# Patient Record
Sex: MALE | Race: WHITE | Employment: FULL TIME | URBAN - METROPOLITAN AREA
[De-identification: names, ages, dates, MRNs, and addresses within clinical notes are randomized per-mention and may not be internally consistent; named-entity substitution may affect disease eponyms.]

---

## 2021-05-18 ENCOUNTER — HOSPITAL ENCOUNTER (INPATIENT)
Age: 46
LOS: 16 days | Discharge: HOME | DRG: 121 | End: 2021-06-03
Attending: STUDENT IN AN ORGANIZED HEALTH CARE EDUCATION/TRAINING PROGRAM | Admitting: STUDENT IN AN ORGANIZED HEALTH CARE EDUCATION/TRAINING PROGRAM
Payer: COMMERCIAL

## 2021-05-18 ENCOUNTER — APPOINTMENT (OUTPATIENT)
Dept: CT IMAGING | Age: 46
DRG: 121 | End: 2021-05-18
Attending: STUDENT IN AN ORGANIZED HEALTH CARE EDUCATION/TRAINING PROGRAM
Payer: COMMERCIAL

## 2021-05-18 DIAGNOSIS — J85.1 ABSCESS OF UPPER LOBE OF LEFT LUNG WITH PNEUMONIA (HCC): Primary | ICD-10-CM

## 2021-05-18 PROBLEM — J85.2 LUNG ABSCESS (HCC): Status: ACTIVE | Noted: 2021-05-18

## 2021-05-18 LAB
ALBUMIN SERPL-MCNC: 1.4 G/DL (ref 3.5–5)
ALBUMIN/GLOB SERPL: 0.2 {RATIO} (ref 1.1–2.2)
ALP SERPL-CCNC: 128 U/L (ref 45–117)
ALT SERPL-CCNC: 36 U/L (ref 12–78)
AMPHET UR QL SCN: NEGATIVE
ANION GAP SERPL CALC-SCNC: 9 MMOL/L (ref 5–15)
AST SERPL-CCNC: 65 U/L (ref 15–37)
B PERT DNA SPEC QL NAA+PROBE: NOT DETECTED
BARBITURATES UR QL SCN: NEGATIVE
BASOPHILS # BLD: 0.1 K/UL (ref 0–0.1)
BASOPHILS NFR BLD: 0 % (ref 0–1)
BENZODIAZ UR QL: NEGATIVE
BILIRUB SERPL-MCNC: 0.6 MG/DL (ref 0.2–1)
BORDETELLA PARAPERTUSSIS PCR, BORPAR: NOT DETECTED
BUN SERPL-MCNC: 7 MG/DL (ref 6–20)
BUN/CREAT SERPL: 15 (ref 12–20)
C PNEUM DNA SPEC QL NAA+PROBE: NOT DETECTED
CALCIUM SERPL-MCNC: 7.9 MG/DL (ref 8.5–10.1)
CANNABINOIDS UR QL SCN: NEGATIVE
CHLORIDE SERPL-SCNC: 94 MMOL/L (ref 97–108)
CO2 SERPL-SCNC: 26 MMOL/L (ref 21–32)
COCAINE UR QL SCN: NEGATIVE
COMMENT, HOLDF: NORMAL
COVID-19 RAPID TEST, COVR: NOT DETECTED
CREAT SERPL-MCNC: 0.48 MG/DL (ref 0.7–1.3)
DIFFERENTIAL METHOD BLD: ABNORMAL
DRUG SCRN COMMENT,DRGCM: NORMAL
EOSINOPHIL # BLD: 0 K/UL (ref 0–0.4)
EOSINOPHIL NFR BLD: 0 % (ref 0–7)
ERYTHROCYTE [DISTWIDTH] IN BLOOD BY AUTOMATED COUNT: 14.4 % (ref 11.5–14.5)
FLUAV H1 2009 PAND RNA SPEC QL NAA+PROBE: NOT DETECTED
FLUAV H1 RNA SPEC QL NAA+PROBE: NOT DETECTED
FLUAV H3 RNA SPEC QL NAA+PROBE: NOT DETECTED
FLUAV SUBTYP SPEC NAA+PROBE: NOT DETECTED
FLUBV RNA SPEC QL NAA+PROBE: NOT DETECTED
GLOBULIN SER CALC-MCNC: 5.8 G/DL (ref 2–4)
GLUCOSE SERPL-MCNC: 280 MG/DL (ref 65–100)
HADV DNA SPEC QL NAA+PROBE: NOT DETECTED
HCOV 229E RNA SPEC QL NAA+PROBE: NOT DETECTED
HCOV HKU1 RNA SPEC QL NAA+PROBE: NOT DETECTED
HCOV NL63 RNA SPEC QL NAA+PROBE: NOT DETECTED
HCOV OC43 RNA SPEC QL NAA+PROBE: NOT DETECTED
HCT VFR BLD AUTO: 32.5 % (ref 36.6–50.3)
HGB BLD-MCNC: 10.2 G/DL (ref 12.1–17)
HIV 1+2 AB+HIV1 P24 AG SERPL QL IA: NONREACTIVE
HIV12 RESULT COMMENT, HHIVC: NORMAL
HMPV RNA SPEC QL NAA+PROBE: NOT DETECTED
HPIV1 RNA SPEC QL NAA+PROBE: NOT DETECTED
HPIV2 RNA SPEC QL NAA+PROBE: NOT DETECTED
HPIV3 RNA SPEC QL NAA+PROBE: NOT DETECTED
HPIV4 RNA SPEC QL NAA+PROBE: NOT DETECTED
IMM GRANULOCYTES # BLD AUTO: 0.2 K/UL (ref 0–0.04)
IMM GRANULOCYTES NFR BLD AUTO: 1 % (ref 0–0.5)
LACTATE SERPL-SCNC: 1.3 MMOL/L (ref 0.4–2)
LYMPHOCYTES # BLD: 1.4 K/UL (ref 0.8–3.5)
LYMPHOCYTES NFR BLD: 9 % (ref 12–49)
M PNEUMO DNA SPEC QL NAA+PROBE: NOT DETECTED
MCH RBC QN AUTO: 26.1 PG (ref 26–34)
MCHC RBC AUTO-ENTMCNC: 31.4 G/DL (ref 30–36.5)
MCV RBC AUTO: 83.1 FL (ref 80–99)
METHADONE UR QL: NEGATIVE
MONOCYTES # BLD: 1.6 K/UL (ref 0–1)
MONOCYTES NFR BLD: 10 % (ref 5–13)
NEUTS SEG # BLD: 12.7 K/UL (ref 1.8–8)
NEUTS SEG NFR BLD: 80 % (ref 32–75)
NRBC # BLD: 0 K/UL (ref 0–0.01)
NRBC BLD-RTO: 0 PER 100 WBC
OPIATES UR QL: NEGATIVE
OSMOLALITY UR: 562 MOSM/KG H2O
PCP UR QL: NEGATIVE
PLATELET # BLD AUTO: 579 K/UL (ref 150–400)
PMV BLD AUTO: 10.7 FL (ref 8.9–12.9)
POTASSIUM SERPL-SCNC: 3.5 MMOL/L (ref 3.5–5.1)
PROCALCITONIN SERPL-MCNC: 1.93 NG/ML
PROT SERPL-MCNC: 7.2 G/DL (ref 6.4–8.2)
RBC # BLD AUTO: 3.91 M/UL (ref 4.1–5.7)
RSV RNA SPEC QL NAA+PROBE: NOT DETECTED
RV+EV RNA SPEC QL NAA+PROBE: NOT DETECTED
SAMPLES BEING HELD,HOLD: NORMAL
SARS-COV-2 PCR, COVPCR: NOT DETECTED
SODIUM SERPL-SCNC: 129 MMOL/L (ref 136–145)
SODIUM UR-SCNC: 32 MMOL/L
SOURCE, COVRS: NORMAL
WBC # BLD AUTO: 15.9 K/UL (ref 4.1–11.1)

## 2021-05-18 PROCEDURE — 87635 SARS-COV-2 COVID-19 AMP PRB: CPT

## 2021-05-18 PROCEDURE — 87116 MYCOBACTERIA CULTURE: CPT

## 2021-05-18 PROCEDURE — P9047 ALBUMIN (HUMAN), 25%, 50ML: HCPCS | Performed by: HOSPITALIST

## 2021-05-18 PROCEDURE — 84300 ASSAY OF URINE SODIUM: CPT

## 2021-05-18 PROCEDURE — 86480 TB TEST CELL IMMUN MEASURE: CPT

## 2021-05-18 PROCEDURE — 86901 BLOOD TYPING SEROLOGIC RH(D): CPT

## 2021-05-18 PROCEDURE — 74011250637 HC RX REV CODE- 250/637: Performed by: HOSPITALIST

## 2021-05-18 PROCEDURE — 74011000258 HC RX REV CODE- 258: Performed by: STUDENT IN AN ORGANIZED HEALTH CARE EDUCATION/TRAINING PROGRAM

## 2021-05-18 PROCEDURE — 94760 N-INVAS EAR/PLS OXIMETRY 1: CPT

## 2021-05-18 PROCEDURE — 87449 NOS EACH ORGANISM AG IA: CPT

## 2021-05-18 PROCEDURE — 80307 DRUG TEST PRSMV CHEM ANLYZR: CPT

## 2021-05-18 PROCEDURE — 77010033678 HC OXYGEN DAILY

## 2021-05-18 PROCEDURE — 87147 CULTURE TYPE IMMUNOLOGIC: CPT

## 2021-05-18 PROCEDURE — 74011250637 HC RX REV CODE- 250/637: Performed by: STUDENT IN AN ORGANIZED HEALTH CARE EDUCATION/TRAINING PROGRAM

## 2021-05-18 PROCEDURE — 65270000029 HC RM PRIVATE

## 2021-05-18 PROCEDURE — 36415 COLL VENOUS BLD VENIPUNCTURE: CPT

## 2021-05-18 PROCEDURE — 0202U NFCT DS 22 TRGT SARS-COV-2: CPT

## 2021-05-18 PROCEDURE — 83605 ASSAY OF LACTIC ACID: CPT

## 2021-05-18 PROCEDURE — 87899 AGENT NOS ASSAY W/OPTIC: CPT

## 2021-05-18 PROCEDURE — 74011250636 HC RX REV CODE- 250/636: Performed by: HOSPITALIST

## 2021-05-18 PROCEDURE — 71260 CT THORAX DX C+: CPT

## 2021-05-18 PROCEDURE — 85025 COMPLETE CBC W/AUTO DIFF WBC: CPT

## 2021-05-18 PROCEDURE — 87040 BLOOD CULTURE FOR BACTERIA: CPT

## 2021-05-18 PROCEDURE — 83935 ASSAY OF URINE OSMOLALITY: CPT

## 2021-05-18 PROCEDURE — 87205 SMEAR GRAM STAIN: CPT

## 2021-05-18 PROCEDURE — 87389 HIV-1 AG W/HIV-1&-2 AB AG IA: CPT

## 2021-05-18 PROCEDURE — 84145 PROCALCITONIN (PCT): CPT

## 2021-05-18 PROCEDURE — 74011250636 HC RX REV CODE- 250/636: Performed by: NURSE PRACTITIONER

## 2021-05-18 PROCEDURE — 87086 URINE CULTURE/COLONY COUNT: CPT

## 2021-05-18 PROCEDURE — 74011000258 HC RX REV CODE- 258: Performed by: NURSE PRACTITIONER

## 2021-05-18 PROCEDURE — 74011000636 HC RX REV CODE- 636: Performed by: HOSPITALIST

## 2021-05-18 PROCEDURE — 74011250636 HC RX REV CODE- 250/636: Performed by: STUDENT IN AN ORGANIZED HEALTH CARE EDUCATION/TRAINING PROGRAM

## 2021-05-18 PROCEDURE — 65660000000 HC RM CCU STEPDOWN

## 2021-05-18 PROCEDURE — 80053 COMPREHEN METABOLIC PANEL: CPT

## 2021-05-18 RX ORDER — ATORVASTATIN CALCIUM 40 MG/1
80 TABLET, FILM COATED ORAL DAILY
Status: DISCONTINUED | OUTPATIENT
Start: 2021-05-18 | End: 2021-06-03 | Stop reason: HOSPADM

## 2021-05-18 RX ORDER — SODIUM CHLORIDE 9 MG/ML
100 INJECTION, SOLUTION INTRAVENOUS CONTINUOUS
Status: DISCONTINUED | OUTPATIENT
Start: 2021-05-18 | End: 2021-05-21

## 2021-05-18 RX ORDER — LANOLIN ALCOHOL/MO/W.PET/CERES
3 CREAM (GRAM) TOPICAL
Status: DISCONTINUED | OUTPATIENT
Start: 2021-05-18 | End: 2021-06-03 | Stop reason: HOSPADM

## 2021-05-18 RX ORDER — POLYETHYLENE GLYCOL 3350 17 G/17G
17 POWDER, FOR SOLUTION ORAL DAILY PRN
Status: DISCONTINUED | OUTPATIENT
Start: 2021-05-18 | End: 2021-06-03 | Stop reason: HOSPADM

## 2021-05-18 RX ORDER — METOPROLOL SUCCINATE 50 MG/1
50 TABLET, EXTENDED RELEASE ORAL DAILY
Status: DISCONTINUED | OUTPATIENT
Start: 2021-05-18 | End: 2021-05-23

## 2021-05-18 RX ORDER — SODIUM CHLORIDE 0.9 % (FLUSH) 0.9 %
5-40 SYRINGE (ML) INJECTION AS NEEDED
Status: DISCONTINUED | OUTPATIENT
Start: 2021-05-18 | End: 2021-05-21

## 2021-05-18 RX ORDER — PROMETHAZINE HYDROCHLORIDE 25 MG/1
12.5 TABLET ORAL
Status: DISCONTINUED | OUTPATIENT
Start: 2021-05-18 | End: 2021-06-03 | Stop reason: HOSPADM

## 2021-05-18 RX ORDER — ALBUMIN HUMAN 250 G/1000ML
12.5 SOLUTION INTRAVENOUS EVERY 6 HOURS
Status: COMPLETED | OUTPATIENT
Start: 2021-05-18 | End: 2021-05-20

## 2021-05-18 RX ORDER — SODIUM CHLORIDE 0.9 % (FLUSH) 0.9 %
5-40 SYRINGE (ML) INJECTION EVERY 8 HOURS
Status: DISCONTINUED | OUTPATIENT
Start: 2021-05-18 | End: 2021-05-21

## 2021-05-18 RX ORDER — IBUPROFEN 200 MG
1 TABLET ORAL DAILY
Status: DISCONTINUED | OUTPATIENT
Start: 2021-05-18 | End: 2021-06-03 | Stop reason: HOSPADM

## 2021-05-18 RX ORDER — ONDANSETRON 2 MG/ML
4 INJECTION INTRAMUSCULAR; INTRAVENOUS
Status: DISCONTINUED | OUTPATIENT
Start: 2021-05-18 | End: 2021-06-03 | Stop reason: HOSPADM

## 2021-05-18 RX ORDER — ACETAMINOPHEN 650 MG/1
650 SUPPOSITORY RECTAL
Status: DISCONTINUED | OUTPATIENT
Start: 2021-05-18 | End: 2021-05-21

## 2021-05-18 RX ORDER — ACETAMINOPHEN 325 MG/1
650 TABLET ORAL
Status: DISCONTINUED | OUTPATIENT
Start: 2021-05-18 | End: 2021-05-21

## 2021-05-18 RX ADMIN — ALBUMIN (HUMAN) 12.5 G: 0.25 INJECTION, SOLUTION INTRAVENOUS at 19:05

## 2021-05-18 RX ADMIN — ACETAMINOPHEN 650 MG: 325 TABLET ORAL at 21:30

## 2021-05-18 RX ADMIN — ALBUMIN (HUMAN) 12.5 G: 0.25 INJECTION, SOLUTION INTRAVENOUS at 12:45

## 2021-05-18 RX ADMIN — PIPERACILLIN AND TAZOBACTAM 3.38 G: 3; .375 INJECTION, POWDER, LYOPHILIZED, FOR SOLUTION INTRAVENOUS at 06:00

## 2021-05-18 RX ADMIN — SODIUM CHLORIDE 125 ML/HR: 900 INJECTION, SOLUTION INTRAVENOUS at 06:00

## 2021-05-18 RX ADMIN — Medication 10 ML: at 06:00

## 2021-05-18 RX ADMIN — PIPERACILLIN AND TAZOBACTAM 3.38 G: 3; .375 INJECTION, POWDER, LYOPHILIZED, FOR SOLUTION INTRAVENOUS at 15:06

## 2021-05-18 RX ADMIN — DOXYCYCLINE 100 MG: 100 INJECTION, POWDER, LYOPHILIZED, FOR SOLUTION INTRAVENOUS at 18:06

## 2021-05-18 RX ADMIN — Medication 3 MG: at 21:30

## 2021-05-18 RX ADMIN — ATORVASTATIN CALCIUM 80 MG: 40 TABLET, FILM COATED ORAL at 12:46

## 2021-05-18 RX ADMIN — PIPERACILLIN AND TAZOBACTAM 3.38 G: 3; .375 INJECTION, POWDER, LYOPHILIZED, FOR SOLUTION INTRAVENOUS at 21:22

## 2021-05-18 RX ADMIN — IOPAMIDOL 100 ML: 612 INJECTION, SOLUTION INTRAVENOUS at 16:00

## 2021-05-18 NOTE — PROGRESS NOTES
Covid rule out this AM. Continues to be droplet precautions. Now Patient is transferring off the unit. Going to 2N. Will follow up this PM as appropriate and as time allows. Thanks!

## 2021-05-18 NOTE — CONSULTS
NEPHROLOGY CONSULT NOTE     Patient: Marisol Caldwell MRN: [id number]  PCP: Rambo Mchugh MD   :     1975  Age:   39 y.o. Sex:  male      Referring physician: Mylene Posadas MD  Reason for consultation: 39 y.o. male with Lung abscess ([facility].) [S29.4] complicated by LEXIS   Admission Date: 2021  2:43 AM  LOS: 0 days      ASSESSMENT and PLAN :   Hyponatremia:  - suspect SIADH from his pulmonary process  - CT scan w/ contrast pending  - check urine osms, Na  - NPO for now  - hold on further IVF  - FR 1.5L/d once pt is eating  - daily Na levels    PNA:  - ? Cavitary PNA vs CAP  - rapid covid neg  - for CTA today  - abx per primary team    Hypotension:  - BP stable after albumin  - hold antihypertensives for now    DM2  CAD  Tobacco use     Active Problems / Assessment AAActive  : Active Problems:    Lung abscess ([facility].) (2021)         Subjective:   HPI: Marisol Caldwell is a 39 y.o.  male who has been admitted to the hospital for chills, night sweats and malaise. He has a hx of CAD s/p multiple stents, DM2, HTN, HLD. Presented to [facility] with the above complaints. He had CT of his chest which showed: Extensive pleural and parenchymal abnormalities notably in right lung with areas representing pulmonary consolidation as well as abscess formation and right empyema. He was tx to [facility] for further care. Rapid COVID neg. Labs revealed Na of 129, normal renal function. Still having productive cough. No cp, n/v/d    Past Medical Hx:   No past medical history on file. Past Surgical Hx:   No past surgical history on file. Medications:  Prior to Admission medications    Not on File       No Known Allergies    Social Hx:       No family history on file. Review of Systems:  A twelve point review of system was performed today. Pertinent positives and negatives are mentioned in the HPI. The reminder of the ROS is negative and noncontributory.      Objective:    Vitals: Vitals:    05/18/21 0308 05/18/21 0500 05/18/21 0823 05/18/21 1031   BP: 103/71 102/67 (!) 91/54 122/67   Pulse: 98 95 (!) 116 (!) 104   Resp: 20 20 18 18   Temp: 98.1 °F (36.7 °C) 98.1 °F (36.7 °C) 98.5 °F (36.9 °C) 98.5 °F (36.9 °C)   SpO2: 99% 95% 96% 94%   Weight: 81.4 kg (179 lb 6.4 oz)        I&O's:  No intake/output data recorded. Visit Vitals  /67   Pulse (!) 104   Temp 98.5 °F (36.9 °C)   Resp 18   Wt 81.4 kg (179 lb 6.4 oz)   SpO2 94%       Physical Exam:  General:Alert, No distress  HEENT: Eyes are PERRL. Conjunctiva without pallor ,erythema. The sclerae without icterus. .   Neck:Supple,no mass palpable  Lungs : Clears to auscultation Bilaterally, Normal respiratory effort  CVS: RRR, S1 S2 normal, No rub,  no LE edema  Abdomen: Soft, Non tender, No hepatosplenomegaly, bowel sounds present  Extremities: No cyanosis, No clubbing  Skin: No rash or lesions. Lymph nodes: No palpable nodes  MS: No joint swelling, erythema, warmth  Neurologic: non focal, AAO x 3  Psych: normal affect    Laboratory Results:    Lab Results   Component Value Date    BUN 7 05/18/2021     (L) 05/18/2021    K 3.5 05/18/2021    CL 94 (L) 05/18/2021    CO2 26 05/18/2021       Lab Results   Component Value Date    BUN 7 05/18/2021    K 3.5 05/18/2021       Lab Results   Component Value Date    WBC 15.9 (H) 05/18/2021    RBC 3.91 (L) 05/18/2021    HGB 10.2 (L) 05/18/2021    HCT 32.5 (L) 05/18/2021    MCV 83.1 05/18/2021    MCH 26.1 05/18/2021    RDW 14.4 05/18/2021     (H) 05/18/2021       No results found for: PTH, PHOS    Urine dipstick:   No results found for: COLOR, APPRN, SPGRU, REFSG, TANA, PROTU, GLUCU, KETU, BILU, UROU, MARY, LEUKU, GLUKE, EPSU, BACTU, WBCU, RBCU, CASTS, UCRY            Thank you for allowing us to participate in the care of this patient. We will follow patient.  Please dont hesitate to call with any questions    Aman Mullins MD  5/18/2021    [facility]   [id number] [place] Sondra Edwards [address] [place] [address]  Phone - [phone]   Fax - [phone]  [email or web address]

## 2021-05-18 NOTE — H&P
History & Physical    Primary Care Provider: Wendy, MD Francis  Source of Information: Patient and chart review    History of Presenting Illness:   Feliz Muhammad is a 39 y.o. male with past medical history of CAD status post PCI x4, tobacco use disorder, non-insulin-dependent type 2 diabetes, dyslipidemia, hypertension who [facility] with multiple complaints. Patient endorses a 1 month history of recurrent fevers, chills, night sweats, malaise. Symptoms of worsened over the last 3 days at which point he developed a cough productive of yellow sputum. He admits to smoking about 5 cigarettes daily, down from 2 packs prior to his MI. He has had no recent travel or sick contacts. Not up-to-date on Covid vaccination. On presentation to hospital, patient was diagnosed with a sepsis syndrome. He had CT of his chest which showed: Extensive pleural and parenchymal abnormalities notably in right lung with areas representing pulmonary consolidation as well as abscess formation and right empyema. Patient was treated with Rocephin, azithromycin and Flagyl. Requested therapy with transition to Zosyn and accepted patient for transfer. Review of Systems:  A comprehensive review of systems was negative except for that written in the History of Present Illness.      Past medical history: See HPI  Past surgical history: PCI x4  Prior to Admission medications    Metoprolol, Metformin, nitroglycerin, glipizide, aspirin     Family history: Heart disease, hypertension, diabetes    SOCIAL HISTORY:  Patient resides:  Independently x   Assisted Living    SNF    With family care       Smoking history:   None x   Former    Chronic      Alcohol history:   None x   Social    Chronic      Ambulates:   Independently x   w/cane    w/walker    w/wc    CODE STATUS:  DNR    Full x   Other      Objective:     Physical Exam:     Visit Vitals  /71 (BP 1 Location: Left upper arm, BP Patient Position: At rest)   Pulse 98   Temp 98.1 °F (36.7 °C)   Resp 20   Wt 81.4 kg (179 lb 6.4 oz)           General:  Alert, cooperative, no distress, appears stated age. Head:  Normocephalic, without obvious abnormality, atraumatic. Eyes:  Conjunctivae/corneas clear. PERRL, EOMs intact. Nose: Nares normal. Septum midline. Mucosa normal.        Neck: Supple, symmetrical, trachea midline, no carotid bruit and no JVD. Lungs:   Clear to auscultation bilaterally. Chest wall:  No tenderness or deformity. Heart:  Regular rate and rhythm, S1, S2 normal, no murmur, click, rub or gallop. Abdomen:   Soft, non-tender. Obese abdomen bowel sounds normal. No masses,  No organomegaly. Extremities: Extremities normal, atraumatic, no cyanosis or edema. Pulses: 2+ and symmetric all extremities. Skin: Skin color, texture, turgor normal. No rashes or lesions   Neurologic: CNII-XII intact. Data Review:     Recent Days:  No results for input(s): WBC, HGB, HCT, PLT, HGBEXT, HCTEXT, PLTEXT in the last 72 hours. No results for input(s): NA, K, CL, CO2, GLU, BUN, CREA, CA, MG, PHOS, ALB, TBIL, ALT, INR, INREXT in the last 72 hours. No lab exists for component: SGOT  No results for input(s): PH, PCO2, PO2, HCO3, FIO2 in the last 72 hours. 24 Hour Results:  No results found for this or any previous visit (from the past 24 hour(s)). Imaging:     Assessment:     Angie Enriquez is a 39 y.o. male with past medical history of CAD status post PCI x4, tobacco use disorder, non-insulin-dependent type 2 diabetes, dyslipidemia, hypertension who is admitted for community-acquired pneumonia with empyema.        Plan:       Community-acquired pneumonia with pulmonary abscesses/empyema  -Admit to monitor on telemetry and negative pressure with droplet precautions repeat CBC, -CMP, lactic acid, QuantiFERON, blood cultures, procalcitonin, hov, sputum cultures  -Continue with Zosyn  -CT images not available. -Repeat CT chest with contrast in a.m.  -Keep n.p.o.  -ID consult.   Appreciate recs    CAD status post PCI  Dyslipidemia  -Continue home metoprolol, aspirin and statin    Tobacco use disorder  -Nicotine patch    Non-insulin-dependent type 2 diabetes  -Lantus 16 units plus SSI  -Hypoglycemic protocols  -Repeat A1c              FEN/GI -  NS @ 125 ml/hr  Activity - As tolerated  DVT prophylaxis - SCDS  GI prophylaxis -  ni  Disposition - Home    CODE STATUS:  Full code       Signed By: Nolan Jacome MD     May 18, 2021

## 2021-05-18 NOTE — PROGRESS NOTES
[address] [facility]  Hospitalist Group                                                                                          Hospitalist Progress Note  Brandon Pollack MD  Answering service: [phone] from in house phone        Date of Service:  2021  NAME:  Nyoka Schwab  :  1975  MRN:  [id number]      Admission Summary:   Nyoka Schwab is a 39 y.o. male with past medical history of CAD status post PCI x4, tobacco use disorder, non-insulin-dependent type 2 diabetes, dyslipidemia, hypertension who presented to [facility] with multiple complaints. Patient endorses a 1 month history of recurrent fevers, chills, night sweats, malaise. Symptoms of worsened over the last 3 days at which point he developed a cough productive of yellow sputum. He admits to smoking about 5 cigarettes daily, down from 2 packs prior to his MI. He has had no recent travel or sick contacts. Not up-to-date on Covid vaccination. Interval history / Subjective:      Patient seen and examined at bedside patient complains of productive cough with yellowish sputum he is a scheduled for CT scan of the chest will follow up  Currently blood pressure remains low otherwise no active issues    Assessment & Plan:     Community-acquired pneumonia with ? pulmonary abscesses/empyema  -Admit to monitor on telemetry and negative pressure with droplet precautions repeat CBC, -CMP, lactic acid, QuantiFERON, blood cultures, procalcitonin, hov, sputum cultures  -Continue with Zosyn  -CT -awaiting CT chest  -Keep n.p.o.  -ID consult. Appreciate recs     CAD status post PCI  Dyslipidemia  -Continue home metoprolol, aspirin and statin     Tobacco use disorder  -Nicotine patch     Non-insulin-dependent type 2 diabetes  -Lantus 16 units plus SSI  -Hypoglycemic protocols  -Repeat A1c    Hyponatremia - ?  Siadh  FR 1500 ml    Hypotension- hold BB / will add albumin    Code status:Full  DVT prophylaxis: SCD    Care Plan discussed with: Patient/Family and Nurse  Anticipated Disposition: Home w/Family  Anticipated Discharge: 24 hours to 48 hours     Hospital Problems  Never Reviewed          Codes Class Noted POA    Lung abscess [facility]) ICD-10-CM: J85.2  ICD-9-CM: 513.0  5/18/2021 Unknown                Review of Systems:   A comprehensive review of systems was negative. Vital Signs:    Last 24hrs VS reviewed since prior progress note. Most recent are:  Visit Vitals  BP (!) 91/54 (BP 1 Location: Left upper arm, BP Patient Position: At rest)   Pulse (!) 116   Temp 98.5 °F (36.9 °C)   Resp 18   Wt 81.4 kg (179 lb 6.4 oz)   SpO2 96%       No intake or output data in the 24 hours ending 05/18/21 1017     Physical Examination:     I had a face to face encounter with this patient and independently examined them on 5/18/2021 as outlined below:          Constitutional:  No acute distress, cooperative, pleasant    ENT:  Oral mucosa moist, oropharynx benign. Resp:  CTA bilaterally. No wheezing/rhonchi/rales. No accessory muscle use   CV:  Regular rhythm, normal rate, no murmurs, gallops, rubs    GI:  Soft, non distended, non tender. normoactive bowel sounds, no hepatosplenomegaly     Musculoskeletal:  No edema, warm, 2+ pulses throughout    Neurologic:  Moves all extremities. AAOx3, CN II-XII reviewed            Data Review:    I personally reviewed  Image and labs      Labs:     Recent Labs     05/18/21  0532   WBC 15.9*   HGB 10.2*   HCT 32.5*   *     Recent Labs     05/18/21  0532   *   K 3.5   CL 94*   CO2 26   BUN 7   CREA 0.48*   *   CA 7.9*     Recent Labs     05/18/21  0532   ALT 36   *   TBILI 0.6   TP 7.2   ALB 1.4*   GLOB 5.8*     No results for input(s): INR, PTP, APTT, INREXT in the last 72 hours. No results for input(s): FE, TIBC, PSAT, FERR in the last 72 hours. No results found for: FOL, RBCF   No results for input(s): PH, PCO2, PO2 in the last 72 hours.   No results for input(s): CPK, CKNDX, TROIQ in the last 72 hours.     No lab exists for component: CPKMB  No results found for: CHOL, CHOLX, CHLST, CHOLV, HDL, HDLP, LDL, LDLC, DLDLP, TGLX, TRIGL, TRIGP, CHHD, CHHDX  No results found for: GLUCPOC  No results found for: COLOR, APPRN, SPGRU, REFSG, TANA, PROTU, GLUCU, KETU, BILU, UROU, MARY, LEUKU, GLUKE, EPSU, BACTU, WBCU, RBCU, CASTS, UCRY      Medications Reviewed:     Current Facility-Administered Medications   Medication Dose Route Frequency    piperacillin-tazobactam (ZOSYN) 3.375 g in 0.9% sodium chloride (MBP/ADV) 100 mL MBP  3.375 g IntraVENous Q8H    metoprolol succinate (TOPROL-XL) XL tablet 50 mg  50 mg Oral DAILY    atorvastatin (LIPITOR) tablet 80 mg  80 mg Oral DAILY    nicotine (NICODERM CQ) 21 mg/24 hr patch 1 Patch  1 Patch TransDERmal DAILY    sodium chloride (NS) flush 5-40 mL  5-40 mL IntraVENous Q8H    sodium chloride (NS) flush 5-40 mL  5-40 mL IntraVENous PRN    acetaminophen (TYLENOL) tablet 650 mg  650 mg Oral Q6H PRN    Or    acetaminophen (TYLENOL) suppository 650 mg  650 mg Rectal Q6H PRN    polyethylene glycol (MIRALAX) packet 17 g  17 g Oral DAILY PRN    promethazine (PHENERGAN) tablet 12.5 mg  12.5 mg Oral Q6H PRN    Or    ondansetron (ZOFRAN) injection 4 mg  4 mg IntraVENous Q6H PRN    0.9% sodium chloride infusion  125 mL/hr IntraVENous CONTINUOUS     ______________________________________________________________________  EXPECTED LENGTH OF STAY: - - -  ACTUAL LENGTH OF STAY:          0                 Tony Ibanez MD

## 2021-05-18 NOTE — ROUTINE PROCESS
Bedside shift change report given to myrna black rn (oncoming nurse) by Kareem Ambrocio (offgoing nurse). Report included the following information SBAR and Kardex.

## 2021-05-18 NOTE — PROGRESS NOTES
Pt tachycardic,  On telemetry monitoring     05/18/21 1630   Vitals   Temp 99.5 °F (37.5 °C)   Pulse (Heart Rate) (!) 120   Resp Rate 18   O2 Sat (%) 96 %   Level of Consciousness Alert (0)   /63   MAP (Calculated) 77   MEWS Score 3   Oxygen Therapy   O2 Device Nasal cannula   O2 Flow Rate (L/min) 2 l/min

## 2021-05-18 NOTE — CONSULTS
Infectious Disease Consult    Today's Date: 5/18/2021   Admit Date: 5/18/2021    Impression:   ? CAP vs right lung abscess/right empyema  leukocytosis  - COVID 19; rapid negative, PCR pending    WBC 15.9, afebrile    Blood cx (5/18) pending    procal 1.93    O2 @ 2L via n/c    HIV (-)    Type II DM  - management per primary team; continue with insulin therapy    CAD  - continue with ASA and statin      Plan:   ·  rule out TB; AFB x 3, quantiferon pending  ·  respiratory panel pending  ·  legionella, strep pneumo, and mycoplasma ordered  ·  CT of chest pending  ·  continue with IV zosyn and doxycyline  ·  adjust ABX prn  ·  fever work up if temp >= 100.4    DO NOT GIVE FLOUROQUINOLONE!! Anti-infectives:   · IV zosyn 5/18-  · IV rocephin, azithromycin, and flagyl prior to transfer    Subjective:   Date of Consultation:  May 18, 2021  Referring Physician: Dr. Bradford Valerio    Patient is a 39 y.o. male was transferred from [facility] with further evaluation for empyema. Pt started to experiencing congested/productive cough, malaise, night sweats, on and off fever episode over one month. He has not seek for medical care. He thought it will go through its course and get better. CT of chest at ER revealed extensive pleural and parenchymal abnormalities notably in right lung with areas representing pulmonary consolidation as well as abscess formation and right empyema. Reports no sick contact, no recent travel. During visit, he c/o sob, congested, and productive cough (expectorating yellow mucus). Pt's medical hx include CAD, s/p post PCI x4, tobacco use disorder, non-insulin-dependent type 2 diabetes, dyslipidemia, & hypertension. Pt did not received influenza vaccine past fall and has not received COVID 19 vaccine yet. Working on smoking cessation, no hx of drinking alcohol. No known medication allergies. ID team consulted for empyema management.   Patient Active Problem List Diagnosis Code    [facility] J85.2     No past medical history on file. No family history on file. Social History     Tobacco Use    Smoking status: Not on file   Substance Use Topics    Alcohol use: Not on file     No past surgical history on file. Prior to Admission medications    Not on File     a  No Known Allergies     REVIEW OF SYSTEMS:     Total of 12 systems reviewed as follows:   I am not able to complete the review of systems because:    The patient is intubated and sedated    The patient has altered mental status due to his acute medical problems    The patient has baseline aphasia from prior stroke(s)    The patient has baseline dementia and is not reliable historian                 POSITIVE= underlined text  Negative = text not underlined  General:  fever, chills, sweats, generalized weakness, weight loss/gain,      loss of appetite   Eyes:    blurred vision, eye pain, loss of vision, double vision  ENT:    rhinorrhea, pharyngitis   Respiratory:   cough, sputum production, SOB, wheezing, WARE, pleuritic pain   Cardiology:   chest pain, palpitations, orthopnea, PND, edema, syncope   Gastrointestinal:  abdominal pain , N/V, dysphagia, diarrhea, constipation, bleeding   Genitourinary:  frequency, urgency, dysuria, hematuria, incontinence   Muskuloskeletal :  arthralgia, myalgia   Hematology:  easy bruising, nose or gum bleeding, lymphadenopathy   Dermatological: rash, ulceration, pruritis   Endocrine:   hot flashes or polydipsia   Neurological:  headache, dizziness, confusion, focal weakness, paresthesia,     Speech difficulties, memory loss, gait disturbance  Psychological: Feelings of anxiety, depression, agitation    Objective:     Visit Vitals  /67   Pulse (!) 104   Temp 98.5 °F (36.9 °C)   Resp 18   Wt 81.4 kg (179 lb 6.4 oz)   SpO2 94%     Temp (24hrs), Av.3 °F (36.8 °C), Min:98.1 °F (36.7 °C), Max:98.5 °F (36.9 °C)       Lines:  Peripheral line    PHYSICAL EXAM:   General: Chronically ill appearing, Alert, cooperative, no distress, appears stated age. HEENT: Atraumatic, anicteric sclerae, pink conjunctivae     No oral ulcers, mucosa moist, throat clear  Neck:  Supple, symmetrical,  thyroid: non tender  Lungs:   Clear in apex, diminished breath sounds at right base, decreased breath sounds at left base No Wheezing or Rhonchi. No rales. Chest wall:  No tenderness  No Accessory muscle use. Heart:   Regular  rhythm,  No  murmur   No edema  Abdomen:   Soft, non-tender. Not distended. Bowel sounds normal  Extremities: No cyanosis. No clubbing  Skin:     Not pale. Not Jaundiced  No rashes   Psych:  Good insight. Not depressed. Not anxious or agitated. Neurologic: EOMs intact. No facial asymmetry. No aphasia or slurred speech. Symmetrical strength, Alert and oriented X 4.        Data Review:     CBC:  Recent Labs     05/18/21  0532   WBC 15.9*   GRANS 80*   MONOS 10   EOS 0   ANEU 12.7*   ABL 1.4   HGB 10.2*   HCT 32.5*   *       BMP:  Recent Labs     05/18/21  0532   CREA 0.48*   BUN 7   *   K 3.5   CL 94*   CO2 26   AGAP 9   *       LFTS:  Recent Labs     05/18/21  0532   TBILI 0.6   ALT 36   *   TP 7.2   ALB 1.4*       Microbiology:     All Micro Results     Procedure Component Value Units Date/Time    CULTURE, RESPIRATORY/SPUTUM/BRONCH Latanya Bo [[id number]] Collected: 05/18/21 0533    Order Status: Completed Specimen: Sputum Updated: 05/18/21 1337     Special Requests: NO SPECIAL REQUESTS        GRAM STAIN 3+ WBCS SEEN         3+ EPITHELIAL CELLS SEEN         2+ GRAM POSITIVE COCCI        Culture result: PENDING    RESPIRATORY VIRUS PANEL W/COVID-19, PCR [[id number]]     Order Status: Sent Specimen: NASOPHARYNGEAL SWAB     COVID-19 RAPID TEST [[id number]] Collected: 05/18/21 1035    Order Status: Completed Specimen: Nasopharyngeal Updated: 05/18/21 1110     Specimen source Nasopharyngeal        COVID-19 rapid test Not detected        Comment: Rapid [employer] ID Now       Rapid NAAT:  The specimen is NEGATIVE for SARS-CoV-2, the novel coronavirus associated with COVID-19. Negative results should be treated as presumptive and, if inconsistent with clinical signs and symptoms or necessary for patient management, should be tested with an alternative molecular assay. Negative results do not preclude SARS-CoV-2 infection and should not be used as the sole basis for patient management decisions. This test has been authorized by the FDA under an Emergency Use Authorization (EUA) for use by authorized laboratories.    Fact sheet for Healthcare Providers: ConventionUpdate.co.nz  Fact sheet for Patients: ConventionUpdate.co.nz       Methodology: Isothermal Nucleic Acid Amplification         CULTURE, BLOOD, PAIRED [[id number]] Collected: 05/18/21 0532    Order Status: Completed Specimen: Blood Updated: 05/18/21 0720    CULTURE, URINE [[id number]]     Order Status: Sent Specimen: Urine           Signed By: Reece Calderon NP     May 18, 2021

## 2021-05-18 NOTE — PROGRESS NOTES
Covid rule out this AM. Continues to be droplet precautions. Now Patient is transferring off the unit. Going to 2N. Will follow up this PM as appropriate and as time allows. Thanks!     Batsheva Spears PT, DPT  Geriatric Clinical Specialist

## 2021-05-19 LAB
ALBUMIN SERPL-MCNC: 1.9 G/DL (ref 3.5–5)
ANION GAP SERPL CALC-SCNC: 8 MMOL/L (ref 5–15)
BACTERIA SPEC CULT: NORMAL
BASOPHILS # BLD: 0.1 K/UL (ref 0–0.1)
BASOPHILS NFR BLD: 0 % (ref 0–1)
BUN SERPL-MCNC: 6 MG/DL (ref 6–20)
BUN/CREAT SERPL: 12 (ref 12–20)
CALCIUM SERPL-MCNC: 8 MG/DL (ref 8.5–10.1)
CHLORIDE SERPL-SCNC: 96 MMOL/L (ref 97–108)
CO2 SERPL-SCNC: 30 MMOL/L (ref 21–32)
COMMENT, HOLDF: NORMAL
CREAT SERPL-MCNC: 0.5 MG/DL (ref 0.7–1.3)
DIFFERENTIAL METHOD BLD: ABNORMAL
EOSINOPHIL # BLD: 0 K/UL (ref 0–0.4)
EOSINOPHIL NFR BLD: 0 % (ref 0–7)
ERYTHROCYTE [DISTWIDTH] IN BLOOD BY AUTOMATED COUNT: 14.3 % (ref 11.5–14.5)
GLUCOSE BLD STRIP.AUTO-MCNC: 291 MG/DL (ref 65–117)
GLUCOSE BLD STRIP.AUTO-MCNC: 316 MG/DL (ref 65–117)
GLUCOSE SERPL-MCNC: 264 MG/DL (ref 65–100)
HCT VFR BLD AUTO: 30.4 % (ref 36.6–50.3)
HGB BLD-MCNC: 9.4 G/DL (ref 12.1–17)
IMM GRANULOCYTES # BLD AUTO: 0.3 K/UL (ref 0–0.04)
IMM GRANULOCYTES NFR BLD AUTO: 2 % (ref 0–0.5)
LACTATE SERPL-SCNC: 1 MMOL/L (ref 0.4–2)
LYMPHOCYTES # BLD: 1.4 K/UL (ref 0.8–3.5)
LYMPHOCYTES NFR BLD: 10 % (ref 12–49)
MCH RBC QN AUTO: 25.8 PG (ref 26–34)
MCHC RBC AUTO-ENTMCNC: 30.9 G/DL (ref 30–36.5)
MCV RBC AUTO: 83.5 FL (ref 80–99)
MONOCYTES # BLD: 1.2 K/UL (ref 0–1)
MONOCYTES NFR BLD: 8 % (ref 5–13)
NEUTS SEG # BLD: 12.1 K/UL (ref 1.8–8)
NEUTS SEG NFR BLD: 80 % (ref 32–75)
NRBC # BLD: 0 K/UL (ref 0–0.01)
NRBC BLD-RTO: 0 PER 100 WBC
PHOSPHATE SERPL-MCNC: 2.2 MG/DL (ref 2.6–4.7)
PLATELET # BLD AUTO: 545 K/UL (ref 150–400)
PMV BLD AUTO: 10.3 FL (ref 8.9–12.9)
POTASSIUM SERPL-SCNC: 3.2 MMOL/L (ref 3.5–5.1)
RBC # BLD AUTO: 3.64 M/UL (ref 4.1–5.7)
SAMPLES BEING HELD,HOLD: NORMAL
SERVICE CMNT-IMP: ABNORMAL
SERVICE CMNT-IMP: ABNORMAL
SERVICE CMNT-IMP: NORMAL
SODIUM SERPL-SCNC: 134 MMOL/L (ref 136–145)
WBC # BLD AUTO: 15.1 K/UL (ref 4.1–11.1)

## 2021-05-19 PROCEDURE — 65660000000 HC RM CCU STEPDOWN

## 2021-05-19 PROCEDURE — 86738 MYCOPLASMA ANTIBODY: CPT

## 2021-05-19 PROCEDURE — 74011250636 HC RX REV CODE- 250/636: Performed by: NURSE PRACTITIONER

## 2021-05-19 PROCEDURE — 87103 BLOOD FUNGUS CULTURE: CPT

## 2021-05-19 PROCEDURE — 74011250637 HC RX REV CODE- 250/637: Performed by: STUDENT IN AN ORGANIZED HEALTH CARE EDUCATION/TRAINING PROGRAM

## 2021-05-19 PROCEDURE — 99254 IP/OBS CNSLTJ NEW/EST MOD 60: CPT | Performed by: THORACIC SURGERY (CARDIOTHORACIC VASCULAR SURGERY)

## 2021-05-19 PROCEDURE — 83605 ASSAY OF LACTIC ACID: CPT

## 2021-05-19 PROCEDURE — 74011000258 HC RX REV CODE- 258: Performed by: NURSE PRACTITIONER

## 2021-05-19 PROCEDURE — 74011000258 HC RX REV CODE- 258: Performed by: STUDENT IN AN ORGANIZED HEALTH CARE EDUCATION/TRAINING PROGRAM

## 2021-05-19 PROCEDURE — 74011250636 HC RX REV CODE- 250/636: Performed by: STUDENT IN AN ORGANIZED HEALTH CARE EDUCATION/TRAINING PROGRAM

## 2021-05-19 PROCEDURE — 74011250636 HC RX REV CODE- 250/636: Performed by: HOSPITALIST

## 2021-05-19 PROCEDURE — 36415 COLL VENOUS BLD VENIPUNCTURE: CPT

## 2021-05-19 PROCEDURE — 87305 ASPERGILLUS AG IA: CPT

## 2021-05-19 PROCEDURE — 74011000250 HC RX REV CODE- 250: Performed by: HOSPITALIST

## 2021-05-19 PROCEDURE — 65270000029 HC RM PRIVATE

## 2021-05-19 PROCEDURE — 74011636637 HC RX REV CODE- 636/637: Performed by: HOSPITALIST

## 2021-05-19 PROCEDURE — P9047 ALBUMIN (HUMAN), 25%, 50ML: HCPCS | Performed by: HOSPITALIST

## 2021-05-19 PROCEDURE — 77010033678 HC OXYGEN DAILY

## 2021-05-19 PROCEDURE — 82962 GLUCOSE BLOOD TEST: CPT

## 2021-05-19 PROCEDURE — 85025 COMPLETE CBC W/AUTO DIFF WBC: CPT

## 2021-05-19 PROCEDURE — 80069 RENAL FUNCTION PANEL: CPT

## 2021-05-19 PROCEDURE — 74011250637 HC RX REV CODE- 250/637: Performed by: HOSPITALIST

## 2021-05-19 PROCEDURE — 94760 N-INVAS EAR/PLS OXIMETRY 1: CPT

## 2021-05-19 PROCEDURE — 87116 MYCOBACTERIA CULTURE: CPT

## 2021-05-19 RX ORDER — INSULIN GLARGINE 100 [IU]/ML
15 INJECTION, SOLUTION SUBCUTANEOUS
Status: DISCONTINUED | OUTPATIENT
Start: 2021-05-19 | End: 2021-05-21

## 2021-05-19 RX ORDER — VANCOMYCIN HYDROCHLORIDE
1250 EVERY 8 HOURS
Status: DISCONTINUED | OUTPATIENT
Start: 2021-05-19 | End: 2021-05-20

## 2021-05-19 RX ORDER — ALBUMIN HUMAN 250 G/1000ML
12.5 SOLUTION INTRAVENOUS EVERY 6 HOURS
Status: COMPLETED | OUTPATIENT
Start: 2021-05-19 | End: 2021-05-20

## 2021-05-19 RX ORDER — MAGNESIUM SULFATE 100 %
4 CRYSTALS MISCELLANEOUS AS NEEDED
Status: DISCONTINUED | OUTPATIENT
Start: 2021-05-19 | End: 2021-06-03 | Stop reason: HOSPADM

## 2021-05-19 RX ORDER — INSULIN GLARGINE 100 [IU]/ML
15 INJECTION, SOLUTION SUBCUTANEOUS
Status: DISCONTINUED | OUTPATIENT
Start: 2021-05-19 | End: 2021-05-19

## 2021-05-19 RX ORDER — VANCOMYCIN 2 GRAM/500 ML IN 0.9 % SODIUM CHLORIDE INTRAVENOUS
2000 ONCE
Status: COMPLETED | OUTPATIENT
Start: 2021-05-19 | End: 2021-05-20

## 2021-05-19 RX ORDER — INSULIN LISPRO 100 [IU]/ML
INJECTION, SOLUTION INTRAVENOUS; SUBCUTANEOUS
Status: DISCONTINUED | OUTPATIENT
Start: 2021-05-19 | End: 2021-05-27

## 2021-05-19 RX ORDER — DEXTROSE 50 % IN WATER (D50W) INTRAVENOUS SYRINGE
12.5-25 AS NEEDED
Status: DISCONTINUED | OUTPATIENT
Start: 2021-05-19 | End: 2021-06-03 | Stop reason: HOSPADM

## 2021-05-19 RX ADMIN — ALBUMIN (HUMAN) 12.5 G: 0.25 INJECTION, SOLUTION INTRAVENOUS at 02:30

## 2021-05-19 RX ADMIN — PIPERACILLIN AND TAZOBACTAM 3.38 G: 3; .375 INJECTION, POWDER, LYOPHILIZED, FOR SOLUTION INTRAVENOUS at 14:26

## 2021-05-19 RX ADMIN — DOXYCYCLINE 100 MG: 100 INJECTION, POWDER, LYOPHILIZED, FOR SOLUTION INTRAVENOUS at 21:57

## 2021-05-19 RX ADMIN — POTASSIUM PHOSPHATE, MONOBASIC AND POTASSIUM PHOSPHATE, DIBASIC: 224; 236 INJECTION, SOLUTION, CONCENTRATE INTRAVENOUS at 17:36

## 2021-05-19 RX ADMIN — Medication 10 ML: at 14:22

## 2021-05-19 RX ADMIN — ALBUMIN (HUMAN) 12.5 G: 0.25 INJECTION, SOLUTION INTRAVENOUS at 07:18

## 2021-05-19 RX ADMIN — Medication 3 MG: at 22:09

## 2021-05-19 RX ADMIN — ACETAMINOPHEN 650 MG: 325 TABLET ORAL at 10:03

## 2021-05-19 RX ADMIN — ACETAMINOPHEN 650 MG: 325 TABLET ORAL at 22:09

## 2021-05-19 RX ADMIN — VANCOMYCIN HYDROCHLORIDE 2000 MG: 10 INJECTION, POWDER, LYOPHILIZED, FOR SOLUTION INTRAVENOUS at 11:00

## 2021-05-19 RX ADMIN — ALBUMIN (HUMAN) 12.5 G: 0.25 INJECTION, SOLUTION INTRAVENOUS at 17:36

## 2021-05-19 RX ADMIN — PIPERACILLIN AND TAZOBACTAM 3.38 G: 3; .375 INJECTION, POWDER, LYOPHILIZED, FOR SOLUTION INTRAVENOUS at 21:58

## 2021-05-19 RX ADMIN — ATORVASTATIN CALCIUM 80 MG: 40 TABLET, FILM COATED ORAL at 10:03

## 2021-05-19 RX ADMIN — ALBUMIN (HUMAN) 12.5 G: 0.25 INJECTION, SOLUTION INTRAVENOUS at 14:22

## 2021-05-19 RX ADMIN — Medication 10 ML: at 21:59

## 2021-05-19 RX ADMIN — Medication 10 ML: at 07:19

## 2021-05-19 RX ADMIN — PIPERACILLIN AND TAZOBACTAM 3.38 G: 3; .375 INJECTION, POWDER, LYOPHILIZED, FOR SOLUTION INTRAVENOUS at 07:19

## 2021-05-19 RX ADMIN — DOXYCYCLINE 100 MG: 100 INJECTION, POWDER, LYOPHILIZED, FOR SOLUTION INTRAVENOUS at 10:03

## 2021-05-19 RX ADMIN — VANCOMYCIN HYDROCHLORIDE 1250 MG: 10 INJECTION, POWDER, LYOPHILIZED, FOR SOLUTION INTRAVENOUS at 20:33

## 2021-05-19 RX ADMIN — INSULIN LISPRO 7 UNITS: 100 INJECTION, SOLUTION INTRAVENOUS; SUBCUTANEOUS at 17:36

## 2021-05-19 NOTE — PROGRESS NOTES
[facility]   [address] [facility], [address] [place] [address], [facility]  Phone: [phone]   [phone]       Nephrology Progress Note  Erica Pope     1975     [id number]  Date of Admission : 5/18/2021 05/19/21    CC: Follow up for Hyponatremia     Assessment and Plan   Hyponatremia:  - suspect SIADH from his pulmonary process  - CT scan showing multilobar PNA  - check chems suggesting SIADH picture   - FR 1.5L/d for now   - daily Na levels    Hypo K :  - replete PRN      Multilobar PNA:  - per primary team      Hypotension:  - improving      DM2  CAD  Tobacco use     Interval History:  Febrile   BP better  Na better  Not seen in room due to isolation     Review of Systems: Review of systems not obtained due to patient factors.     Current Medications:   Current Facility-Administered Medications   Medication Dose Route Frequency    Vancomycin - Pharmacy to Dose   Other Rx Dosing/Monitoring    vancomycin (VANCOCIN) 2000 mg in  ml infusion  2,000 mg IntraVENous ONCE    Followed by   Scheryl Gemma vancomycin (VANCOCIN) 1250 mg in  ml infusion  1,250 mg IntraVENous Q8H    piperacillin-tazobactam (ZOSYN) 3.375 g in 0.9% sodium chloride (MBP/ADV) 100 mL MBP  3.375 g IntraVENous Q8H    [Held by provider] metoprolol succinate (TOPROL-XL) XL tablet 50 mg  50 mg Oral DAILY    atorvastatin (LIPITOR) tablet 80 mg  80 mg Oral DAILY    nicotine (NICODERM CQ) 21 mg/24 hr patch 1 Patch  1 Patch TransDERmal DAILY    sodium chloride (NS) flush 5-40 mL  5-40 mL IntraVENous Q8H    sodium chloride (NS) flush 5-40 mL  5-40 mL IntraVENous PRN    acetaminophen (TYLENOL) tablet 650 mg  650 mg Oral Q6H PRN    Or    acetaminophen (TYLENOL) suppository 650 mg  650 mg Rectal Q6H PRN    polyethylene glycol (MIRALAX) packet 17 g  17 g Oral DAILY PRN    promethazine (PHENERGAN) tablet 12.5 mg  12.5 mg Oral Q6H PRN    Or    ondansetron (ZOFRAN) injection 4 mg  4 mg IntraVENous Q6H PRN    [Held by provider] 0.9% sodium chloride infusion  125 mL/hr IntraVENous CONTINUOUS    doxycycline (VIBRAMYCIN) 100 mg in 0.9% sodium chloride (MBP/ADV) 100 mL MBP  100 mg IntraVENous Q12H    melatonin tablet 3 mg  3 mg Oral QHS PRN      No Known Allergies    Objective:  Vitals:    Vitals:    05/19/21 0000 05/19/21 0400 05/19/21 0908 05/19/21 1040   BP: 97/64 (!) 93/53 120/63    Pulse: 100 (!) 111 (!) 123    Resp: 18 18 18    Temp: 98.3 °F (36.8 °C) 98.7 °F (37.1 °C) (!) 101.8 °F (38.8 °C)    SpO2: 95% 95% 92%    Weight: 80.5 kg (177 lb 8 oz)      Height:    5' 6\" (1.676 m)     Intake and Output:  No intake/output data recorded. 05/17 1901 - 05/19 0700  In: 300 [P.O.:300]  Out: 525 [Urine:525]    Physical Examination:  Not examined in room     []    High complexity decision making was performed  []    Patient is at high-risk of decompensation with multiple organ involvement    Lab Data Personally Reviewed: I have reviewed all the pertinent labs, microbiology data and radiology studies during assessment. Recent Labs     05/19/21  0523 05/18/21  0532   * 129*   K 3.2* 3.5   CL 96* 94*   CO2 30 26   * 280*   BUN 6 7   CREA 0.50* 0.48*   CA 8.0* 7.9*   PHOS 2.2*  --    ALB 1.9* 1.4*   ALT  --  36     Recent Labs     05/19/21  1101 05/18/21  0532   WBC 15.1* 15.9*   HGB 9.4* 10.2*   HCT 30.4* 32.5*   * 579*     No results found for: SDES  Lab Results   Component Value Date/Time    Culture result: LIGHT NORMAL RESPIRATORY RAMIRO . ..  SO FAR 05/18/2021 05:33 AM    Culture result: NO GROWTH AFTER 22 HOURS 05/18/2021 05:32 AM     Recent Results (from the past 24 hour(s))   RESPIRATORY VIRUS PANEL W/COVID-19, PCR    Collection Time: 05/18/21  3:10 PM    Specimen: Nasopharyngeal   Result Value Ref Range    Adenovirus Not detected NOTD      Coronavirus 229E Not detected NOTD      Coronavirus HKU1 Not detected NOTD      Coronavirus CVNL63 Not detected NOTD      Coronavirus OC43 Not detected NOTD      Metapneumovirus Not detected NOTD      Rhinovirus and Enterovirus Not detected NOTD      Influenza A Not detected NOTD      Influenza A, subtype H1 Not detected NOTD      Influenza A, subtype H3 Not detected NOTD      INFLUENZA A H1N1 PCR Not detected NOTD      Influenza B Not detected NOTD      Parainfluenza 1 Not detected NOTD      Parainfluenza 2 Not detected NOTD      Parainfluenza 3 Not detected NOTD      Parainfluenza virus 4 Not detected NOTD      RSV by PCR Not detected NOTD      B. parapertussis, PCR Not detected NOTD      Bordetella pertussis - PCR Not detected NOTD      Chlamydophila pneumoniae DNA, QL, PCR Not detected NOTD      Mycoplasma pneumoniae DNA, QL, PCR Not detected NOTD      SARS-CoV-2, PCR Not detected NOTD     OSMOLALITY, UR    Collection Time: 05/18/21  3:30 PM   Result Value Ref Range    Osmolality,urine 562 MOSM/kg H2O   SODIUM, UR, RANDOM    Collection Time: 05/18/21  3:30 PM   Result Value Ref Range    Sodium,urine random 32 MMOL/L   DRUG SCREEN, URINE    Collection Time: 05/18/21  4:03 PM   Result Value Ref Range    AMPHETAMINES Negative NEG      BARBITURATES Negative NEG      BENZODIAZEPINES Negative NEG      COCAINE Negative NEG      METHADONE Negative NEG      OPIATES Negative NEG      PCP(PHENCYCLIDINE) Negative NEG      THC (TH-CANNABINOL) Negative NEG      Drug screen comment (NOTE)    RENAL FUNCTION PANEL    Collection Time: 05/19/21  5:23 AM   Result Value Ref Range    Sodium 134 (L) 136 - 145 mmol/L    Potassium 3.2 (L) 3.5 - 5.1 mmol/L    Chloride 96 (L) 97 - 108 mmol/L    CO2 30 21 - 32 mmol/L    Anion gap 8 5 - 15 mmol/L    Glucose 264 (H) 65 - 100 mg/dL    BUN 6 6 - 20 MG/DL    Creatinine 0.50 (L) 0.70 - 1.30 MG/DL    BUN/Creatinine ratio 12 12 - 20      GFR est AA >60 >60 ml/min/1.73m2    GFR est non-AA >60 >60 ml/min/1.73m2    Calcium 8.0 (L) 8.5 - 10.1 MG/DL    Phosphorus 2.2 (L) 2.6 - 4.7 MG/DL    Albumin 1.9 (L) 3.5 - 5.0 g/dL   SAMPLES BEING HELD    Collection Time: 05/19/21  5:28 AM Result Value Ref Range    SAMPLES BEING HELD  1 SST     COMMENT        Add-on orders for these samples will be processed based on acceptable specimen integrity and analyte stability, which may vary by analyte. CBC WITH AUTOMATED DIFF    Collection Time: 05/19/21 11:01 AM   Result Value Ref Range    WBC 15.1 (H) 4.1 - 11.1 K/uL    RBC 3.64 (L) 4.10 - 5.70 M/uL    HGB 9.4 (L) 12.1 - 17.0 g/dL    HCT 30.4 (L) 36.6 - 50.3 %    MCV 83.5 80.0 - 99.0 FL    MCH 25.8 (L) 26.0 - 34.0 PG    MCHC 30.9 30.0 - 36.5 g/dL    RDW 14.3 11.5 - 14.5 %    PLATELET 805 (H) 610 - 400 K/uL    MPV 10.3 8.9 - 12.9 FL    NRBC 0.0 0  WBC    ABSOLUTE NRBC 0.00 0.00 - 0.01 K/uL    NEUTROPHILS 80 (H) 32 - 75 %    LYMPHOCYTES 10 (L) 12 - 49 %    MONOCYTES 8 5 - 13 %    EOSINOPHILS 0 0 - 7 %    BASOPHILS 0 0 - 1 %    IMMATURE GRANULOCYTES 2 (H) 0.0 - 0.5 %    ABS. NEUTROPHILS 12.1 (H) 1.8 - 8.0 K/UL    ABS. LYMPHOCYTES 1.4 0.8 - 3.5 K/UL    ABS. MONOCYTES 1.2 (H) 0.0 - 1.0 K/UL    ABS. EOSINOPHILS 0.0 0.0 - 0.4 K/UL    ABS. BASOPHILS 0.1 0.0 - 0.1 K/UL    ABS. IMM. GRANS. 0.3 (H) 0.00 - 0.04 K/UL    DF AUTOMATED     LACTIC ACID    Collection Time: 05/19/21 11:19 AM   Result Value Ref Range    Lactic acid 1.0 0.4 - 2.0 MMOL/L           Total time spent with patient:  xxx   min. Care Plan discussed with:  Patient     Family      RN      Consulting Physician [facility],         I have reviewed the flowsheets. Chart and Pertinent Notes have been reviewed. No change in PMH ,family and social history from Consult note.       Cate Beth MD

## 2021-05-19 NOTE — PROGRESS NOTES
ID Progress Note  2021    Subjective:   C/o SOB/WARE  Review of Systems:            Symptom Y/N Comments   Symptom Y/N Comments   Fever/Chills  n     Chest Pain n       Poor Appetite       Edema        Cough  y     Abdominal Pain        Sputum  y     Joint Pain        SOB/WARE  y     Pruritis/Rash        Nausea/vomit  n     Tolerating PT/OT        Diarrhea  n     Tolerating Diet        Constipation  n     Other           Could NOT obtain due to:       Objective:     Vitals:   Visit Vitals  BP (!) 93/53 (BP 1 Location: Left upper arm, BP Patient Position: At rest)   Pulse (!) 111   Temp 98.7 °F (37.1 °C)   Resp 18   Wt 80.5 kg (177 lb 8 oz)   SpO2 95%        Tmax:  Temp (24hrs), Av.9 °F (37.2 °C), Min:98.3 °F (36.8 °C), Max:100 °F (37.8 °C)      PHYSICAL EXAM:  General: Chronically ill appearing, WD, WN. Alert, cooperative, no acute distress    EENT:  EOMI. Anicteric sclerae. MMM  Resp:  Coarse breath sounds in apex with diminished breath sounds at bases, no wheezing or rales. No accessory muscle use  CV:  Regular  rhythm,  No edema  GI:  Soft, Non distended, Non tender. +Bowel sounds  Neurologic:  Alert and oriented X 3, normal speech,   Psych:   Good insight. Not anxious nor agitated  Skin:  No rashes.   No jaundice    Labs:   Lab Results   Component Value Date/Time    WBC 15.9 (H) 2021 05:32 AM    HGB 10.2 (L) 2021 05:32 AM    HCT 32.5 (L) 2021 05:32 AM    PLATELET 427 (H)  05:32 AM    MCV 83.1 2021 05:32 AM     Lab Results   Component Value Date/Time    Sodium 134 (L) 2021 05:23 AM    Potassium 3.2 (L) 2021 05:23 AM    Chloride 96 (L) 2021 05:23 AM    CO2 30 2021 05:23 AM    Anion gap 8 2021 05:23 AM    Glucose 264 (H) 2021 05:23 AM    BUN 6 2021 05:23 AM    Creatinine 0.50 (L) 2021 05:23 AM    BUN/Creatinine ratio 12 2021 05:23 AM    GFR est AA >60 2021 05:23 AM    GFR est non-AA >60 2021 05:23 AM Calcium 8.0 (L) 05/19/2021 05:23 AM    Bilirubin, total 0.6 05/18/2021 05:32 AM    Alk. phosphatase 128 (H) 05/18/2021 05:32 AM    Protein, total 7.2 05/18/2021 05:32 AM    Albumin 1.9 (L) 05/19/2021 05:23 AM    Globulin 5.8 (H) 05/18/2021 05:32 AM    A-G Ratio 0.2 (L) 05/18/2021 05:32 AM    ALT (SGPT) 36 05/18/2021 05:32 AM         Assessment and Plan   CAP   right lung abscess/right empyema  leukocytosis  - COVID 19; rapid & PCR negative    WBC 15.1 T-max 101.8    Blood cx (5/18) no growth so far    Fungal blood cx (5/19) pending    Urin x (5/18) no growth    procal 1.93    O2 @ 2L via n/c    HIV (-)   rule out TB; AFB x 3, quantiferon pending   respiratory panel (-)   legionella, strep pneumo, and mycoplasma pending   CT of chest (5/18):  1. Multiloculated right empyema. 2. Right lower lobe pneumonia. 3. Right upper lobe pneumonia with possible small lung abscess. 4. Emphysema. 5. Coronary artery disease. To be seen by CTS team in hope to drain abscess     continue with IV zosyn, doxycyline, and vancomycin   adjust ABX prn   fever work up if temp >= 100.4     DO NOT GIVE FLOUROQUINOLONE!!   -> Since flouroquinolone could potentially provide falsely negative AFBs. Wait for AFB results first before starting flouroquinolone.      Type II DM  - management per primary team; continue with insulin therapy     CAD  - continue with ASA and statin      Belen Agee NP

## 2021-05-19 NOTE — PROGRESS NOTES
Occupational Therapy    Orders received, chart review, patient now with fever and increased HR at rest, will hold for MD to see, and follow up later as able. Bia Ghotra.  MS Tory OTR/L

## 2021-05-19 NOTE — PROGRESS NOTES
05/19/21 0908   Vital Signs   Temp (!) 101.8 °F (38.8 °C)   Temp Source Oral   Pulse (Heart Rate) (!) 123   Heart Rate Source Monitor   Resp Rate 18   O2 Sat (%) 92 %   Level of Consciousness Alert (0)   /63   MAP (Calculated) 82   BP 1 Method Automatic   BP 1 Location Left upper arm   BP Patient Position At rest   MEWS Score 5   Oxygen Therapy   O2 Device Nasal cannula   O2 Flow Rate (L/min) 2 l/min   spoke with ID, made aware, coming to assess the patient. Will continue to monitor.

## 2021-05-19 NOTE — PROGRESS NOTES
[facility]  Hospitalist Group                                                                                          Hospitalist Progress Note  Carlos Rodrigues MD  Answering service: [phone] from in house phone        Date of Service:  2021  NAME:  Richardson Ansari  :  1975  MRN:  [id number]      Admission Summary:   Richardson Ansari is a 39 y.o. male with past medical history of CAD status post PCI x4, tobacco use disorder, non-insulin-dependent type 2 diabetes, dyslipidemia, hypertension who presented to [facility] with multiple complaints. Patient endorses a 1 month history of recurrent fevers, chills, night sweats, malaise. Symptoms of worsened over the last 3 days at which point he developed a cough productive of yellow sputum. He admits to smoking about 5 cigarettes daily, down from 2 packs prior to his MI. He has had no recent travel or sick contacts. Not up-to-date on Covid vaccination. Interval history / Subjective:      Patient seen and examined   CT reviewed with patient showing Multiloculated right empyema. Thoracic surgery consulted continue IV antibiotics ID on board    Assessment & Plan:     Community-acquired pneumonia with ? pulmonary abscesses/empyema  - CT - Multiloculated right empyema.  consult thoracic surgery8   -Covid test PCR pending   CBC, -CMP, lactic acid, QuantiFERON, blood cultures, procalcitonin, hov, sputum cultures  -Continue with Vanc Zosyn doxycycline  -ID consult. Appreciate recs     CAD status post PCI  Dyslipidemia  -Continue home metoprolol, aspirin and statin     Tobacco use disorder  -Nicotine patch     Non-insulin-dependent type 2 diabetes  -Lantus 16 units plus SSI new adjusting  -Hypoglycemic protocols  -Repeat A1c    Hyponatremia - ?  Siadh  FR 1500 ml    Hypotension- hold BB / will add albumin blood pressure low continue albumin    Hypokalemia hypophosphatemia replete IV    Code status:Full  DVT prophylaxis: SCD    Care Plan discussed with: Patient/Family and Nurse  Anticipated Disposition: Home w/Family  Anticipated Discharge: 24 hours to 48 hours     Hospital Problems  Never Reviewed        Codes Class Noted POA    Lung abscess [facility]) ICD-10-CM: J85.2  ICD-9-CM: 513.0  5/18/2021 Unknown                Review of Systems:   A comprehensive review of systems was negative. Vital Signs:    Last 24hrs VS reviewed since prior progress note. Most recent are:  Visit Vitals  BP (!) 100/57 (BP 1 Location: Left upper arm, BP Patient Position: At rest)   Pulse (!) 112   Temp 98.7 °F (37.1 °C)   Resp 18   Ht 5' 6\" (1.676 m)   Wt 80.5 kg (177 lb 8 oz)   SpO2 93%   BMI 28.65 kg/m²         Intake/Output Summary (Last 24 hours) at 5/19/2021 1249  Last data filed at 5/18/2021 1905  Gross per 24 hour   Intake 300 ml   Output 525 ml   Net -225 ml        Physical Examination:     I had a face to face encounter with this patient and independently examined them on 5/19/2021 as outlined below:          Constitutional:  No acute distress, cooperative, pleasant    ENT:  Oral mucosa moist, oropharynx benign. Resp:  CTA bilaterally. No wheezing/rhonchi/rales. No accessory muscle use   CV:  Regular rhythm, normal rate, no murmurs, gallops, rubs    GI:  Soft, non distended, non tender. normoactive bowel sounds, no hepatosplenomegaly     Musculoskeletal:  No edema, warm, 2+ pulses throughout    Neurologic:  Moves all extremities. AAOx3, CN II-XII reviewed            Data Review:    I personally reviewed  Image and labs    CT CHEST W CONT    Result Date: 5/18/2021  1. Multiloculated right empyema. 2. Right lower lobe pneumonia. 3. Right upper lobe pneumonia with possible small lung abscess. 4. Emphysema. 5. Coronary artery disease.      Labs:     Recent Labs     05/19/21  1101 05/18/21  0532   WBC 15.1* 15.9*   HGB 9.4* 10.2*   HCT 30.4* 32.5*   * 579*     Recent Labs     05/19/21  0523 05/18/21  0532   NA 134* 129*   K 3.2* 3.5   CL 96* 94*   CO2 30 26   BUN 6 7   CREA 0.50* 0.48*   * 280*   CA 8.0* 7.9*   PHOS 2.2*  --      Recent Labs     05/19/21  0523 05/18/21  0532   ALT  --  36   AP  --  128*   TBILI  --  0.6   TP  --  7.2   ALB 1.9* 1.4*   GLOB  --  5.8*     No results for input(s): INR, PTP, APTT, INREXT, INREXT in the last 72 hours. No results for input(s): FE, TIBC, PSAT, FERR in the last 72 hours. No results found for: FOL, RBCF   No results for input(s): PH, PCO2, PO2 in the last 72 hours. No results for input(s): CPK, CKNDX, TROIQ in the last 72 hours.     No lab exists for component: CPKMB  No results found for: CHOL, CHOLX, CHLST, CHOLV, HDL, HDLP, LDL, LDLC, DLDLP, TGLX, TRIGL, TRIGP, CHHD, CHHDX  No results found for: GLUCPOC  No results found for: COLOR, APPRN, SPGRU, REFSG, TANA, PROTU, GLUCU, KETU, BILU, UROU, MARY, LEUKU, GLUKE, EPSU, BACTU, WBCU, RBCU, CASTS, UCRY      Medications Reviewed:     Current Facility-Administered Medications   Medication Dose Route Frequency    Vancomycin - Pharmacy to Dose   Other Rx Dosing/Monitoring    vancomycin (VANCOCIN) 2000 mg in  ml infusion  2,000 mg IntraVENous ONCE    Followed by   [facility] vancomycin (VANCOCIN) 1250 mg in  ml infusion  1,250 mg IntraVENous Q8H    piperacillin-tazobactam (ZOSYN) 3.375 g in 0.9% sodium chloride (MBP/ADV) 100 mL MBP  3.375 g IntraVENous Q8H    [Held by provider] metoprolol succinate (TOPROL-XL) XL tablet 50 mg  50 mg Oral DAILY    atorvastatin (LIPITOR) tablet 80 mg  80 mg Oral DAILY    nicotine (NICODERM CQ) 21 mg/24 hr patch 1 Patch  1 Patch TransDERmal DAILY    sodium chloride (NS) flush 5-40 mL  5-40 mL IntraVENous Q8H    sodium chloride (NS) flush 5-40 mL  5-40 mL IntraVENous PRN    acetaminophen (TYLENOL) tablet 650 mg  650 mg Oral Q6H PRN    Or    acetaminophen (TYLENOL) suppository 650 mg  650 mg Rectal Q6H PRN    polyethylene glycol (MIRALAX) packet 17 g  17 g Oral DAILY PRN    promethazine (PHENERGAN) tablet 12.5 mg  12.5 mg Oral Q6H PRN    Or    ondansetron (ZOFRAN) injection 4 mg  4 mg IntraVENous Q6H PRN    [Held by provider] 0.9% sodium chloride infusion  125 mL/hr IntraVENous CONTINUOUS    doxycycline (VIBRAMYCIN) 100 mg in 0.9% sodium chloride (MBP/ADV) 100 mL MBP  100 mg IntraVENous Q12H    melatonin tablet 3 mg  3 mg Oral QHS PRN     ______________________________________________________________________  EXPECTED LENGTH OF STAY: - - -  ACTUAL LENGTH OF STAY:          1                 Avani Brar MD

## 2021-05-19 NOTE — PROGRESS NOTES
Pt tachycardic, on telemetry     05/18/21 2125   Vitals   Temp 100 °F (37.8 °C)   Pulse (Heart Rate) (!) 116   Resp Rate 18   O2 Sat (%) 92 %   Level of Consciousness Alert (0)   BP (!) 95/53   MAP (Calculated) 67   MEWS Score 4   Oxygen Therapy   O2 Device Nasal cannula   O2 Flow Rate (L/min) 2 l/min

## 2021-05-19 NOTE — PROGRESS NOTES
Pharmacist Note - Vancomycin Dosing    Consult provided for this 39 y.o. male for indication of Lung abscess. Antibiotic regimen(s): Vancomycin, doxycycline, and Zosyn  Patient on vancomycin PTA? NO     Recent Labs     21  0523 21  0532   WBC  --  15.9*   CREA 0.50* 0.48*   BUN 6 7     Frequency of BMP: Daily x 3   Height: 167.6 cm  Weight: 80.5 kg  Est CrCl: 132.9 ml/min; UO: 0.5 ml/kg/hr  Temp (24hrs), Av.7 °F (37.6 °C), Min:98.3 °F (36.8 °C), Max:101.8 °F (38.8 °C)    Cultures:   RSV - not detected - Final   Resp viral panel - negative - Final   Legionella - negative - Final   S.pneumo AG, UR/CSF - negative - pending   Mycoplasma - pending   AFB - NGTD - pending   Quantiferon-TB - pending   Sputum - 2+ GPC, Light normal sumi - pending    Goal trough = 15 - 20 mcg/mL    Therapy will be initiated with a loading dose of 2000 mg IV x 1 to be followed by a maintenance dose of 1250 mg IV every 8 hours. Pharmacy to follow patient daily and order levels / make dose adjustments as appropriate.     Valery Correa, PharmD  Clinical Pharmacist, Orthopedics and Med/Surg  [id number] [employer] ()

## 2021-05-19 NOTE — CONSULTS
Asked to see Mr. Maddie De Guzman re: empyema    Referred by Dr. Kathleen Hillman    Mr Maddie De Guzman is a pleasant 40 y/o gentleman with a past medical  History significant for DMII, CAD and HTN. He was transferred here from an [facility] for an empyema. He has a 1 month history of fevers/chills/malaise. In the past 4 days his cough has become productive. No Known Allergies    PMHx: CAD [pci], HTN, DMII    PSHx: none noted    SocHx: active msoker    FamHx: htn, heart disease      ROS:    Constitutional- general malaise  HEENT- denies dysphagia  Neuro- denies syncope  Optho- no recent changes in vision  Resp- productive cough  CV- denies angina or palpitations  GI- denies abd pain  - no complaints  ID- fevers  Vasc- denies claudication    Blood pressure (!) 100/57, pulse (!) 112, temperature 98.7 °F (37.1 °C), resp. rate 18, height 5' 6\" (1.676 m), weight 177 lb 8 oz (80.5 kg), SpO2 93 %.     On exam he is laying in bed  Alert and oriented  Well developed  mildly diaphoretic  Not tachypneic  Normal speech, normal affect  No goiter  Lungs diminished breath sounds right side  Rrr, no murmurs  Radial pulses palp b/l  abd sntnd, no organomegaly  LE non edematous       ==============================    Recent Results (from the past 24 hour(s))   RENAL FUNCTION PANEL    Collection Time: 05/19/21  5:23 AM   Result Value Ref Range    Sodium 134 (L) 136 - 145 mmol/L    Potassium 3.2 (L) 3.5 - 5.1 mmol/L    Chloride 96 (L) 97 - 108 mmol/L    CO2 30 21 - 32 mmol/L    Anion gap 8 5 - 15 mmol/L    Glucose 264 (H) 65 - 100 mg/dL    BUN 6 6 - 20 MG/DL    Creatinine 0.50 (L) 0.70 - 1.30 MG/DL    BUN/Creatinine ratio 12 12 - 20      GFR est AA >60 >60 ml/min/1.73m2    GFR est non-AA >60 >60 ml/min/1.73m2    Calcium 8.0 (L) 8.5 - 10.1 MG/DL    Phosphorus 2.2 (L) 2.6 - 4.7 MG/DL    Albumin 1.9 (L) 3.5 - 5.0 g/dL   SAMPLES BEING HELD    Collection Time: 05/19/21  5:28 AM   Result Value Ref Range    SAMPLES BEING HELD  1 SST COMMENT        Add-on orders for these samples will be processed based on acceptable specimen integrity and analyte stability, which may vary by analyte. CBC WITH AUTOMATED DIFF    Collection Time: 05/19/21 11:01 AM   Result Value Ref Range    WBC 15.1 (H) 4.1 - 11.1 K/uL    RBC 3.64 (L) 4.10 - 5.70 M/uL    HGB 9.4 (L) 12.1 - 17.0 g/dL    HCT 30.4 (L) 36.6 - 50.3 %    MCV 83.5 80.0 - 99.0 FL    MCH 25.8 (L) 26.0 - 34.0 PG    MCHC 30.9 30.0 - 36.5 g/dL    RDW 14.3 11.5 - 14.5 %    PLATELET 657 (H) 810 - 400 K/uL    MPV 10.3 8.9 - 12.9 FL    NRBC 0.0 0  WBC    ABSOLUTE NRBC 0.00 0.00 - 0.01 K/uL    NEUTROPHILS 80 (H) 32 - 75 %    LYMPHOCYTES 10 (L) 12 - 49 %    MONOCYTES 8 5 - 13 %    EOSINOPHILS 0 0 - 7 %    BASOPHILS 0 0 - 1 %    IMMATURE GRANULOCYTES 2 (H) 0.0 - 0.5 %    ABS. NEUTROPHILS 12.1 (H) 1.8 - 8.0 K/UL    ABS. LYMPHOCYTES 1.4 0.8 - 3.5 K/UL    ABS. MONOCYTES 1.2 (H) 0.0 - 1.0 K/UL    ABS. EOSINOPHILS 0.0 0.0 - 0.4 K/UL    ABS. BASOPHILS 0.1 0.0 - 0.1 K/UL    ABS. IMM. GRANS. 0.3 (H) 0.00 - 0.04 K/UL    DF AUTOMATED     LACTIC ACID    Collection Time: 05/19/21 11:19 AM   Result Value Ref Range    Lactic acid 1.0 0.4 - 2.0 MMOL/L   GLUCOSE, POC    Collection Time: 05/19/21  5:26 PM   Result Value Ref Range    Glucose (POC) 316 (H) 65 - 117 mg/dL    Performed by Melva Quarles        ==============================    I have personally reviewed his chest CT. Loculated complex right empyema, possible RLL lung abscess    ==============================    PFTs- none    ==============================    Diagnoses  1: Empyema    =============================    Mr. Yris Alvarado has a complex Right empyema. He is being worked up and treated by ID. TB rule out. He will benefit from a decortication. Will post for a R VATS Friday.     Thank you for allowing us to care for Mr. Maddie De Guzman

## 2021-05-19 NOTE — PROGRESS NOTES
Chart reviewed, pt now with fever and tachycardia - per nursing appropriate to defer PT eval for now - will continue to follow -     Sav Dodge, PT

## 2021-05-20 LAB
ABO + RH BLD: NORMAL
ALBUMIN SERPL-MCNC: 2 G/DL (ref 3.5–5)
ANION GAP SERPL CALC-SCNC: 11 MMOL/L (ref 5–15)
BACTERIA SPEC CULT: ABNORMAL
BACTERIA SPEC CULT: ABNORMAL
BLOOD GROUP ANTIBODIES SERPL: NORMAL
BUN SERPL-MCNC: 4 MG/DL (ref 6–20)
BUN/CREAT SERPL: 9 (ref 12–20)
CALCIUM SERPL-MCNC: 8.5 MG/DL (ref 8.5–10.1)
CHLORIDE SERPL-SCNC: 98 MMOL/L (ref 97–108)
CO2 SERPL-SCNC: 26 MMOL/L (ref 21–32)
CREAT SERPL-MCNC: 0.46 MG/DL (ref 0.7–1.3)
DATE LAST DOSE: ABNORMAL
EST. AVERAGE GLUCOSE BLD GHB EST-MCNC: 332 MG/DL
GLUCOSE BLD STRIP.AUTO-MCNC: 256 MG/DL (ref 65–117)
GLUCOSE BLD STRIP.AUTO-MCNC: 298 MG/DL (ref 65–117)
GLUCOSE BLD STRIP.AUTO-MCNC: 301 MG/DL (ref 65–117)
GLUCOSE BLD STRIP.AUTO-MCNC: 334 MG/DL (ref 65–117)
GLUCOSE SERPL-MCNC: 272 MG/DL (ref 65–100)
GRAM STN SPEC: ABNORMAL
HBA1C MFR BLD: 13.2 % (ref 4–5.6)
PHOSPHATE SERPL-MCNC: 2.9 MG/DL (ref 2.6–4.7)
POTASSIUM SERPL-SCNC: 3.4 MMOL/L (ref 3.5–5.1)
REPORTED DOSE,DOSE: ABNORMAL UNITS
REPORTED DOSE/TIME,TMG: ABNORMAL
SERVICE CMNT-IMP: ABNORMAL
SODIUM SERPL-SCNC: 135 MMOL/L (ref 136–145)
SPECIMEN EXP DATE BLD: NORMAL
VANCOMYCIN TROUGH SERPL-MCNC: 12.2 UG/ML (ref 5–10)

## 2021-05-20 PROCEDURE — 36415 COLL VENOUS BLD VENIPUNCTURE: CPT

## 2021-05-20 PROCEDURE — 82962 GLUCOSE BLOOD TEST: CPT

## 2021-05-20 PROCEDURE — 74011000258 HC RX REV CODE- 258: Performed by: STUDENT IN AN ORGANIZED HEALTH CARE EDUCATION/TRAINING PROGRAM

## 2021-05-20 PROCEDURE — 74011250636 HC RX REV CODE- 250/636: Performed by: INTERNAL MEDICINE

## 2021-05-20 PROCEDURE — 74011250637 HC RX REV CODE- 250/637: Performed by: HOSPITALIST

## 2021-05-20 PROCEDURE — 74011250636 HC RX REV CODE- 250/636: Performed by: HOSPITALIST

## 2021-05-20 PROCEDURE — 80202 ASSAY OF VANCOMYCIN: CPT

## 2021-05-20 PROCEDURE — 74011250637 HC RX REV CODE- 250/637: Performed by: STUDENT IN AN ORGANIZED HEALTH CARE EDUCATION/TRAINING PROGRAM

## 2021-05-20 PROCEDURE — 87116 MYCOBACTERIA CULTURE: CPT

## 2021-05-20 PROCEDURE — 94760 N-INVAS EAR/PLS OXIMETRY 1: CPT

## 2021-05-20 PROCEDURE — 65270000029 HC RM PRIVATE

## 2021-05-20 PROCEDURE — P9047 ALBUMIN (HUMAN), 25%, 50ML: HCPCS | Performed by: INTERNAL MEDICINE

## 2021-05-20 PROCEDURE — 74011636637 HC RX REV CODE- 636/637: Performed by: HOSPITALIST

## 2021-05-20 PROCEDURE — 97161 PT EVAL LOW COMPLEX 20 MIN: CPT

## 2021-05-20 PROCEDURE — 80069 RENAL FUNCTION PANEL: CPT

## 2021-05-20 PROCEDURE — 83036 HEMOGLOBIN GLYCOSYLATED A1C: CPT

## 2021-05-20 PROCEDURE — 74011250636 HC RX REV CODE- 250/636: Performed by: NURSE PRACTITIONER

## 2021-05-20 PROCEDURE — 65660000000 HC RM CCU STEPDOWN

## 2021-05-20 PROCEDURE — 74011250636 HC RX REV CODE- 250/636: Performed by: STUDENT IN AN ORGANIZED HEALTH CARE EDUCATION/TRAINING PROGRAM

## 2021-05-20 PROCEDURE — 77010033678 HC OXYGEN DAILY

## 2021-05-20 PROCEDURE — P9047 ALBUMIN (HUMAN), 25%, 50ML: HCPCS | Performed by: HOSPITALIST

## 2021-05-20 PROCEDURE — 99024 POSTOP FOLLOW-UP VISIT: CPT | Performed by: THORACIC SURGERY (CARDIOTHORACIC VASCULAR SURGERY)

## 2021-05-20 PROCEDURE — 74011000258 HC RX REV CODE- 258: Performed by: NURSE PRACTITIONER

## 2021-05-20 RX ORDER — ALBUMIN HUMAN 250 G/1000ML
25 SOLUTION INTRAVENOUS EVERY 6 HOURS
Status: DISCONTINUED | OUTPATIENT
Start: 2021-05-20 | End: 2021-05-21

## 2021-05-20 RX ORDER — VANCOMYCIN/0.9 % SOD CHLORIDE 1.5G/250ML
1500 PLASTIC BAG, INJECTION (ML) INTRAVENOUS EVERY 8 HOURS
Status: DISCONTINUED | OUTPATIENT
Start: 2021-05-20 | End: 2021-05-23 | Stop reason: DRUGHIGH

## 2021-05-20 RX ADMIN — Medication 10 ML: at 14:03

## 2021-05-20 RX ADMIN — ALBUMIN (HUMAN) 25 G: 0.25 INJECTION, SOLUTION INTRAVENOUS at 17:41

## 2021-05-20 RX ADMIN — ACETAMINOPHEN 650 MG: 325 TABLET ORAL at 17:42

## 2021-05-20 RX ADMIN — DOXYCYCLINE 100 MG: 100 INJECTION, POWDER, LYOPHILIZED, FOR SOLUTION INTRAVENOUS at 22:07

## 2021-05-20 RX ADMIN — INSULIN LISPRO 5 UNITS: 100 INJECTION, SOLUTION INTRAVENOUS; SUBCUTANEOUS at 07:27

## 2021-05-20 RX ADMIN — VANCOMYCIN HYDROCHLORIDE 1500 MG: 10 INJECTION, POWDER, LYOPHILIZED, FOR SOLUTION INTRAVENOUS at 19:32

## 2021-05-20 RX ADMIN — Medication 3 MG: at 22:07

## 2021-05-20 RX ADMIN — VANCOMYCIN HYDROCHLORIDE 1250 MG: 10 INJECTION, POWDER, LYOPHILIZED, FOR SOLUTION INTRAVENOUS at 11:16

## 2021-05-20 RX ADMIN — ATORVASTATIN CALCIUM 80 MG: 40 TABLET, FILM COATED ORAL at 09:38

## 2021-05-20 RX ADMIN — INSULIN LISPRO 7 UNITS: 100 INJECTION, SOLUTION INTRAVENOUS; SUBCUTANEOUS at 17:54

## 2021-05-20 RX ADMIN — INSULIN LISPRO 5 UNITS: 100 INJECTION, SOLUTION INTRAVENOUS; SUBCUTANEOUS at 11:34

## 2021-05-20 RX ADMIN — ALBUMIN (HUMAN) 12.5 G: 0.25 INJECTION, SOLUTION INTRAVENOUS at 00:40

## 2021-05-20 RX ADMIN — Medication 10 ML: at 06:55

## 2021-05-20 RX ADMIN — SODIUM CHLORIDE 500 ML: 9 INJECTION, SOLUTION INTRAVENOUS at 17:42

## 2021-05-20 RX ADMIN — PIPERACILLIN AND TAZOBACTAM 3.38 G: 3; .375 INJECTION, POWDER, LYOPHILIZED, FOR SOLUTION INTRAVENOUS at 06:53

## 2021-05-20 RX ADMIN — PIPERACILLIN AND TAZOBACTAM 3.38 G: 3; .375 INJECTION, POWDER, LYOPHILIZED, FOR SOLUTION INTRAVENOUS at 22:06

## 2021-05-20 RX ADMIN — ALBUMIN (HUMAN) 12.5 G: 0.25 INJECTION, SOLUTION INTRAVENOUS at 06:53

## 2021-05-20 RX ADMIN — INSULIN GLARGINE 15 UNITS: 100 INJECTION, SOLUTION SUBCUTANEOUS at 22:06

## 2021-05-20 RX ADMIN — DOXYCYCLINE 100 MG: 100 INJECTION, POWDER, LYOPHILIZED, FOR SOLUTION INTRAVENOUS at 09:38

## 2021-05-20 RX ADMIN — PIPERACILLIN AND TAZOBACTAM 3.38 G: 3; .375 INJECTION, POWDER, LYOPHILIZED, FOR SOLUTION INTRAVENOUS at 14:03

## 2021-05-20 RX ADMIN — VANCOMYCIN HYDROCHLORIDE 1250 MG: 10 INJECTION, POWDER, LYOPHILIZED, FOR SOLUTION INTRAVENOUS at 03:38

## 2021-05-20 NOTE — PROGRESS NOTES
Care Management Interventions  PCP Verified by CM: No  Palliative Care Criteria Met (RRAT>21 & CHF Dx)?: No  Mode of Transport at Discharge:  (TBD)  Transition of Care Consult (CM Consult):  (TBD)  Physical Therapy Consult: Yes  Occupational Therapy Consult: Yes  Speech Therapy Consult: No  Current Support Network: Lives with Caregiver (EX GIRLFRIEND STAYS WITH PATIENT ON OCCASION)  Confirm Follow Up Transport:  (TBD)  [employer] Provided?: No  Discharge Location  Discharge Placement:  (TBD)

## 2021-05-20 NOTE — PROGRESS NOTES
Pharmacist Note - Vancomycin Dosing  Therapy day 2  Indication:  Loculated complex right empyema, possible RLL lung abscess  Current regimen:  1250 mg IV every 8 hours    Recent Labs     05/20/21  0353 05/19/21  1101 05/19/21  0523 05/18/21  0532   WBC  --  15.1*  --  15.9*   CREA 0.46*  --  0.50* 0.48*   BUN 4*  --  6 7       A Trough Level resulted at 12.2 mcg/mL which was obtained 7 hrs post-dose. Goal trough: 15 - 20 mcg/mL     Plan: Change to 1500 mg q8h. Pharmacy will continue to monitor this patient daily for changes in clinical status and renal function.

## 2021-05-20 NOTE — PROGRESS NOTES
PHYSICAL THERAPY EVALUATION/DISCHARGE  Patient: Viola Doll (89 y.o. male)  Date: 5/20/2021  Primary Diagnosis: Lung abscess ([facility] [J85.2]  Procedure(s) (LRB):  RIGHT VATS, DECORTICATION (URGENT) (Right)     Precautions:          ASSESSMENT  Based on the objective data described below, the patient presents with decreased activity tolerance d/t lung abscess but is otherwise independent with mobility. Ambulated around the room without issue or LOB. No dyspnea noted but unable to check O2 sats as there was no pulse ox in his airborne precaution room. Recommend to patient ambulating with nursing after planned VATS tomorrow and when off airborne precautions (TB rule out). No further PT needs at this time. Please re-consult if any needs arise. Other factors to consider for discharge: medical clearance     Further skilled acute physical therapy is not indicated at this time. PLAN :  Recommendation for discharge: (in order for the patient to meet his/her long term goals)  No skilled physical therapy/ follow up rehabilitation needs identified at this time. This discharge recommendation:  Has not yet been discussed the attending provider and/or case management    IF patient discharges home will need the following DME: none       SUBJECTIVE:   Patient stated I've been walking to the bathroom.     OBJECTIVE DATA SUMMARY:   HISTORY:    No past medical history on file. No past surgical history on file. Prior level of function: independent without device  Personal factors and/or comorbidities impacting plan of care:          EXAMINATION/PRESENTATION/DECISION MAKING:   Critical Behavior:              Hearing: Auditory  Auditory Impairment: None  Skin:  NT  Edema: NT  Range Of Motion:  AROM: Within functional limits           PROM: Within functional limits           Strength:    Strength:  Within functional limits                    Tone & Sensation:   Tone: Normal Coordination:  Coordination: Within functional limits  Vision:      Functional Mobility:  Bed Mobility:  Rolling: Independent  Supine to Sit: Independent  Sit to Supine: Independent  Scooting: Independent  Transfers:  Sit to Stand: Independent  Stand to Sit: Independent                       Balance:   Sitting: Intact  Standing: Intact; With support  Ambulation/Gait Training:  Distance (ft): 30 Feet (ft)  Assistive Device:  (holding IV pole)  Ambulation - Level of Assistance: Independent      gait limited to in room only d/t airborne precautions for TB rule out. Physical Therapy Evaluation Charge Determination   History Examination Presentation Decision-Making   MEDIUM  Complexity : 1-2 comorbidities / personal factors will impact the outcome/ POC  LOW Complexity : 1-2 Standardized tests and measures addressing body structure, function, activity limitation and / or participation in recreation  LOW Complexity : Stable, uncomplicated  LOW Complexity : FOTO score of       Based on the above components, the patient evaluation is determined to be of the following complexity level: LOW     Pain Rating:  No pain reported    Activity Tolerance:   Good      After treatment patient left in no apparent distress:   Supine in bed    COMMUNICATION/EDUCATION:   The patients plan of care was discussed with: Registered nurse. OT    Fall prevention education was provided and the patient/caregiver indicated understanding. and Patient/family have participated as able in goal setting and plan of care.     Thank you for this referral.  Blaine Deal, PT   Time Calculation: 10 mins

## 2021-05-20 NOTE — PROGRESS NOTES
[facility]  Hospitalist Group                                                                                          Hospitalist Progress Note  Radha Morocho DO  Answering service: [phone] OR [phone] from in house phone     2021 : Date of admission    Date of Service:  2021  NAME:  Marisol Caldwell  :  1975  MRN:  [id number]      Admission Summary:   Clara Tsang is a 39 y. o. male with past medical history of CAD, tobacco use disorder, non-insulin-dependent type 2 diabetes, dyslipidemia, hypertension who [facility] with multiple complaints. He was admitted to [facility] on 21.  Patient endorsed a 1 month history of recurrent fevers, chills, night sweats, malaise.  He was a cigarette smoker, down from 2 packs prior to his MI last year. [facility]  had no recent travel or sick contacts. Stefanie Rodriguez was not up-to-date on Covid vaccination. Patient states that he has a chronic cough which really did not change much prior to presentation. He states that what changed over the past month was that he was having right sided chest pain. This was mostly on the right lateral thorax. Patient was found to have right complex loculated pleural effusion, possible lung abscess. Thoracic surgery was consulted. Interval history / Subjective:      Patient is currently on broad-spectrum antibiotics with IV vancomycin, IV Zosyn. He continues to spike fevers at times. Patient is also mildly tachycardic persistently. He continues to have right sided chest pain. Assessment & Plan:     1. Right empyema. Patient is currently on broad spectrum antibiotics with IV vancomycin, IV Zosyn, doxycycline. He is scheduled to have decortication of the right lung via VATS by thoracic surgery on May 21, 2021. Patient remains in airborne respiratory isolation while TB is being ruled out. 2. Coronary artery disease.  Patient had stents x 2 to RCA in April, 2020. In the setting of noncompliance to dual antiplatelet therapy, he developed instent stenosis, treated with PCI and furthermore,  had stent placed to the LAD on September 29, 2020.    3. Severe hypoalbuminemia. Albumin  25 g IV Q6 hours X 8 doses. 4.  Persistent tachycardia. Feel that patient is behind in fluids. There was some concern because his sodium level went down to 129, and he was placed on fluid restriction to 1.5 L for possible SIADH. However, I feel that currently, the benefits of fluids outweigh the risks of mild hyponatremia secondary to SIADH. Will give fluids with normal saline, Bolus of 500 mL, followed by infusion of 100 mL per hour. 5.  Insulin-dependent diabetes mellitus. Increase Lantus to 25 units nightly from 15 units nightly, adjunctive sliding scale insulin. s 15 units nightly, adjunctive Sliding scale insulin. 6. Mild hyponatremia. Continue to monitor.       Code status: full      Care Plan discussed with: Nurse  Anticipated Disposition: Home w/Family  Anticipated Discharge: Greater than 48 hours     Hospital Problems  Never Reviewed        Codes Class Noted POA    [facility] ICD-10-CM: J85.2  ICD-9-CM: 513.0  5/18/2021 Unknown              Current Facility-Administered Medications:     diphenhydrAMINE (BENADRYL) capsule 25 mg, 25 mg, Oral, Q6H PRN, Abdi Vargas NP, 25 mg at 05/21/21 0120    albumin human 25% (BUMINATE) solution 25 g, 25 g, IntraVENous, Q6H, Lm Serra DO, 25 g at 05/21/21 0121    [COMPLETED] vancomycin (VANCOCIN) 2000 mg in  ml infusion, 2,000 mg, IntraVENous, ONCE, Stopped at 05/20/21 0050 **FOLLOWED BY** vancomycin (VANCOCIN) 1500 mg in  ml infusion, 1,500 mg, IntraVENous, Q8H, Belen Hernandez NP, Last Rate: 250 mL/hr at 05/20/21 1932, 1,500 mg at 05/20/21 1932    Vancomycin - Pharmacy to Dose, , Other, Rx Dosing/Monitoring, Belen Hernandez NP    insulin lispro (HUMALOG) injection, , SubCUTAneous, TIDAC, Estuardo Maldonado MD, 7 Units at 05/20/21 1754    glucose chewable tablet 16 g, 4 Tablet, Oral, PRN, Johanna Graham MD    dextrose (D50W) injection syrg 12.5-25 g, 12.5-25 g, IntraVENous, PRN, Johanna Graham MD    glucagon (GLUCAGEN) injection 1 mg, 1 mg, IntraMUSCular, PRN, Johanna Graham MD    insulin glargine (LANTUS) injection 15 Units, 15 Units, SubCUTAneous, QHS, Shanthi Davis MD, 15 Units at 05/20/21 2206    piperacillin-tazobactam (ZOSYN) 3.375 g in 0.9% sodium chloride (MBP/ADV) 100 mL MBP, 3.375 g, IntraVENous, Q8H, Noah Pugh MD, Last Rate: 25 mL/hr at 05/20/21 2206, 3.375 g at 05/20/21 2206    [Held by provider] metoprolol succinate (TOPROL-XL) XL tablet 50 mg, 50 mg, Oral, DAILY, Noah Pugh MD    atorvastatin (LIPITOR) tablet 80 mg, 80 mg, Oral, DAILY, Noah Pugh MD, 80 mg at 05/20/21 0938    nicotine (NICODERM CQ) 21 mg/24 hr patch 1 Patch, 1 Patch, TransDERmal, DAILY, Noah Pugh MD    sodium chloride (NS) flush 5-40 mL, 5-40 mL, IntraVENous, Q8H, Noah Pugh MD, 10 mL at 05/20/21 1403    sodium chloride (NS) flush 5-40 mL, 5-40 mL, IntraVENous, PRN, Noah Pugh MD    acetaminophen (TYLENOL) tablet 650 mg, 650 mg, Oral, Q6H PRN, 650 mg at 05/20/21 1742 **OR** acetaminophen (TYLENOL) suppository 650 mg, 650 mg, Rectal, Q6H PRN, Noah Pugh MD    polyethylene glycol (MIRALAX) packet 17 g, 17 g, Oral, DAILY PRN, Noah Pugh MD    promethazine (PHENERGAN) tablet 12.5 mg, 12.5 mg, Oral, Q6H PRN **OR** ondansetron (ZOFRAN) injection 4 mg, 4 mg, IntraVENous, Q6H PRN, Noah Pugh MD    0.9% sodium chloride infusion, 100 mL/hr, IntraVENous, CONTINUOUS, Ana CULVER, DO, Last Rate: 100 mL/hr at 05/20/21 1743, 100 mL/hr at 05/20/21 1743    doxycycline (VIBRAMYCIN) 100 mg in 0.9% sodium chloride (MBP/ADV) 100 mL MBP, 100 mg, IntraVENous, Q12H, Belen Heranndez, NP, Last Rate: 100 mL/hr at 05/20/21 2207, 100 mg at 05/20/21 2207    melatonin tablet 3 mg, 3 mg, Oral, QHS PRN, Dustin Eubanks MD, 3 mg at 05/20/21 2207    Review of Systems:   A comprehensive review of systems was negative except for that written in the HPI. Vital Signs:    Last 24hrs VS reviewed since prior progress note. Most recent are:  Visit Vitals  /74 (BP 1 Location: Right upper arm, BP Patient Position: At rest)   Pulse 96   Temp 100.4 °F (38 °C)   Resp 17   Ht 5' 6\" (1.676 m)   Wt 97.3 kg (214 lb 9.6 oz)   Oxygen therapy 2 L/min   SpO2 95%   BMI 34.64 kg/m²         Intake/Output Summary (Last 24 hours) at 5/20/2021 1227  Last data filed at 5/20/2021 0546  Gross per 24 hour   Intake 1100 ml   Output 2550 ml   Net -1450 ml        Physical Examination:     I had a face to face encounter with this patient and independently examined them on 5/20/2021 as outlined below:          Constitutional:  No acute distress, cooperative, pleasant    ENT:  Oral mucosa moist, oropharynx benign. Resp:  CTA bilaterally. No wheezing/rhonchi/rales. No accessory muscle use   CV:  Regular rhythm, tachycardic, no murmurs, gallops, rubs    GI:  Soft, non distended, non tender. normoactive bowel sounds, no hepatosplenomegaly     Musculoskeletal:  No edema, warm, 2+ pulses throughout    Neurologic:  No focal deficits. AAOx3, CN II-XII reviewed  Eyes: PERRL, anicteric  Skin: No rashes, no jaundice.             Data Review:       Labs:     Recent Labs     05/19/21  1101 05/18/21  0532   WBC 15.1* 15.9*   HGB 9.4* 10.2*   HCT 30.4* 32.5*   * 579*     Recent Labs     05/20/21  0353 05/19/21  0523 05/18/21  0532   * 134* 129*   K 3.4* 3.2* 3.5   CL 98 96* 94*   CO2 26 30 26   BUN 4* 6 7   CREA 0.46* 0.50* 0.48*   * 264* 280*   CA 8.5 8.0* 7.9*   PHOS 2.9 2.2*  --      Recent Labs     05/20/21  0353 05/19/21  0523 05/18/21  0532   ALT  --   --  36   AP  --   --  128*   TBILI  --   --  0.6   TP  --   --  7.2   ALB 2.0* 1.9* 1.4*   GLOB  --   --  5.8* ______________________________________________________________________  EXPECTED LENGTH OF STAY: - - -  ACTUAL LENGTH OF STAY:          27 Aimee Ochoa DO

## 2021-05-20 NOTE — PROGRESS NOTES
TRANSITIONS OF CARE NOTE  This CRM was unable to obtain pertinent information from patient yesterday. I went back and patient states he is feeling better,  He states his right lung doesn't hurt so much today. He will have a VAT procedure on Friday May 21st.  Patient states he is now living with his mom and brother Carter Muro. He doesn't know Vito's phone or if it works. He states the best phone to use is the one on his facesheet and his mom will answer. He then mentioned his ex-wife might be his caregiver and he would stay with her. He was unable to provide address for her or confirm phone at this time. He is asking if the hospital could find him an apartment as he doesn't want to go back to his present address. I informed him that post op he would need to consider being at his ex wife's or his mom's home versus a shelter. Patient now confirms he has an [place] Medicaid product. His mom does drive and could provide him with a ride from hospital.  He uses the [facility] in [place] when he does fill his medications. The current plan is VAT in am and stabilize in hospital and then return to the residence he was living in and that he call his mom to provide a ride or have medicaid provide a long distance ride. [facility] will follow for transitions of care needs.

## 2021-05-20 NOTE — PROGRESS NOTES
Problem: Falls - Risk of  Goal: *Absence of Falls  Description: Document Ronald Machuca Fall Risk and appropriate interventions in the flowsheet.   Outcome: Progressing Towards Goal  Note: Fall Risk Interventions:            Medication Interventions: Patient to call before getting OOB

## 2021-05-20 NOTE — PROGRESS NOTES
Verbal shift change report given to Phillip Gabriel RN (oncoming nurse) by Robyn Gonzalez (offgoing nurse). Report included the following information SBAR, Kardex and MAR.

## 2021-05-20 NOTE — PROGRESS NOTES
[facility]   [address] [address], [address], [facility]  Phone: [phone]   [phone]       Nephrology Progress Note  Erica Pope     1975     [id number]  Date of Admission : 5/18/2021 05/20/21    CC: Follow up for Hyponatremia     Assessment and Plan   Hyponatremia:  - 2/2 SIADH from pulmonary pathology  - CT scan showing multilobar PNA w/ empyema   - urine chems suggesting SIADH picture   - FR 1.5L/d for now   - daily Na levels  - signing off. We will see him again as needed    Hypo K :  - replete PRN      Multilobar PNA:  R multiloculated empyema   - per primary team   - VATS planned by thoracics     Hypotension:  -resolved      DM2  CAD  Tobacco use     Interval History:  Corrected Na normal   BG higg. A1C > 13%  Not seen in room due to isolation     Review of Systems: Review of systems not obtained due to patient factors.     Current Medications:   Current Facility-Administered Medications   Medication Dose Route Frequency    Vancomycin - Pharmacy to Dose   Other Rx Dosing/Monitoring    vancomycin (VANCOCIN) 1250 mg in  ml infusion  1,250 mg IntraVENous Q8H    insulin lispro (HUMALOG) injection   SubCUTAneous TIDAC    glucose chewable tablet 16 g  4 Tablet Oral PRN    dextrose (D50W) injection syrg 12.5-25 g  12.5-25 g IntraVENous PRN    glucagon (GLUCAGEN) injection 1 mg  1 mg IntraMUSCular PRN    insulin glargine (LANTUS) injection 15 Units  15 Units SubCUTAneous QHS    piperacillin-tazobactam (ZOSYN) 3.375 g in 0.9% sodium chloride (MBP/ADV) 100 mL MBP  3.375 g IntraVENous Q8H    [Held by provider] metoprolol succinate (TOPROL-XL) XL tablet 50 mg  50 mg Oral DAILY    atorvastatin (LIPITOR) tablet 80 mg  80 mg Oral DAILY    nicotine (NICODERM CQ) 21 mg/24 hr patch 1 Patch  1 Patch TransDERmal DAILY    sodium chloride (NS) flush 5-40 mL  5-40 mL IntraVENous Q8H    sodium chloride (NS) flush 5-40 mL  5-40 mL IntraVENous PRN    acetaminophen (TYLENOL) tablet 650 mg  650 mg Oral Q6H PRN    Or    acetaminophen (TYLENOL) suppository 650 mg  650 mg Rectal Q6H PRN    polyethylene glycol (MIRALAX) packet 17 g  17 g Oral DAILY PRN    promethazine (PHENERGAN) tablet 12.5 mg  12.5 mg Oral Q6H PRN    Or    ondansetron (ZOFRAN) injection 4 mg  4 mg IntraVENous Q6H PRN    [Held by provider] 0.9% sodium chloride infusion  125 mL/hr IntraVENous CONTINUOUS    doxycycline (VIBRAMYCIN) 100 mg in 0.9% sodium chloride (MBP/ADV) 100 mL MBP  100 mg IntraVENous Q12H    melatonin tablet 3 mg  3 mg Oral QHS PRN      No Known Allergies    Objective:  Vitals:    Vitals:    05/20/21 0114 05/20/21 0400 05/20/21 0854 05/20/21 0915   BP: 124/77 137/80 122/74    Pulse: (!) 101 94 (!) 117 96   Resp: 18 17 17    Temp: 97.8 °F (36.6 °C) 98.4 °F (36.9 °C) 100.4 °F (38 °C)    SpO2: 94% 95% 95%    Weight:  97.3 kg (214 lb 9.6 oz)     Height:         Intake and Output:  No intake/output data recorded. 05/18 1901 - 05/20 0700  In: 1400 [P.O.:1400]  Out: 2550 [Urine:2550]    Physical Examination:  Not examined in room     []    High complexity decision making was performed  []    Patient is at high-risk of decompensation with multiple organ involvement    Lab Data Personally Reviewed: I have reviewed all the pertinent labs, microbiology data and radiology studies during assessment.     Recent Labs     05/20/21  0353 05/19/21  0523 05/18/21  0532   * 134* 129*   K 3.4* 3.2* 3.5   CL 98 96* 94*   CO2 26 30 26   * 264* 280*   BUN 4* 6 7   CREA 0.46* 0.50* 0.48*   CA 8.5 8.0* 7.9*   PHOS 2.9 2.2*  --    ALB 2.0* 1.9* 1.4*   ALT  --   --  36     Recent Labs     05/19/21  1101 05/18/21  0532   WBC 15.1* 15.9*   HGB 9.4* 10.2*   HCT 30.4* 32.5*   * 579*     No results found for: SDES  Lab Results   Component Value Date/Time    Culture result: NO GROWTH AFTER 17 HOURS 05/19/2021 11:01 AM    Culture result: No growth (<1,000 CFU/ML) 05/18/2021 03:30 PM    Culture result: FEW STREPTOCOCCI, BETA HEMOLYTIC GROUP A (A) 05/18/2021 05:33 AM    Culture result: LIGHT NORMAL RESPIRATORY RAMIRO 05/18/2021 05:33 AM    Culture result: NO GROWTH 2 DAYS 05/18/2021 05:32 AM     Recent Results (from the past 24 hour(s))   GLUCOSE, POC    Collection Time: 05/19/21  5:26 PM   Result Value Ref Range    Glucose (POC) 316 (H) 65 - 117 mg/dL    Performed by Jaspreet Mayberry    GLUCOSE, POC    Collection Time: 05/19/21  9:36 PM   Result Value Ref Range    Glucose (POC) 291 (H) 65 - 117 mg/dL    Performed by Abeba Chavarria  PCT    RENAL FUNCTION PANEL    Collection Time: 05/20/21  3:53 AM   Result Value Ref Range    Sodium 135 (L) 136 - 145 mmol/L    Potassium 3.4 (L) 3.5 - 5.1 mmol/L    Chloride 98 97 - 108 mmol/L    CO2 26 21 - 32 mmol/L    Anion gap 11 5 - 15 mmol/L    Glucose 272 (H) 65 - 100 mg/dL    BUN 4 (L) 6 - 20 MG/DL    Creatinine 0.46 (L) 0.70 - 1.30 MG/DL    BUN/Creatinine ratio 9 (L) 12 - 20      GFR est AA >60 >60 ml/min/1.73m2    GFR est non-AA >60 >60 ml/min/1.73m2    Calcium 8.5 8.5 - 10.1 MG/DL    Phosphorus 2.9 2.6 - 4.7 MG/DL    Albumin 2.0 (L) 3.5 - 5.0 g/dL   HEMOGLOBIN A1C WITH EAG    Collection Time: 05/20/21  3:54 AM   Result Value Ref Range    Hemoglobin A1c 13.2 (H) 4.0 - 5.6 %    Est. average glucose 332 mg/dL   GLUCOSE, POC    Collection Time: 05/20/21  7:14 AM   Result Value Ref Range    Glucose (POC) 256 (H) 65 - 117 mg/dL    Performed by [address], POC    Collection Time: 05/20/21 11:16 AM   Result Value Ref Range    Glucose (POC) 298 (H) 65 - 117 mg/dL    Performed by Tomas Vila            Total time spent with patient:  xxx   min. Care Plan discussed with:  Patient     Family      RN      Consulting Physician [address] [place] [address],         I have reviewed the flowsheets. Chart and Pertinent Notes have been reviewed. No change in PMH ,family and social history from Consult note.       Khushboo Cuevas MD

## 2021-05-20 NOTE — PROGRESS NOTES
05/20/21 1634   Vital Signs   Temp 99.6 °F (37.6 °C)   Temp Source Oral   Pulse (Heart Rate) (!) 121   Heart Rate Source Monitor   Resp Rate 18   O2 Sat (%) 94 %   Level of Consciousness Alert (0)   /71   MAP (Calculated) 87   BP 1 Method Automatic   BP 1 Location Right upper arm   BP Patient Position At rest   MEWS Score 3   Oxygen Therapy   O2 Device Nasal cannula   O2 Flow Rate (L/min) 2 l/min     MD aware of tachycardia, low grade fever,

## 2021-05-20 NOTE — PROGRESS NOTES
Pt remains tachycardic, on telemetry monitoring, MD aware     05/19/21 2137   Vitals   Temp 98.2 °F (36.8 °C)   Temp Source Oral   Pulse (Heart Rate) (!) 118   Heart Rate Source Monitor   Resp Rate 21   O2 Sat (%) 95 %   Level of Consciousness Alert (0)   /69   MAP (Calculated) 92   BP 1 Location Right upper arm   BP 1 Method Automatic   BP Patient Position Supine   MEWS Score 4   Height/Weight   Weight 81.6 kg (179 lb 12.8 oz)   Weight Source Bed scale   BMI (calculated) 29

## 2021-05-20 NOTE — PROGRESS NOTES
Mr. Yris Alvarado is admitted for a right empyema. Seen by ID>  No acute events overnight. He reports feeling run down. Febrile T max 101.8  HD stable    On exam he is seated upright  Alert and oriented  Pale  Right lung diminished breath sounds  Tachycardic    I have personally reviewed his chest CT. Loculated complex right empyema, possible RLL lung abscess    WBC 15.1    Diagnosis  1- Right empyema    Mr. Tsang had a complex right empyema. Posted for a VATS decortication tomorrow. Will place pre-op orders.

## 2021-05-20 NOTE — PROGRESS NOTES
ID Progress Note  2021    Subjective:   C/o SOB    Review of Systems:            Symptom Y/N Comments   Symptom Y/N Comments   Fever/Chills  n     Chest Pain n       Poor Appetite       Edema        Cough  y     Abdominal Pain        Sputum  y     Joint Pain        SOB/WARE  y     Pruritis/Rash        Nausea/vomit  n     Tolerating PT/OT        Diarrhea  n     Tolerating Diet        Constipation  n     Other           Could NOT obtain due to:       Objective:     Vitals:   Visit Vitals  /80 (BP 1 Location: Right upper arm, BP Patient Position: Supine)   Pulse 94   Temp 98.4 °F (36.9 °C)   Resp 17   Ht 5' 6\" (1.676 m) Comment: Per patient report 2021   Wt 97.3 kg (214 lb 9.6 oz)   SpO2 95%   BMI 34.64 kg/m²        Tmax:  Temp (24hrs), Av.1 °F (37.3 °C), Min:97.8 °F (36.6 °C), Max:101.8 °F (38.8 °C)      PHYSICAL EXAM:  General: Chronically ill appearing, WD, WN. Alert, cooperative, no acute distress    EENT:  EOMI. Anicteric sclerae. MMM  Resp:  Coarse breath sounds in apex with diminished breath sounds at bases, no wheezing or rales. No accessory muscle use  CV:  Regular  rhythm,  No edema  GI:  Soft, Non distended, Non tender. +Bowel sounds  Neurologic:  Alert and oriented X 3, normal speech,   Psych:   Good insight. Not anxious nor agitated  Skin:  No rashes.   No jaundice    Labs:   Lab Results   Component Value Date/Time    WBC 15.1 (H) 2021 11:01 AM    HGB 9.4 (L) 2021 11:01 AM    HCT 30.4 (L) 2021 11:01 AM    PLATELET 348 (H)  11:01 AM    MCV 83.5 2021 11:01 AM     Lab Results   Component Value Date/Time    Sodium 135 (L) 2021 03:53 AM    Potassium 3.4 (L) 2021 03:53 AM    Chloride 98 2021 03:53 AM    CO2 26 2021 03:53 AM    Anion gap 11 2021 03:53 AM    Glucose 272 (H) 2021 03:53 AM    BUN 4 (L) 2021 03:53 AM    Creatinine 0.46 (L) 2021 03:53 AM    BUN/Creatinine ratio 9 (L) 2021 03:53 AM    GFR est AA >60 05/20/2021 03:53 AM    GFR est non-AA >60 05/20/2021 03:53 AM    Calcium 8.5 05/20/2021 03:53 AM    Bilirubin, total 0.6 05/18/2021 05:32 AM    Alk. phosphatase 128 (H) 05/18/2021 05:32 AM    Protein, total 7.2 05/18/2021 05:32 AM    Albumin 2.0 (L) 05/20/2021 03:53 AM    Globulin 5.8 (H) 05/18/2021 05:32 AM    A-G Ratio 0.2 (L) 05/18/2021 05:32 AM    ALT (SGPT) 36 05/18/2021 05:32 AM         Assessment and Plan   CAP   right lung abscess/right empyema  leukocytosis  - COVID 19; rapid & PCR negative    WBC 15.1, afebrile overnight    Blood cx (5/18) no growth so far    Fungal blood cx (5/19) pending    Urin x (5/18) no growth    procal 1.93    O2 @ 2L via n/c    HIV (-)   rule out TB; AFB (-) x1, two more sample pending, quantiferon, aspergillus galact Ag pending   respiratory panel (-)   legionella, strep pneumo, and mycoplasma pending   CT of chest (5/18):  1. Multiloculated right empyema. 2. Right lower lobe pneumonia. 3. Right upper lobe pneumonia with possible small lung abscess. 4. Emphysema. 5. Coronary artery disease. Appreciate CTS team's input; scheduled to have Right VATS 5/21     continue with IV zosyn, doxycyline, and vancomycin   adjust ABX prn   fever work up if temp >= 100.4     DO NOT GIVE FLOUROQUINOLONE!!   -> Since flouroquinolone could potentially provide falsely negative AFBs. Wait for AFB results first before starting flouroquinolone.      Poorly controlled Type II DM  - A1C 13.2, management per primary team; continue with insulin therapy     CAD  - continue with ASA and statin      Belen Renteria NP

## 2021-05-20 NOTE — ROUTINE PROCESS
Bedside shift change report given to myrna black rn (oncoming nurse) by Carol Hahn (offgoing nurse). Report included the following information SBAR and Kardex.

## 2021-05-20 NOTE — PROGRESS NOTES
Occupational Therapy    Chart reviewed, noted seen by thoracic surgery, dx of complex right empyema, diminished breath sounds, continued tachycardic at rest, and planned VATS decortication tomorrow, will follow up afterwards as appropriate for therapy evaluation. Ray Rees.  Tory, MS OTR/L

## 2021-05-21 ENCOUNTER — APPOINTMENT (OUTPATIENT)
Dept: GENERAL RADIOLOGY | Age: 46
DRG: 121 | End: 2021-05-21
Attending: THORACIC SURGERY (CARDIOTHORACIC VASCULAR SURGERY)
Payer: COMMERCIAL

## 2021-05-21 ENCOUNTER — ANESTHESIA (OUTPATIENT)
Dept: SURGERY | Age: 46
DRG: 121 | End: 2021-05-21
Payer: COMMERCIAL

## 2021-05-21 ENCOUNTER — ANESTHESIA EVENT (OUTPATIENT)
Dept: SURGERY | Age: 46
DRG: 121 | End: 2021-05-21
Payer: COMMERCIAL

## 2021-05-21 LAB
ALBUMIN SERPL-MCNC: 2.7 G/DL (ref 3.5–5)
ALBUMIN/GLOB SERPL: 0.6 {RATIO} (ref 1.1–2.2)
ALP SERPL-CCNC: 114 U/L (ref 45–117)
ALT SERPL-CCNC: 28 U/L (ref 12–78)
ANION GAP SERPL CALC-SCNC: 8 MMOL/L (ref 5–15)
APPEARANCE FLD: ABNORMAL
AST SERPL-CCNC: 35 U/L (ref 15–37)
BASOPHILS # BLD: 0.1 K/UL (ref 0–0.1)
BASOPHILS NFR BLD: 1 % (ref 0–1)
BILIRUB SERPL-MCNC: 0.5 MG/DL (ref 0.2–1)
BUN SERPL-MCNC: 5 MG/DL (ref 6–20)
BUN/CREAT SERPL: 10 (ref 12–20)
CALCIUM SERPL-MCNC: 8.6 MG/DL (ref 8.5–10.1)
CHLORIDE SERPL-SCNC: 101 MMOL/L (ref 97–108)
CO2 SERPL-SCNC: 28 MMOL/L (ref 21–32)
COLOR FLD: ABNORMAL
CREAT SERPL-MCNC: 0.48 MG/DL (ref 0.7–1.3)
DIFFERENTIAL METHOD BLD: ABNORMAL
EOSINOPHIL # BLD: 0.1 K/UL (ref 0–0.4)
EOSINOPHIL NFR BLD: 1 % (ref 0–7)
ERYTHROCYTE [DISTWIDTH] IN BLOOD BY AUTOMATED COUNT: 14.4 % (ref 11.5–14.5)
GALACTOMANNAN AG SPEC IA-ACNC: 0.8 INDEX (ref 0–0.49)
GLOBULIN SER CALC-MCNC: 4.9 G/DL (ref 2–4)
GLUCOSE BLD STRIP.AUTO-MCNC: 275 MG/DL (ref 65–117)
GLUCOSE BLD STRIP.AUTO-MCNC: 278 MG/DL (ref 65–117)
GLUCOSE BLD STRIP.AUTO-MCNC: 295 MG/DL (ref 65–117)
GLUCOSE BLD STRIP.AUTO-MCNC: 588 MG/DL (ref 65–117)
GLUCOSE FLD-MCNC: 168 MG/DL
GLUCOSE SERPL-MCNC: 290 MG/DL (ref 65–100)
HCT VFR BLD AUTO: 28.6 % (ref 36.6–50.3)
HGB BLD-MCNC: 8.8 G/DL (ref 12.1–17)
IMM GRANULOCYTES # BLD AUTO: 0.2 K/UL (ref 0–0.04)
IMM GRANULOCYTES NFR BLD AUTO: 2 % (ref 0–0.5)
LDH FLD L TO P-CCNC: >4000 U/L
LYMPHOCYTES # BLD: 1.5 K/UL (ref 0.8–3.5)
LYMPHOCYTES NFR BLD: 12 % (ref 12–49)
LYMPHOCYTES NFR FLD: 11 %
M PNEUMO IGG SER IA-ACNC: 146 U/ML (ref 0–99)
M PNEUMO IGM SER IA-ACNC: <770 U/ML (ref 0–769)
M TB IFN-G BLD-IMP: ABNORMAL
MCH RBC QN AUTO: 25.5 PG (ref 26–34)
MCHC RBC AUTO-ENTMCNC: 30.8 G/DL (ref 30–36.5)
MCV RBC AUTO: 82.9 FL (ref 80–99)
MONOCYTES # BLD: 1.2 K/UL (ref 0–1)
MONOCYTES NFR BLD: 9 % (ref 5–13)
MONOS+MACROS NFR FLD: 8 %
NEUTROPHILS NFR FLD: 81 %
NEUTS SEG # BLD: 9.7 K/UL (ref 1.8–8)
NEUTS SEG NFR BLD: 75 % (ref 32–75)
NRBC # BLD: 0 K/UL (ref 0–0.01)
NRBC BLD-RTO: 0 PER 100 WBC
NUC CELL # FLD: ABNORMAL /CU MM
PLATELET # BLD AUTO: 546 K/UL (ref 150–400)
PMV BLD AUTO: 10 FL (ref 8.9–12.9)
POTASSIUM SERPL-SCNC: 3.4 MMOL/L (ref 3.5–5.1)
PROT FLD-MCNC: 5.8 G/DL
PROT SERPL-MCNC: 7.6 G/DL (ref 6.4–8.2)
QUANTIFERON CRITERIA, QFI1T: ABNORMAL
QUANTIFERON MITOGEN VALUE: 0.4 IU/ML
QUANTIFERON NIL VALUE: 0 IU/ML
QUANTIFERON TB1 AG: 0 IU/ML
QUANTIFERON TB2 AG: 0 IU/ML
RBC # BLD AUTO: 3.45 M/UL (ref 4.1–5.7)
RBC # FLD: >100 /CU MM
SERVICE CMNT-IMP: ABNORMAL
SODIUM SERPL-SCNC: 137 MMOL/L (ref 136–145)
SPECIMEN SOURCE FLD: ABNORMAL
SPECIMEN SOURCE FLD: NORMAL
WBC # BLD AUTO: 12.8 K/UL (ref 4.1–11.1)

## 2021-05-21 PROCEDURE — 85025 COMPLETE CBC W/AUTO DIFF WBC: CPT

## 2021-05-21 PROCEDURE — 74011000250 HC RX REV CODE- 250: Performed by: THORACIC SURGERY (CARDIOTHORACIC VASCULAR SURGERY)

## 2021-05-21 PROCEDURE — P9047 ALBUMIN (HUMAN), 25%, 50ML: HCPCS | Performed by: INTERNAL MEDICINE

## 2021-05-21 PROCEDURE — 87102 FUNGUS ISOLATION CULTURE: CPT

## 2021-05-21 PROCEDURE — 77030040361 HC SLV COMPR DVT MDII -B: Performed by: THORACIC SURGERY (CARDIOTHORACIC VASCULAR SURGERY)

## 2021-05-21 PROCEDURE — 74011000250 HC RX REV CODE- 250: Performed by: NURSE PRACTITIONER

## 2021-05-21 PROCEDURE — 36415 COLL VENOUS BLD VENIPUNCTURE: CPT

## 2021-05-21 PROCEDURE — 77010033678 HC OXYGEN DAILY

## 2021-05-21 PROCEDURE — 77030003666 HC NDL SPINAL BD -A: Performed by: THORACIC SURGERY (CARDIOTHORACIC VASCULAR SURGERY)

## 2021-05-21 PROCEDURE — 74011000258 HC RX REV CODE- 258: Performed by: THORACIC SURGERY (CARDIOTHORACIC VASCULAR SURGERY)

## 2021-05-21 PROCEDURE — 74011000258 HC RX REV CODE- 258: Performed by: NURSE PRACTITIONER

## 2021-05-21 PROCEDURE — 88305 TISSUE EXAM BY PATHOLOGIST: CPT

## 2021-05-21 PROCEDURE — 74011250636 HC RX REV CODE- 250/636: Performed by: THORACIC SURGERY (CARDIOTHORACIC VASCULAR SURGERY)

## 2021-05-21 PROCEDURE — 87186 SC STD MICRODIL/AGAR DIL: CPT

## 2021-05-21 PROCEDURE — 77030008671 HC TU ENDO/BRNC CUF COVD -B: Performed by: ANESTHESIOLOGY

## 2021-05-21 PROCEDURE — 87205 SMEAR GRAM STAIN: CPT

## 2021-05-21 PROCEDURE — C1729 CATH, DRAINAGE: HCPCS | Performed by: THORACIC SURGERY (CARDIOTHORACIC VASCULAR SURGERY)

## 2021-05-21 PROCEDURE — 74011250636 HC RX REV CODE- 250/636: Performed by: NURSE PRACTITIONER

## 2021-05-21 PROCEDURE — 82945 GLUCOSE OTHER FLUID: CPT

## 2021-05-21 PROCEDURE — 84157 ASSAY OF PROTEIN OTHER: CPT

## 2021-05-21 PROCEDURE — 76010000131 HC OR TIME 2 TO 2.5 HR: Performed by: THORACIC SURGERY (CARDIOTHORACIC VASCULAR SURGERY)

## 2021-05-21 PROCEDURE — 77030016151 HC PROTCTR LNS DFOG COVD -B: Performed by: THORACIC SURGERY (CARDIOTHORACIC VASCULAR SURGERY)

## 2021-05-21 PROCEDURE — 0BNF4ZZ RELEASE RIGHT LOWER LUNG LOBE, PERCUTANEOUS ENDOSCOPIC APPROACH: ICD-10-PCS | Performed by: THORACIC SURGERY (CARDIOTHORACIC VASCULAR SURGERY)

## 2021-05-21 PROCEDURE — 74011636637 HC RX REV CODE- 636/637: Performed by: THORACIC SURGERY (CARDIOTHORACIC VASCULAR SURGERY)

## 2021-05-21 PROCEDURE — 89050 BODY FLUID CELL COUNT: CPT

## 2021-05-21 PROCEDURE — 77030011264 HC ELECTRD BLD EXT COVD -A: Performed by: THORACIC SURGERY (CARDIOTHORACIC VASCULAR SURGERY)

## 2021-05-21 PROCEDURE — 77030018831 HC SOL IRR H20 BAXT -A: Performed by: THORACIC SURGERY (CARDIOTHORACIC VASCULAR SURGERY)

## 2021-05-21 PROCEDURE — 82962 GLUCOSE BLOOD TEST: CPT

## 2021-05-21 PROCEDURE — 02HV33Z INSERTION OF INFUSION DEVICE INTO SUPERIOR VENA CAVA, PERCUTANEOUS APPROACH: ICD-10-PCS | Performed by: HOSPITALIST

## 2021-05-21 PROCEDURE — 88341 IMHCHEM/IMCYTCHM EA ADD ANTB: CPT

## 2021-05-21 PROCEDURE — 83615 LACTATE (LD) (LDH) ENZYME: CPT

## 2021-05-21 PROCEDURE — 74011250636 HC RX REV CODE- 250/636: Performed by: INTERNAL MEDICINE

## 2021-05-21 PROCEDURE — 77030031139 HC SUT VCRL2 J&J -A: Performed by: THORACIC SURGERY (CARDIOTHORACIC VASCULAR SURGERY)

## 2021-05-21 PROCEDURE — 77030010507 HC ADH SKN DERMBND J&J -B: Performed by: THORACIC SURGERY (CARDIOTHORACIC VASCULAR SURGERY)

## 2021-05-21 PROCEDURE — 88342 IMHCHEM/IMCYTCHM 1ST ANTB: CPT

## 2021-05-21 PROCEDURE — 71045 X-RAY EXAM CHEST 1 VIEW: CPT

## 2021-05-21 PROCEDURE — 2709999900 HC NON-CHARGEABLE SUPPLY: Performed by: THORACIC SURGERY (CARDIOTHORACIC VASCULAR SURGERY)

## 2021-05-21 PROCEDURE — 74011000258 HC RX REV CODE- 258: Performed by: STUDENT IN AN ORGANIZED HEALTH CARE EDUCATION/TRAINING PROGRAM

## 2021-05-21 PROCEDURE — 77030002996 HC SUT SLK J&J -A: Performed by: THORACIC SURGERY (CARDIOTHORACIC VASCULAR SURGERY)

## 2021-05-21 PROCEDURE — 88312 SPECIAL STAINS GROUP 1: CPT

## 2021-05-21 PROCEDURE — 80053 COMPREHEN METABOLIC PANEL: CPT

## 2021-05-21 PROCEDURE — 76210000016 HC OR PH I REC 1 TO 1.5 HR: Performed by: THORACIC SURGERY (CARDIOTHORACIC VASCULAR SURGERY)

## 2021-05-21 PROCEDURE — 74011250636 HC RX REV CODE- 250/636: Performed by: STUDENT IN AN ORGANIZED HEALTH CARE EDUCATION/TRAINING PROGRAM

## 2021-05-21 PROCEDURE — 87116 MYCOBACTERIA CULTURE: CPT

## 2021-05-21 PROCEDURE — 32652 THORACOSCOPY REM TOTL CORTEX: CPT | Performed by: THORACIC SURGERY (CARDIOTHORACIC VASCULAR SURGERY)

## 2021-05-21 PROCEDURE — 77030018673: Performed by: THORACIC SURGERY (CARDIOTHORACIC VASCULAR SURGERY)

## 2021-05-21 PROCEDURE — 87077 CULTURE AEROBIC IDENTIFY: CPT

## 2021-05-21 PROCEDURE — 74011250637 HC RX REV CODE- 250/637: Performed by: THORACIC SURGERY (CARDIOTHORACIC VASCULAR SURGERY)

## 2021-05-21 PROCEDURE — 65660000000 HC RM CCU STEPDOWN

## 2021-05-21 PROCEDURE — 76060000035 HC ANESTHESIA 2 TO 2.5 HR: Performed by: THORACIC SURGERY (CARDIOTHORACIC VASCULAR SURGERY)

## 2021-05-21 PROCEDURE — 74011636637 HC RX REV CODE- 636/637: Performed by: HOSPITALIST

## 2021-05-21 PROCEDURE — 74011250637 HC RX REV CODE- 250/637: Performed by: NURSE PRACTITIONER

## 2021-05-21 PROCEDURE — 77030012390 HC DRN CHST BTL GTNG -B: Performed by: THORACIC SURGERY (CARDIOTHORACIC VASCULAR SURGERY)

## 2021-05-21 RX ORDER — KETOROLAC TROMETHAMINE 30 MG/ML
30 INJECTION, SOLUTION INTRAMUSCULAR; INTRAVENOUS EVERY 6 HOURS
Status: DISCONTINUED | OUTPATIENT
Start: 2021-05-21 | End: 2021-05-23

## 2021-05-21 RX ORDER — PHENYLEPHRINE HCL IN 0.9% NACL 0.4MG/10ML
SYRINGE (ML) INTRAVENOUS AS NEEDED
Status: DISCONTINUED | OUTPATIENT
Start: 2021-05-21 | End: 2021-05-21 | Stop reason: HOSPADM

## 2021-05-21 RX ORDER — SODIUM CHLORIDE, SODIUM LACTATE, POTASSIUM CHLORIDE, CALCIUM CHLORIDE 600; 310; 30; 20 MG/100ML; MG/100ML; MG/100ML; MG/100ML
INJECTION, SOLUTION INTRAVENOUS
Status: DISCONTINUED | OUTPATIENT
Start: 2021-05-21 | End: 2021-05-21 | Stop reason: HOSPADM

## 2021-05-21 RX ORDER — MIDAZOLAM HYDROCHLORIDE 1 MG/ML
0.5 INJECTION, SOLUTION INTRAMUSCULAR; INTRAVENOUS
Status: DISCONTINUED | OUTPATIENT
Start: 2021-05-21 | End: 2021-05-21 | Stop reason: HOSPADM

## 2021-05-21 RX ORDER — LIDOCAINE HYDROCHLORIDE 20 MG/ML
INJECTION, SOLUTION EPIDURAL; INFILTRATION; INTRACAUDAL; PERINEURAL AS NEEDED
Status: DISCONTINUED | OUTPATIENT
Start: 2021-05-21 | End: 2021-05-21 | Stop reason: HOSPADM

## 2021-05-21 RX ORDER — DEXAMETHASONE SODIUM PHOSPHATE 4 MG/ML
INJECTION, SOLUTION INTRA-ARTICULAR; INTRALESIONAL; INTRAMUSCULAR; INTRAVENOUS; SOFT TISSUE AS NEEDED
Status: DISCONTINUED | OUTPATIENT
Start: 2021-05-21 | End: 2021-05-21 | Stop reason: HOSPADM

## 2021-05-21 RX ORDER — OXYCODONE AND ACETAMINOPHEN 5; 325 MG/1; MG/1
2 TABLET ORAL
Status: DISCONTINUED | OUTPATIENT
Start: 2021-05-21 | End: 2021-06-03 | Stop reason: HOSPADM

## 2021-05-21 RX ORDER — HYDROMORPHONE HYDROCHLORIDE 1 MG/ML
1 INJECTION, SOLUTION INTRAMUSCULAR; INTRAVENOUS; SUBCUTANEOUS
Status: DISCONTINUED | OUTPATIENT
Start: 2021-05-21 | End: 2021-06-02

## 2021-05-21 RX ORDER — SODIUM CHLORIDE, SODIUM LACTATE, POTASSIUM CHLORIDE, CALCIUM CHLORIDE 600; 310; 30; 20 MG/100ML; MG/100ML; MG/100ML; MG/100ML
1000 INJECTION, SOLUTION INTRAVENOUS CONTINUOUS
Status: DISCONTINUED | OUTPATIENT
Start: 2021-05-21 | End: 2021-05-21 | Stop reason: HOSPADM

## 2021-05-21 RX ORDER — ONDANSETRON 2 MG/ML
INJECTION INTRAMUSCULAR; INTRAVENOUS AS NEEDED
Status: DISCONTINUED | OUTPATIENT
Start: 2021-05-21 | End: 2021-05-21 | Stop reason: HOSPADM

## 2021-05-21 RX ORDER — ENOXAPARIN SODIUM 100 MG/ML
40 INJECTION SUBCUTANEOUS EVERY 24 HOURS
Status: DISCONTINUED | OUTPATIENT
Start: 2021-05-22 | End: 2021-06-03 | Stop reason: HOSPADM

## 2021-05-21 RX ORDER — ACETAMINOPHEN 325 MG/1
650 TABLET ORAL ONCE
Status: DISCONTINUED | OUTPATIENT
Start: 2021-05-21 | End: 2021-05-21 | Stop reason: HOSPADM

## 2021-05-21 RX ORDER — HYDROMORPHONE HYDROCHLORIDE 1 MG/ML
0.2 INJECTION, SOLUTION INTRAMUSCULAR; INTRAVENOUS; SUBCUTANEOUS
Status: ACTIVE | OUTPATIENT
Start: 2021-05-21 | End: 2021-05-21

## 2021-05-21 RX ORDER — MORPHINE SULFATE 2 MG/ML
2 INJECTION, SOLUTION INTRAMUSCULAR; INTRAVENOUS
Status: DISCONTINUED | OUTPATIENT
Start: 2021-05-21 | End: 2021-05-21 | Stop reason: HOSPADM

## 2021-05-21 RX ORDER — SODIUM CHLORIDE, SODIUM LACTATE, POTASSIUM CHLORIDE, CALCIUM CHLORIDE 600; 310; 30; 20 MG/100ML; MG/100ML; MG/100ML; MG/100ML
100 INJECTION, SOLUTION INTRAVENOUS CONTINUOUS
Status: DISCONTINUED | OUTPATIENT
Start: 2021-05-21 | End: 2021-05-21 | Stop reason: HOSPADM

## 2021-05-21 RX ORDER — MIDAZOLAM HYDROCHLORIDE 1 MG/ML
INJECTION, SOLUTION INTRAMUSCULAR; INTRAVENOUS AS NEEDED
Status: DISCONTINUED | OUTPATIENT
Start: 2021-05-21 | End: 2021-05-21 | Stop reason: HOSPADM

## 2021-05-21 RX ORDER — LIDOCAINE HYDROCHLORIDE 10 MG/ML
0.1 INJECTION, SOLUTION EPIDURAL; INFILTRATION; INTRACAUDAL; PERINEURAL AS NEEDED
Status: DISCONTINUED | OUTPATIENT
Start: 2021-05-21 | End: 2021-05-21 | Stop reason: HOSPADM

## 2021-05-21 RX ORDER — NALOXONE HYDROCHLORIDE 0.4 MG/ML
0.4 INJECTION, SOLUTION INTRAMUSCULAR; INTRAVENOUS; SUBCUTANEOUS AS NEEDED
Status: DISCONTINUED | OUTPATIENT
Start: 2021-05-21 | End: 2021-06-03 | Stop reason: HOSPADM

## 2021-05-21 RX ORDER — ROPIVACAINE HYDROCHLORIDE 5 MG/ML
150 INJECTION, SOLUTION EPIDURAL; INFILTRATION; PERINEURAL AS NEEDED
Status: DISCONTINUED | OUTPATIENT
Start: 2021-05-21 | End: 2021-05-21 | Stop reason: HOSPADM

## 2021-05-21 RX ORDER — FENTANYL CITRATE 50 UG/ML
INJECTION, SOLUTION INTRAMUSCULAR; INTRAVENOUS AS NEEDED
Status: DISCONTINUED | OUTPATIENT
Start: 2021-05-21 | End: 2021-05-21 | Stop reason: HOSPADM

## 2021-05-21 RX ORDER — FENTANYL CITRATE 50 UG/ML
25 INJECTION, SOLUTION INTRAMUSCULAR; INTRAVENOUS
Status: DISCONTINUED | OUTPATIENT
Start: 2021-05-21 | End: 2021-05-21 | Stop reason: HOSPADM

## 2021-05-21 RX ORDER — SODIUM CHLORIDE 0.9 % (FLUSH) 0.9 %
5-40 SYRINGE (ML) INJECTION EVERY 8 HOURS
Status: DISCONTINUED | OUTPATIENT
Start: 2021-05-21 | End: 2021-06-03 | Stop reason: HOSPADM

## 2021-05-21 RX ORDER — DIPHENHYDRAMINE HYDROCHLORIDE 50 MG/ML
12.5 INJECTION, SOLUTION INTRAMUSCULAR; INTRAVENOUS AS NEEDED
Status: DISCONTINUED | OUTPATIENT
Start: 2021-05-21 | End: 2021-05-21 | Stop reason: HOSPADM

## 2021-05-21 RX ORDER — PROPOFOL 10 MG/ML
INJECTION, EMULSION INTRAVENOUS AS NEEDED
Status: DISCONTINUED | OUTPATIENT
Start: 2021-05-21 | End: 2021-05-21 | Stop reason: HOSPADM

## 2021-05-21 RX ORDER — OXYCODONE HYDROCHLORIDE 5 MG/1
5 TABLET ORAL AS NEEDED
Status: DISCONTINUED | OUTPATIENT
Start: 2021-05-21 | End: 2021-05-21 | Stop reason: HOSPADM

## 2021-05-21 RX ORDER — INSULIN GLARGINE 100 [IU]/ML
25 INJECTION, SOLUTION SUBCUTANEOUS
Status: DISCONTINUED | OUTPATIENT
Start: 2021-05-21 | End: 2021-06-03 | Stop reason: HOSPADM

## 2021-05-21 RX ORDER — SUCCINYLCHOLINE CHLORIDE 20 MG/ML
INJECTION INTRAMUSCULAR; INTRAVENOUS AS NEEDED
Status: DISCONTINUED | OUTPATIENT
Start: 2021-05-21 | End: 2021-05-21 | Stop reason: HOSPADM

## 2021-05-21 RX ORDER — ONDANSETRON 2 MG/ML
4 INJECTION INTRAMUSCULAR; INTRAVENOUS AS NEEDED
Status: DISCONTINUED | OUTPATIENT
Start: 2021-05-21 | End: 2021-05-21 | Stop reason: HOSPADM

## 2021-05-21 RX ORDER — EPHEDRINE SULFATE/0.9% NACL/PF 50 MG/5 ML
5 SYRINGE (ML) INTRAVENOUS AS NEEDED
Status: DISCONTINUED | OUTPATIENT
Start: 2021-05-21 | End: 2021-05-21 | Stop reason: HOSPADM

## 2021-05-21 RX ORDER — SODIUM CHLORIDE 9 MG/ML
25 INJECTION, SOLUTION INTRAVENOUS CONTINUOUS
Status: DISCONTINUED | OUTPATIENT
Start: 2021-05-21 | End: 2021-05-21 | Stop reason: HOSPADM

## 2021-05-21 RX ORDER — SODIUM CHLORIDE 0.9 % (FLUSH) 0.9 %
5-40 SYRINGE (ML) INJECTION AS NEEDED
Status: DISCONTINUED | OUTPATIENT
Start: 2021-05-21 | End: 2021-06-03 | Stop reason: HOSPADM

## 2021-05-21 RX ORDER — HYDROMORPHONE HYDROCHLORIDE 2 MG/ML
INJECTION, SOLUTION INTRAMUSCULAR; INTRAVENOUS; SUBCUTANEOUS AS NEEDED
Status: DISCONTINUED | OUTPATIENT
Start: 2021-05-21 | End: 2021-05-21 | Stop reason: HOSPADM

## 2021-05-21 RX ORDER — ROCURONIUM BROMIDE 10 MG/ML
INJECTION, SOLUTION INTRAVENOUS AS NEEDED
Status: DISCONTINUED | OUTPATIENT
Start: 2021-05-21 | End: 2021-05-21 | Stop reason: HOSPADM

## 2021-05-21 RX ORDER — DEXMEDETOMIDINE HYDROCHLORIDE 100 UG/ML
INJECTION, SOLUTION INTRAVENOUS AS NEEDED
Status: DISCONTINUED | OUTPATIENT
Start: 2021-05-21 | End: 2021-05-21 | Stop reason: HOSPADM

## 2021-05-21 RX ORDER — FENTANYL CITRATE 50 UG/ML
50 INJECTION, SOLUTION INTRAMUSCULAR; INTRAVENOUS AS NEEDED
Status: DISCONTINUED | OUTPATIENT
Start: 2021-05-21 | End: 2021-05-21 | Stop reason: HOSPADM

## 2021-05-21 RX ORDER — KETAMINE HYDROCHLORIDE 10 MG/ML
INJECTION, SOLUTION INTRAMUSCULAR; INTRAVENOUS AS NEEDED
Status: DISCONTINUED | OUTPATIENT
Start: 2021-05-21 | End: 2021-05-21 | Stop reason: HOSPADM

## 2021-05-21 RX ORDER — DIPHENHYDRAMINE HCL 25 MG
25 CAPSULE ORAL
Status: DISCONTINUED | OUTPATIENT
Start: 2021-05-21 | End: 2021-06-03 | Stop reason: HOSPADM

## 2021-05-21 RX ORDER — MIDAZOLAM HYDROCHLORIDE 1 MG/ML
1 INJECTION, SOLUTION INTRAMUSCULAR; INTRAVENOUS AS NEEDED
Status: DISCONTINUED | OUTPATIENT
Start: 2021-05-21 | End: 2021-05-21 | Stop reason: HOSPADM

## 2021-05-21 RX ADMIN — Medication 10 ML: at 21:13

## 2021-05-21 RX ADMIN — ONDANSETRON HYDROCHLORIDE 4 MG: 2 INJECTION, SOLUTION INTRAMUSCULAR; INTRAVENOUS at 10:25

## 2021-05-21 RX ADMIN — KETOROLAC TROMETHAMINE 30 MG: 30 INJECTION, SOLUTION INTRAMUSCULAR; INTRAVENOUS at 17:00

## 2021-05-21 RX ADMIN — Medication 80 MCG: at 11:14

## 2021-05-21 RX ADMIN — ROCURONIUM BROMIDE 10 MG: 10 SOLUTION INTRAVENOUS at 10:15

## 2021-05-21 RX ADMIN — DEXMEDETOMIDINE HYDROCHLORIDE 8 MCG: 100 INJECTION, SOLUTION, CONCENTRATE INTRAVENOUS at 11:46

## 2021-05-21 RX ADMIN — MIDAZOLAM HYDROCHLORIDE 3 MG: 1 INJECTION, SOLUTION INTRAMUSCULAR; INTRAVENOUS at 10:06

## 2021-05-21 RX ADMIN — INSULIN LISPRO 5 UNITS: 100 INJECTION, SOLUTION INTRAVENOUS; SUBCUTANEOUS at 07:34

## 2021-05-21 RX ADMIN — VANCOMYCIN HYDROCHLORIDE 1500 MG: 10 INJECTION, POWDER, LYOPHILIZED, FOR SOLUTION INTRAVENOUS at 13:14

## 2021-05-21 RX ADMIN — SUGAMMADEX 200 MG: 100 INJECTION, SOLUTION INTRAVENOUS at 11:40

## 2021-05-21 RX ADMIN — DOXYCYCLINE 100 MG: 100 INJECTION, POWDER, LYOPHILIZED, FOR SOLUTION INTRAVENOUS at 20:12

## 2021-05-21 RX ADMIN — Medication 10 ML: at 15:04

## 2021-05-21 RX ADMIN — INSULIN GLARGINE 25 UNITS: 100 INJECTION, SOLUTION SUBCUTANEOUS at 21:13

## 2021-05-21 RX ADMIN — Medication 80 MCG: at 11:22

## 2021-05-21 RX ADMIN — Medication 3 MG: at 21:47

## 2021-05-21 RX ADMIN — HYDROMORPHONE HYDROCHLORIDE 1 MG: 2 INJECTION, SOLUTION INTRAMUSCULAR; INTRAVENOUS; SUBCUTANEOUS at 11:48

## 2021-05-21 RX ADMIN — ALBUMIN (HUMAN) 25 G: 0.25 INJECTION, SOLUTION INTRAVENOUS at 01:21

## 2021-05-21 RX ADMIN — DOXYCYCLINE 100 MG: 100 INJECTION, POWDER, LYOPHILIZED, FOR SOLUTION INTRAVENOUS at 08:53

## 2021-05-21 RX ADMIN — DEXAMETHASONE SODIUM PHOSPHATE 8 MG: 4 INJECTION, SOLUTION INTRAMUSCULAR; INTRAVENOUS at 10:25

## 2021-05-21 RX ADMIN — VANCOMYCIN HYDROCHLORIDE 1500 MG: 10 INJECTION, POWDER, LYOPHILIZED, FOR SOLUTION INTRAVENOUS at 04:32

## 2021-05-21 RX ADMIN — Medication 80 MCG: at 10:50

## 2021-05-21 RX ADMIN — PIPERACILLIN AND TAZOBACTAM 3.38 G: 3; .375 INJECTION, POWDER, LYOPHILIZED, FOR SOLUTION INTRAVENOUS at 07:32

## 2021-05-21 RX ADMIN — MIDAZOLAM HYDROCHLORIDE 2 MG: 1 INJECTION, SOLUTION INTRAMUSCULAR; INTRAVENOUS at 10:10

## 2021-05-21 RX ADMIN — ROCURONIUM BROMIDE 10 MG: 10 SOLUTION INTRAVENOUS at 10:53

## 2021-05-21 RX ADMIN — INSULIN LISPRO 3 UNITS: 100 INJECTION, SOLUTION INTRAVENOUS; SUBCUTANEOUS at 16:55

## 2021-05-21 RX ADMIN — KETAMINE HYDROCHLORIDE 20 MG: 10 INJECTION, SOLUTION INTRAMUSCULAR; INTRAVENOUS at 11:23

## 2021-05-21 RX ADMIN — SUCCINYLCHOLINE CHLORIDE 200 MG: 20 INJECTION, SOLUTION INTRAMUSCULAR; INTRAVENOUS at 10:15

## 2021-05-21 RX ADMIN — PIPERACILLIN AND TAZOBACTAM 3.38 G: 3; .375 INJECTION, POWDER, LYOPHILIZED, FOR SOLUTION INTRAVENOUS at 16:42

## 2021-05-21 RX ADMIN — FENTANYL CITRATE 100 MCG: 50 INJECTION, SOLUTION INTRAMUSCULAR; INTRAVENOUS at 10:15

## 2021-05-21 RX ADMIN — Medication 10 ML: at 16:00

## 2021-05-21 RX ADMIN — VANCOMYCIN HYDROCHLORIDE 1500 MG: 10 INJECTION, POWDER, LYOPHILIZED, FOR SOLUTION INTRAVENOUS at 20:11

## 2021-05-21 RX ADMIN — ROCURONIUM BROMIDE 30 MG: 10 SOLUTION INTRAVENOUS at 10:21

## 2021-05-21 RX ADMIN — DIPHENHYDRAMINE HYDROCHLORIDE 25 MG: 25 CAPSULE ORAL at 01:20

## 2021-05-21 RX ADMIN — ALBUMIN (HUMAN) 25 G: 0.25 INJECTION, SOLUTION INTRAVENOUS at 07:35

## 2021-05-21 RX ADMIN — KETAMINE HYDROCHLORIDE 30 MG: 10 INJECTION, SOLUTION INTRAMUSCULAR; INTRAVENOUS at 10:36

## 2021-05-21 RX ADMIN — Medication 10 ML: at 07:32

## 2021-05-21 RX ADMIN — LIDOCAINE HYDROCHLORIDE 100 MG: 20 INJECTION, SOLUTION EPIDURAL; INFILTRATION; INTRACAUDAL; PERINEURAL at 10:15

## 2021-05-21 RX ADMIN — SODIUM CHLORIDE, POTASSIUM CHLORIDE, SODIUM LACTATE AND CALCIUM CHLORIDE: 600; 310; 30; 20 INJECTION, SOLUTION INTRAVENOUS at 10:06

## 2021-05-21 RX ADMIN — PROPOFOL 200 MG: 10 INJECTION, EMULSION INTRAVENOUS at 10:15

## 2021-05-21 RX ADMIN — Medication 80 MCG: at 11:01

## 2021-05-21 RX ADMIN — OXYCODONE HYDROCHLORIDE AND ACETAMINOPHEN 2 TABLET: 5; 325 TABLET ORAL at 17:00

## 2021-05-21 NOTE — BRIEF OP NOTE
Brief Postoperative Note    Patient: Navin Mullen  YOB: 1975  MRN: [id number]    Date of Procedure: 5/21/2021     Pre-Op Diagnosis: EMPYEMA    Post-Op Diagnosis: Same as preoperative diagnosis.       Procedure(s):  RIGHT VATS, DECORTICATION     Surgeon(s):  Nataliya Aranda MD    Surgical Assistant: Surg Asst-1: Sukh Menjivar    Anesthesia: General     Estimated Blood Loss (mL): 500     Complications: None    Specimens:   ID Type Source Tests Collected by Time Destination   1 : pleural peel Fresh Lung  Nataliya Aranda MD 5/21/2021 1129 Pathology   1 : Right pleural fluid Pleural Fluid Pleural Fluid CULTURE, ANAEROBIC, CELL COUNT AND DIFF, BODY FLUID, GRAM STAIN, CULTURE & SMEAR, AFB, CULTURE, FUNGUS Nataliya Aranda MD 5/21/2021 1038 Microbiology        Implants: * No implants in log *    Drains: * No LDAs found *    Findings: multipl epockets of raúl purulence    Condition: stable    Disposition: to pacu    Electronically Signed by Gunnar Mohan MD on 5/21/2021 at 11:51 AM

## 2021-05-21 NOTE — PROGRESS NOTES
[facility] [facility]  Hospitalist Group                                                                                          Hospitalist Progress Note  DO Chadd Riojas service: [phone] or [phone] from in house phone     2021 : Date of admission    Date of Service:  2021  NAME:  Feliz Muhammad  :  1975  MRN:  [id number]      Admission Summary:   Digna Burch a 39 y. o. male with past medical history of CAD, tobacco use disorder, non-insulin-dependent type 2 diabetes, dyslipidemia, hypertension who presented to [facility] with multiple complaints. He was admitted to [facility] on 21.  Patient endorsed a 1 month history of recurrent fevers, chills, night sweats, malaise.  He was a cigarette smoker, down from 2 packs prior to his MI last year. [facility]  had no recent travel or sick contacts. Sapna Liu was not up-to-date on Covid vaccination. Patient states that he has a chronic cough which really did not change much prior to presentation. He states that what changed over the past month was that he was having right sided chest pain. This was mostly on the right lateral thorax. Patient was found to have right complex loculated pleural effusion, possible lung abscess. Thoracic surgery was consulted took patient for lung decortication on 2021. Interval history / Subjective:      Patient is currently on broad-spectrum antibiotics with IV vancomycin, IV Zosyn. He continues to spike fevers at times. Patient is also mildly tachycardic persistently. Patient is off the floor in the operating room and then PACU today. Patient has been taken off airborne respiratory isolation by Infectious Disease today as TB has been ruled out with 2 negative acid fast bacilli smears. Assessment & Plan:     1. Right empyema. Patient is currently on broad spectrum antibiotics with IV vancomycin, IV Zosyn, doxycycline.  Decortication of the right lung via VATS by thoracic surgery on May 21, 2021.   2. Coronary artery disease. Patient had stents x 2 to RCA in April, 2020. In the setting of noncompliance to dual antiplatelet therapy, he developed instent stenosis,  he developed instent stenosis, that was treated with PCI and and furthermore,  patient  had stent placed to the LAD on September 29, 2020.    3. Severe hypoalbuminemia. Albumin  25 g IV Q6 hours X 8 doses started on 5/20/2021. 4.  Persistent tachycardia. Feel that patient is behind in fluids. There was some concern because his sodium level went down to 129, and he was placed on fluid restriction to 1.5 L for possible SIADH. However, I feel that currently, the benefits of fluids outweigh the risks of mild hyponatremia secondary to SIADH. Will give fluids with normal saline infusion of 100 mL per hour. Patient has been taken off fluid restriction. 5.  Insulin-dependent diabetes mellitus. Increase Lantus to 25 units nightly from 15 units nightly, adjunctive sliding scale insulin. s 15 units nightly, adjunctive Sliding scale insulin. 6. Mild hyponatremia. Continue to monitor.       Code status: full      Care Plan discussed with: Nurse  Anticipated Disposition: Home w/Family  Anticipated Discharge: Greater than 48 hours     Hospital Problems  Never Reviewed        Codes Class Noted POA    Lung abscess ([facility] ICD-10-CM: J85.2  ICD-9-CM: 513.0  5/18/2021 Unknown              Current Facility-Administered Medications:     diphenhydrAMINE (BENADRYL) capsule 25 mg, 25 mg, Oral, Q6H PRN, Jo Pietrot, NP, 25 mg at 05/21/21 0120    insulin glargine (LANTUS) injection 25 Units, 25 Units, SubCUTAneous, QHS, Lm Centeno H, DO    lactated Ringers infusion 100 mL, 100 mL, IntraVENous, CONTINUOUS, Sidney Miles MD    0.9% sodium chloride infusion, 25 mL/hr, IntraVENous, CONTINUOUS, Sidney Miles MD    oxyCODONE IR (ROXICODONE) tablet 5 mg, 5 mg, Oral, PRN, Sidney Miles MD  [facility] fentaNYL citrate (PF) injection 25 mcg, 25 mcg, IntraVENous, Multiple, Chiquita Nicholas MD    morphine injection 2 mg, 2 mg, IntraVENous, Multiple, Cathy Martins MD    ondansetron [facility]) injection 4 mg, 4 mg, IntraVENous, PRN, Vianey Milian MD    midazolam (VERSED) injection 0.5 mg, 0.5 mg, IntraVENous, Q5MIN PRN, Vianey Milian MD    diphenhydrAMINE (BENADRYL) injection 12.5 mg, 12.5 mg, IntraVENous, PRN, Vianey Milian MD    ePHEDrine in NS (PF) (MISTOLE) 10 mg/mL in NS syringe 5 mg, 5 mg, IntraVENous, PRN, Vianey Milian MD    albumin human 25% (BUMINATE) solution 25 g, 25 g, IntraVENous, Q6H, Jeanmarie Alberta H, DO, 25 g at 05/21/21 0735    [COMPLETED] vancomycin (VANCOCIN) 2000 mg in  ml infusion, 2,000 mg, IntraVENous, ONCE, Stopped at 05/20/21 0050 **FOLLOWED BY** vancomycin (VANCOCIN) 1500 mg in  ml infusion, 1,500 mg, IntraVENous, Q8H, Belen Hernandez NP, Last Rate: 250 mL/hr at 05/21/21 1314, 1,500 mg at 05/21/21 1314    Vancomycin - Pharmacy to Dose, , Other, Rx Dosing/Monitoring, Belen Hernandez NP    insulin lispro (HUMALOG) injection, , SubCUTAneous, TIDAC, Herlinda Tamez MD, 5 Units at 05/21/21 0734    glucose chewable tablet 16 g, 4 Tablet, Oral, PRN, Herlinda Tamez MD    dextrose (D50W) injection syrg 12.5-25 g, 12.5-25 g, IntraVENous, PRN, Herlinda Tamez MD    glucagon (GLUCAGEN) injection 1 mg, 1 mg, IntraMUSCular, PRN, Herlinda Tamez MD    piperacillin-tazobactam (ZOSYN) 3.375 g in 0.9% sodium chloride (MBP/ADV) 100 mL MBP, 3.375 g, IntraVENous, Q8H, Noah Pugh MD, Last Rate: 25 mL/hr at 05/21/21 0732, 3.375 g at 05/21/21 0732    [Held by provider] metoprolol succinate (TOPROL-XL) XL tablet 50 mg, 50 mg, Oral, DAILY, Noah Pugh MD    atorvastatin (LIPITOR) tablet 80 mg, 80 mg, Oral, DAILY, Noah Pugh MD, 80 mg at 05/20/21 0938    nicotine (NICODERM CQ) 21 mg/24 hr patch 1 Patch, 1 Patch, TransDERmal, DAILY, Noah Pugh MD    sodium chloride (NS) flush 5-40 mL, 5-40 mL, IntraVENous, Q8H, Noah Pugh MD, 10 mL at 05/21/21 0732    sodium chloride (NS) flush 5-40 mL, 5-40 mL, IntraVENous, PRN, Noah Pugh MD    acetaminophen (TYLENOL) tablet 650 mg, 650 mg, Oral, Q6H PRN, 650 mg at 05/20/21 1742 **OR** acetaminophen (TYLENOL) suppository 650 mg, 650 mg, Rectal, Q6H PRN, Noah Pugh MD    polyethylene glycol (MIRALAX) packet 17 g, 17 g, Oral, DAILY PRN, Noah Pugh MD    promethazine (PHENERGAN) tablet 12.5 mg, 12.5 mg, Oral, Q6H PRN **OR** ondansetron (ZOFRAN) injection 4 mg, 4 mg, IntraVENous, Q6H PRN, Noah Pugh MD    0.9% sodium chloride infusion, 100 mL/hr, IntraVENous, CONTINUOUS, Sharlot Proud H, DO, Last Rate: 100 mL/hr at 05/20/21 1743, 100 mL/hr at 05/20/21 1743    doxycycline (VIBRAMYCIN) 100 mg in 0.9% sodium chloride (MBP/ADV) 100 mL MBP, 100 mg, IntraVENous, Q12H, Belen Hernandez, NP, Last Rate: 100 mL/hr at 05/21/21 0853, 100 mg at 05/21/21 0853    melatonin tablet 3 mg, 3 mg, Oral, QHS PRN, Nikita Angelo MD, 3 mg at 05/20/21 2207    Review of Systems:   A comprehensive review of systems was negative except for that written in the HPI.        Vital Signs:     Patient Vitals for the past 24 hrs:   BP Temp Pulse Resp SpO2 Oxygen therapy   05/21/21 1543 122/80 98.1 °F (36.7 °C) (!) 112 21 100 % 2 L/minute   05/21/21 1509   (!) 114 18 100 %    05/21/21 1504   (!) 115 17 100 %    05/21/21 1500 107/73  (!) 111 17 100 %    05/21/21 1445 106/74 97.9 °F (36.6 °C) (!) 114 18 100 %    05/21/21 1430 105/72  (!) 115 18 100 %    05/21/21 1415 103/70  (!) 118 20 100 %    05/21/21 1400 101/64  (!) 114 18 100 %    05/21/21 1345 101/69  (!) 116 11 100 %    05/21/21 1330 104/70  (!) 117 17 100 % 3 L/minute   05/21/21 1320   (!) 123 25 97 % 4 L/minute   05/21/21 1315 104/69  (!) 117 17 100 %    05/21/21 1300 103/66  (!) 118 20 97 %    05/21/21 1235 (!) 83/56  (!) 115 21 93 %    05/21/21 1230 96/62  (!) 120 22 95 % 4 L/minute   05/21/21 1225 (!) 126/98  (!) 120 20 91 %    05/21/21 1220 (!) 123/99  (!) 116 21 92 %    05/21/21 1218 (!) 129/115 98.5 °F (36.9 °C) (!) 116 20 92 % 6 L/minute   05/21/21 0902 115/71 99.3 °F (37.4 °C) (!) 112 19 95 % 2 L/minute   05/21/21 0400 113/70 98.4 °F (36.9 °C) (!) 111 16 91 %          Intake/Output Summary (Last 24 hours) at 5/21/2021 1359  Last data filed at 5/21/2021 1320  Gross per 24 hour   Intake 800 ml   Output 3495 ml   Net -2695 ml        Physical Examination:            Data Review:       Labs:     Recent Labs     05/21/21 0443 05/19/21  1101   WBC 12.8* 15.1*   HGB 8.8* 9.4*   HCT 28.6* 30.4*   * 545*     Recent Labs     05/21/21 0443 05/20/21 0353 05/19/21  0523    135* 134*   K 3.4* 3.4* 3.2*    98 96*   CO2 28 26 30   BUN 5* 4* 6   CREA 0.48* 0.46* 0.50*   * 272* 264*   CA 8.6 8.5 8.0*   PHOS  --  2.9 2.2*     Recent Labs     05/21/21 0443 05/20/21 0353 05/19/21  0523   ALT 28  --   --      --   --    TBILI 0.5  --   --    TP 7.6  --   --    ALB 2.7* 2.0* 1.9*   GLOB 4.9*  --   --        ______________________________________________________________________  EXPECTED LENGTH OF STAY: 4d 9h  ACTUAL LENGTH OF STAY:          [address] DO

## 2021-05-21 NOTE — PROGRESS NOTES
1255: R upper flank trocar site with dermabond with scant sanguinous ooze. Cleansed and dressing applied. 1500: TRANSFER - OUT REPORT:    Verbal report given to Surekha Aceves RN(name) on Nyoka Schwab  being transferred to (unit) for routine post - op       Report consisted of patients Situation, Background, Assessment and   Recommendations(SBAR). Time Pre op antibiotic given:scheduled  Anesthesia Stop time: 1218  Graham Present on Transfer to floor:no  Order for Graham on Chart:no  Discharge Prescriptions with Chart:no    Information from the following report(s) SBAR, Kardex, OR Summary, Intake/Output and MAR was reviewed with the receiving nurse. Opportunity for questions and clarification was provided. Is the patient on 02? YES       L/Min 2        Is the patient on a monitor? YES    Is the nurse transporting with the patient? YES    Surgical Waiting Area notified of patient's transfer from PACU? YES      The following personal items collected during your admission accompanied patient upon transfer:   Dental Appliance: Dental Appliances: None  Vision:    Hearing Aid:    Jewelry:    Clothing: Clothing: Slippers, Shorts, Shirt, Undergarments, With patient, At bedside  Other Valuables: Other Valuables: Eyeglasses  Valuables sent to safe:      Pt endorses he will call his wife Katelin once in the room.

## 2021-05-21 NOTE — ANESTHESIA POSTPROCEDURE EVALUATION
Post-Anesthesia Evaluation and Assessment    Patient: Navin Mullen MRN: [id number]  SSN: [id number]    YOB: 1975  Age: 39 y.o. Sex: male      I have evaluated the patient and they are stable and ready for discharge from the PACU. Cardiovascular Function/Vital Signs  Visit Vitals  /69   Pulse (!) 123   Temp 36.9 °C (98.5 °F)   Resp 25   Ht 5' 6\" (1.676 m)   Wt 81.6 kg (180 lb)   SpO2 97%   BMI 29.05 kg/m²       Patient is status post General anesthesia for Procedure(s):  RIGHT VATS, DECORTICATION . Nausea/Vomiting: None    Postoperative hydration reviewed and adequate. Pain:  Pain Scale 1: Numeric (0 - 10) (05/21/21 1320)  Pain Intensity 1: 0 (05/21/21 1320)   Managed    Neurological Status:   Neuro (WDL): Exceptions to WDL (05/21/21 1320)  Neuro  Neurologic State: Drowsy; Eyes open to voice (05/21/21 1320)  LUE Motor Response: Purposeful (05/21/21 1320)  LLE Motor Response: Purposeful (05/21/21 1320)  RUE Motor Response: Purposeful (05/21/21 1320)  RLE Motor Response: Purposeful (05/21/21 1320)   At baseline    Mental Status, Level of Consciousness: Alert and  oriented to person, place, and time    Pulmonary Status:   O2 Device: Nasal cannula (05/21/21 1320)   Adequate oxygenation and airway patent    Complications related to anesthesia: None    Post-anesthesia assessment completed. No concerns    Signed By: Corina Oakes MD     May 21, 2021              Procedure(s):  RIGHT VATS, DECORTICATION . general    <BSHSIANPOST>    INITIAL Post-op Vital signs:   Vitals Value Taken Time   /69 05/21/21 1315   Temp 36.9 °C (98.5 °F) 05/21/21 1218   Pulse 117 05/21/21 1328   Resp 19 05/21/21 1328   SpO2 100 % 05/21/21 1328   Vitals shown include unvalidated device data.

## 2021-05-21 NOTE — PROGRESS NOTES
ID Progress Note  2021    Subjective:   C/o SOB    Review of Systems:            Symptom Y/N Comments   Symptom Y/N Comments   Fever/Chills  n     Chest Pain n       Poor Appetite       Edema        Cough  y     Abdominal Pain        Sputum  y     Joint Pain        SOB/WARE  y     Pruritis/Rash        Nausea/vomit  n     Tolerating PT/OT        Diarrhea  n     Tolerating Diet        Constipation  n     Other           Could NOT obtain due to:       Objective:     Vitals:   Visit Vitals  /70 (BP 1 Location: Left upper arm, BP Patient Position: Sitting)   Pulse (!) 111   Temp 98.4 °F (36.9 °C)   Resp 16   Ht 5' 6\" (1.676 m) Comment: Per patient report 2021   Wt 81.6 kg (180 lb)   SpO2 91%   BMI 29.05 kg/m²        Tmax:  Temp (24hrs), Av.1 °F (37.3 °C), Min:98.2 °F (36.8 °C), Max:100.4 °F (38 °C)      PHYSICAL EXAM:  General: Chronically ill appearing, WD, WN. Alert, cooperative, no acute distress    EENT:  EOMI. Anicteric sclerae. MMM  Resp:  Coarse breath sounds in apex with diminished breath sounds at bases, no wheezing or rales. No accessory muscle use  CV:  Regular  rhythm,  No edema  GI:  Soft, Non distended, Non tender. +Bowel sounds  Neurologic:  Alert and oriented X 3, normal speech,   Psych:   Good insight. Not anxious nor agitated  Skin:  No rashes.   No jaundice    Labs:   Lab Results   Component Value Date/Time    WBC 12.8 (H) 2021 04:43 AM    HGB 8.8 (L) 2021 04:43 AM    HCT 28.6 (L) 2021 04:43 AM    PLATELET 045 (H)  04:43 AM    MCV 82.9 2021 04:43 AM     Lab Results   Component Value Date/Time    Sodium 137 2021 04:43 AM    Potassium 3.4 (L) 2021 04:43 AM    Chloride 101 2021 04:43 AM    CO2 28 2021 04:43 AM    Anion gap 8 2021 04:43 AM    Glucose 290 (H) 2021 04:43 AM    BUN 5 (L) 2021 04:43 AM    Creatinine 0.48 (L) 2021 04:43 AM    BUN/Creatinine ratio 10 (L) 2021 04:43 AM    GFR est AA >60 05/21/2021 04:43 AM    GFR est non-AA >60 05/21/2021 04:43 AM    Calcium 8.6 05/21/2021 04:43 AM    Bilirubin, total 0.5 05/21/2021 04:43 AM    Alk. phosphatase 114 05/21/2021 04:43 AM    Protein, total 7.6 05/21/2021 04:43 AM    Albumin 2.7 (L) 05/21/2021 04:43 AM    Globulin 4.9 (H) 05/21/2021 04:43 AM    A-G Ratio 0.6 (L) 05/21/2021 04:43 AM    ALT (SGPT) 28 05/21/2021 04:43 AM         Assessment and Plan   CAP   right lung abscess/right empyema  leukocytosis  - COVID 19; rapid & PCR negative    WBC 15.1->12.8, T-max 100.4     Blood cx (5/18) no growth so far    Resp cx (5/18) Beta hemolytic group A strep    Fungal blood cx (5/19) pending    Urin x (5/18) no growth    procal 1.93    O2 @ 2L via n/c    HIV (-)   rule out TB; AFB (-) x2 , one more AFB result pending    quantiferon, aspergillus galact Ag pending   respiratory panel (-)   legionella, strep pneumo pending   mycoplasma IgG (+) with (-) IgM; indicates previous exposure   CT of chest (5/18):  1. Multiloculated right empyema. 2. Right lower lobe pneumonia. 3. Right upper lobe pneumonia with possible small lung abscess. 4. Emphysema. 5. Coronary artery disease.        Appreciate CTS team's input; scheduled to have Right VATS 5/21     continue with IV zosyn, doxycyline, and vancomycin   adjust ABX prn   fever work up if temp >= 100.4     Poorly controlled Type II DM  - A1C 13.2, management per primary team; continue with insulin therapy     CAD  - continue with ASA and statin    Above plan d/w and agreed by Dr. Eber Herrera, NP

## 2021-05-21 NOTE — PERIOP NOTES
TRANSFER - IN REPORT:    Verbal report received from [address] [place] [address] RN(name) on Metta Gearing  being received from 2N(unit) for ordered procedure      Report consisted of patients Situation, Background, Assessment and   Recommendations(SBAR). Information from the following report(s) SBAR was reviewed with the receiving nurse. Opportunity for questions and clarification was provided. Assessment completed upon patients arrival to unit and care assumed.      Patient is on airborne isolation for TB ruleout; they will go directly to the OR

## 2021-05-21 NOTE — PROGRESS NOTES
Bedside shift change report given to cachorro (oncoming nurse) by Uriah Darnell (offgoing nurse). Report included the following information SBAR, Kardex and Intake/Output.

## 2021-05-21 NOTE — PROGRESS NOTES
Occupational Therapy  Chart reviewed; off unit for cardiac procedure; will retry later as able for OT eval. Geri Bates OTR/L

## 2021-05-21 NOTE — ROUTINE PROCESS
Patient: Ruthann Wright MRN: [id number]  SSN: [id number] YOB: 1975  Age: 39 y.o. Sex: male Patient is status post Procedure(s): RIGHT VATS, DECORTICATION . Surgeon(s) and Role: Katie Green MD - Primary Local/Dose/Irrigation:  Local 18 ml Peripheral IV 05/19/21 Left;Posterior Hand (Active) Site Assessment Clean, dry, & intact 05/21/21 0902 Phlebitis Assessment 0 05/21/21 0902 Infiltration Assessment 0 05/21/21 0902 Dressing Status Clean, dry, & intact 05/21/21 0902 Dressing Type Tape;Transparent 05/21/21 0902 Hub Color/Line Status Blue; Infusing 05/20/21 2207 Action Taken Open ports on tubing capped 05/21/21 0902 Alcohol Cap Used Yes 05/21/21 0902 Peripheral IV 05/19/21 Left;Upper Forearm (Active) Site Assessment Clean, dry, & intact 05/20/21 2207 Phlebitis Assessment 0 05/20/21 2207 Infiltration Assessment 0 05/20/21 2207 Dressing Status Clean, dry, & intact 05/20/21 2207 Dressing Type Transparent 05/20/21 2207 Hub Color/Line Status Blue; Infusing 05/20/21 2207 Action Taken Open ports on tubing capped 05/19/21 2216 Alcohol Cap Used Yes 05/19/21 2216 Peripheral IV 05/21/21 Right Hand (Active) Site Assessment Clean, dry, & intact 05/21/21 0902 Phlebitis Assessment 0 05/21/21 0902 Infiltration Assessment 0 05/21/21 0902 Dressing Status Clean, dry, & intact 05/21/21 0902 Dressing Type Tape;Transparent 05/21/21 0902 Action Taken Open ports on tubing capped 05/21/21 0902 Alcohol Cap Used Yes 05/21/21 0902 Airway - Endotracheal Tube 05/21/21 Oral (Active) Dressing/Packing:  Incision 05/21/21 Chest Right-Dressing/Treatment: Gauze dressing/dressing sponge;Tape/Soft cloth adhesive tape; Other (Comment) (drain sponges) (05/21/21 1100) Splint/Cast:  ] Other:  Scds; no adame for case; 2 chest tubes;

## 2021-05-21 NOTE — PROGRESS NOTES
Verbal shift change report given to Jaylen Dia RN (oncoming nurse) by Shannan Rinne RN (offgoing nurse). Report included the following information SBAR, Kardex and MAR.

## 2021-05-21 NOTE — ANESTHESIA PREPROCEDURE EVALUATION
Relevant Problems   No relevant active problems       Anesthetic History   No history of anesthetic complications            Review of Systems / Medical History  Patient summary reviewed, nursing notes reviewed and pertinent labs reviewed    Pulmonary                Comments: lung abscess   Neuro/Psych   Within defined limits           Cardiovascular    Hypertension          CAD and cardiac stents         GI/Hepatic/Renal  Within defined limits              Endo/Other  Within defined limits           Other Findings   Comments: Lung abscess  R/o TB         Physical Exam    Airway  Mallampati: II  TM Distance: > 6 cm  Neck ROM: normal range of motion   Mouth opening: Normal     Cardiovascular  Regular rate and rhythm,  S1 and S2 normal,  no murmur, click, rub, or gallop             Dental  No notable dental hx       Pulmonary  Breath sounds clear to auscultation               Abdominal  GI exam deferred       Other Findings            Anesthetic Plan    ASA: 3  Anesthesia type: general          Induction: Intravenous  Anesthetic plan and risks discussed with: Patient

## 2021-05-22 ENCOUNTER — APPOINTMENT (OUTPATIENT)
Dept: GENERAL RADIOLOGY | Age: 46
DRG: 121 | End: 2021-05-22
Attending: THORACIC SURGERY (CARDIOTHORACIC VASCULAR SURGERY)
Payer: COMMERCIAL

## 2021-05-22 LAB
ALBUMIN SERPL-MCNC: 2.5 G/DL (ref 3.5–5)
ALBUMIN/GLOB SERPL: 0.5 {RATIO} (ref 1.1–2.2)
ALP SERPL-CCNC: 109 U/L (ref 45–117)
ALT SERPL-CCNC: 34 U/L (ref 12–78)
ANION GAP SERPL CALC-SCNC: 6 MMOL/L (ref 5–15)
AST SERPL-CCNC: 38 U/L (ref 15–37)
BILIRUB SERPL-MCNC: 0.4 MG/DL (ref 0.2–1)
BUN SERPL-MCNC: 10 MG/DL (ref 6–20)
BUN/CREAT SERPL: 13 (ref 12–20)
CALCIUM SERPL-MCNC: 8.6 MG/DL (ref 8.5–10.1)
CHLORIDE SERPL-SCNC: 99 MMOL/L (ref 97–108)
CO2 SERPL-SCNC: 30 MMOL/L (ref 21–32)
CREAT SERPL-MCNC: 0.8 MG/DL (ref 0.7–1.3)
GLOBULIN SER CALC-MCNC: 5.3 G/DL (ref 2–4)
GLUCOSE BLD STRIP.AUTO-MCNC: 274 MG/DL (ref 65–117)
GLUCOSE BLD STRIP.AUTO-MCNC: 310 MG/DL (ref 65–117)
GLUCOSE BLD STRIP.AUTO-MCNC: 326 MG/DL (ref 65–117)
GLUCOSE BLD STRIP.AUTO-MCNC: 340 MG/DL (ref 65–117)
GLUCOSE SERPL-MCNC: 434 MG/DL (ref 65–100)
POTASSIUM SERPL-SCNC: 4 MMOL/L (ref 3.5–5.1)
PROT SERPL-MCNC: 7.8 G/DL (ref 6.4–8.2)
SERVICE CMNT-IMP: ABNORMAL
SODIUM SERPL-SCNC: 135 MMOL/L (ref 136–145)

## 2021-05-22 PROCEDURE — 77010033678 HC OXYGEN DAILY

## 2021-05-22 PROCEDURE — 77030027138 HC INCENT SPIROMETER -A

## 2021-05-22 PROCEDURE — 74011250637 HC RX REV CODE- 250/637: Performed by: THORACIC SURGERY (CARDIOTHORACIC VASCULAR SURGERY)

## 2021-05-22 PROCEDURE — 74011000258 HC RX REV CODE- 258: Performed by: THORACIC SURGERY (CARDIOTHORACIC VASCULAR SURGERY)

## 2021-05-22 PROCEDURE — 74011636637 HC RX REV CODE- 636/637: Performed by: THORACIC SURGERY (CARDIOTHORACIC VASCULAR SURGERY)

## 2021-05-22 PROCEDURE — 99231 SBSQ HOSP IP/OBS SF/LOW 25: CPT | Performed by: THORACIC SURGERY (CARDIOTHORACIC VASCULAR SURGERY)

## 2021-05-22 PROCEDURE — 74011250636 HC RX REV CODE- 250/636: Performed by: THORACIC SURGERY (CARDIOTHORACIC VASCULAR SURGERY)

## 2021-05-22 PROCEDURE — 71045 X-RAY EXAM CHEST 1 VIEW: CPT

## 2021-05-22 PROCEDURE — 82962 GLUCOSE BLOOD TEST: CPT

## 2021-05-22 PROCEDURE — 94760 N-INVAS EAR/PLS OXIMETRY 1: CPT

## 2021-05-22 PROCEDURE — 80053 COMPREHEN METABOLIC PANEL: CPT

## 2021-05-22 PROCEDURE — 65660000000 HC RM CCU STEPDOWN

## 2021-05-22 PROCEDURE — 36415 COLL VENOUS BLD VENIPUNCTURE: CPT

## 2021-05-22 RX ADMIN — Medication 10 ML: at 21:47

## 2021-05-22 RX ADMIN — KETOROLAC TROMETHAMINE 30 MG: 30 INJECTION, SOLUTION INTRAMUSCULAR; INTRAVENOUS at 00:53

## 2021-05-22 RX ADMIN — PIPERACILLIN AND TAZOBACTAM 3.38 G: 3; .375 INJECTION, POWDER, LYOPHILIZED, FOR SOLUTION INTRAVENOUS at 08:38

## 2021-05-22 RX ADMIN — VANCOMYCIN HYDROCHLORIDE 1500 MG: 10 INJECTION, POWDER, LYOPHILIZED, FOR SOLUTION INTRAVENOUS at 04:03

## 2021-05-22 RX ADMIN — Medication 10 ML: at 06:27

## 2021-05-22 RX ADMIN — PIPERACILLIN AND TAZOBACTAM 3.38 G: 3; .375 INJECTION, POWDER, LYOPHILIZED, FOR SOLUTION INTRAVENOUS at 00:54

## 2021-05-22 RX ADMIN — KETOROLAC TROMETHAMINE 30 MG: 30 INJECTION, SOLUTION INTRAMUSCULAR; INTRAVENOUS at 23:49

## 2021-05-22 RX ADMIN — INSULIN LISPRO 7 UNITS: 100 INJECTION, SOLUTION INTRAVENOUS; SUBCUTANEOUS at 11:24

## 2021-05-22 RX ADMIN — INSULIN LISPRO 7 UNITS: 100 INJECTION, SOLUTION INTRAVENOUS; SUBCUTANEOUS at 16:22

## 2021-05-22 RX ADMIN — Medication 3 MG: at 21:42

## 2021-05-22 RX ADMIN — INSULIN LISPRO 7 UNITS: 100 INJECTION, SOLUTION INTRAVENOUS; SUBCUTANEOUS at 07:09

## 2021-05-22 RX ADMIN — PIPERACILLIN AND TAZOBACTAM 3.38 G: 3; .375 INJECTION, POWDER, LYOPHILIZED, FOR SOLUTION INTRAVENOUS at 23:49

## 2021-05-22 RX ADMIN — KETOROLAC TROMETHAMINE 30 MG: 30 INJECTION, SOLUTION INTRAMUSCULAR; INTRAVENOUS at 12:09

## 2021-05-22 RX ADMIN — INSULIN GLARGINE 25 UNITS: 100 INJECTION, SOLUTION SUBCUTANEOUS at 21:41

## 2021-05-22 RX ADMIN — ENOXAPARIN SODIUM 40 MG: 40 INJECTION SUBCUTANEOUS at 10:48

## 2021-05-22 RX ADMIN — PIPERACILLIN AND TAZOBACTAM 3.38 G: 3; .375 INJECTION, POWDER, LYOPHILIZED, FOR SOLUTION INTRAVENOUS at 16:22

## 2021-05-22 RX ADMIN — VANCOMYCIN HYDROCHLORIDE 1500 MG: 10 INJECTION, POWDER, LYOPHILIZED, FOR SOLUTION INTRAVENOUS at 20:39

## 2021-05-22 RX ADMIN — HYDROMORPHONE HYDROCHLORIDE 1 MG: 1 INJECTION, SOLUTION INTRAMUSCULAR; INTRAVENOUS; SUBCUTANEOUS at 20:29

## 2021-05-22 RX ADMIN — DOXYCYCLINE 100 MG: 100 INJECTION, POWDER, LYOPHILIZED, FOR SOLUTION INTRAVENOUS at 08:38

## 2021-05-22 RX ADMIN — DOXYCYCLINE 100 MG: 100 INJECTION, POWDER, LYOPHILIZED, FOR SOLUTION INTRAVENOUS at 20:40

## 2021-05-22 RX ADMIN — KETOROLAC TROMETHAMINE 30 MG: 30 INJECTION, SOLUTION INTRAMUSCULAR; INTRAVENOUS at 18:26

## 2021-05-22 RX ADMIN — KETOROLAC TROMETHAMINE 30 MG: 30 INJECTION, SOLUTION INTRAMUSCULAR; INTRAVENOUS at 06:27

## 2021-05-22 RX ADMIN — ATORVASTATIN CALCIUM 80 MG: 40 TABLET, FILM COATED ORAL at 08:37

## 2021-05-22 RX ADMIN — VANCOMYCIN HYDROCHLORIDE 1500 MG: 10 INJECTION, POWDER, LYOPHILIZED, FOR SOLUTION INTRAVENOUS at 12:09

## 2021-05-22 NOTE — ROUTINE PROCESS
Bedside shift change report given to Moreno Garvey (oncoming nurse) by Clearnce Letters (offgoing nurse). Report included the following information SBAR, Kardex, MAR and Cardiac Rhythm ST.

## 2021-05-22 NOTE — OP NOTES
[address]   OPERATIVE REPORT    Name:  Fabby Rebolledo  MR#:  [id number]  :  1975  ACCOUNT #:  [de-identified]  DATE OF SERVICE:  2021      CLINICAL SERVICE:  Thoracic Surgery. ATTENDING SURGEON:  Bautista Weri MD    OPERATIONS PERFORMED:  1. Right video-assisted thoracoscopy. 2.  Full pulmonary decortication. PREOPERATIVE DIAGNOSIS:  Empyema. POSTOPERATIVE DIAGNOSIS:  Empyema. FIRST ASSISTANT:  Darius Ramirez    SPECIMENS SENT:  1.  Multiple specimens of right pleural fluid was sent to microbiology. 2.  Right pleural peel was sent to anatomic pathology. DRAINS AND TUBES:  Two 32-Occitan chest tubes, one straight, one right angle, were left within the right hemithorax. ANESTHESIA:  General with double-lumen endotracheal intubation. ESTIMATED BLOOD LOSS:  For this case was 350 mL. INDICATIONS FOR PROCEDURE:  The patient is a 70-year-old gentleman transferred from an outside hospital for fevers, malaise, and a complex right empyema. He was seen by infectious disease and thoracic surgery. Decision was made to proceed to the operating room for full pulmonary decortication. PROCEDURE IN DETAIL:  After informed consent was obtained and placed on the chart, the patient was taken to the operating room and placed supine on the table. General anesthesia with double-lumen endotracheal intubation was induced without complication. Preoperative antibiotics were administered. The patient was placed in the left lateral decubitus position with right side up. The patient's right chest was prepped and draped in sterile fashion. Time-out was performed. Through the eighth intercostal space along the posterior axillary line, a 15-mm Thoracoport was placed. It was very difficult entry into the inferior right chest.  Everything was inflamed and the right lower lobe was essentially fused to the parietal pleura.   After great difficulty, we were able to gain some traction but did not have good exposure. A working port was then placed anteriorly in the fourth intercostal space. The lung field was not as inflamed and then a plane was easily created superiorly. We were then able to slowly free the lung from the parietal pleura working in an anterior to posterior and superior to inferior fashion. We encountered multiple pockets of raúl purulent material which was thick, creamy white to yellow in color. Multiple samples were obtained and sent to microbiology for culture. We ensured that posteriorly we broke up the pocket of purulence that appeared to correlate with the posterior abscess seen on CT scan. In working from superior to inferior, we were able to help free up the right lower lobe which was so densely adherent. The chest was thoroughly irrigated with multiple liters of saline. Hemostasis was ensured. We decorticated the lower lobe. There was some injury and some necrotic areas of the lower lobe. We were unable to completely free the lower lobe from parts of the diaphragm. We then thoroughly irrigated the chest a second time. Representative samples of pleural peel from the decortication was sent to anatomic pathology. A 32-Welsh straight chest tube was placed posteriorly and a 32-Welsh right angle chest tube was placed hiding along the diaphragm. The lung was re-inflated under direct visualization. The incisions were then closed in multilayered fashion and covered with Dermabond. Approximately 60 mL of 1% lidocaine mixed with 0.25% Marcaine was used as local anesthetic to perform intercostal nerve blocks. The patient was then reversed from general anesthesia, extubated, and taken to PACU in stable condition. All surgical counts were correct x2 at the end of the case. There were no immediate complications identified during this case.     Dr. Michelle Kong was present and scrubbed throughout the entire procedure.         Madyson Dodson MD      [id number]  D:  05/21/2021 12:51  T:  05/21/2021 18:54  JOB #:  [id number] none

## 2021-05-22 NOTE — PROGRESS NOTES
Problem: Pain  Goal: *Control of Pain  Outcome: Progressing Towards Goal     Problem: Patient Education: Go to Patient Education Activity  Goal: Patient/Family Education  Outcome: Progressing Towards Goal     Problem: Risk for Spread of Infection  Goal: Prevent transmission of infectious organism to others  Description: Prevent the transmission of infectious organisms to other patients, staff members, and visitors. Outcome: Progressing Towards Goal     Problem: Patient Education:  Go to Education Activity  Goal: Patient/Family Education  Outcome: Progressing Towards Goal     Problem: Falls - Risk of  Goal: *Absence of Falls  Description: Document Kelley Mccray Fall Risk and appropriate interventions in the flowsheet. Outcome: Progressing Towards Goal  Note: Fall Risk Interventions:  Mobility Interventions: Communicate number of staff needed for ambulation/transfer         Medication Interventions: Patient to call before getting OOB         History of Falls Interventions: Door open when patient unattended, Consult care management for discharge planning         Problem: Patient Education: Go to Patient Education Activity  Goal: Patient/Family Education  Outcome: Progressing Towards Goal     Problem: Diabetes Self-Management  Goal: *Disease process and treatment process  Description: Define diabetes and identify own type of diabetes; list 3 options for treating diabetes. Outcome: Progressing Towards Goal  Goal: *Incorporating nutritional management into lifestyle  Description: Describe effect of type, amount and timing of food on blood glucose; list 3 methods for planning meals. Outcome: Progressing Towards Goal  Goal: *Incorporating physical activity into lifestyle  Description: State effect of exercise on blood glucose levels.   Outcome: Progressing Towards Goal  Goal: *Developing strategies to promote health/change behavior  Description: Define the ABC's of diabetes; identify appropriate screenings, schedule and personal plan for screenings. Outcome: Progressing Towards Goal  Goal: *Using medications safely  Description: State effect of diabetes medications on diabetes; name diabetes medication taking, action and side effects. Outcome: Progressing Towards Goal  Goal: *Monitoring blood glucose, interpreting and using results  Description: Identify recommended blood glucose targets  and personal targets. Outcome: Progressing Towards Goal  Goal: *Prevention, detection, treatment of acute complications  Description: List symptoms of hyper- and hypoglycemia; describe how to treat low blood sugar and actions for lowering  high blood glucose level. Outcome: Progressing Towards Goal  Goal: *Prevention, detection and treatment of chronic complications  Description: Define the natural course of diabetes and describe the relationship of blood glucose levels to long term complications of diabetes.   Outcome: Progressing Towards Goal  Goal: *Developing strategies to address psychosocial issues  Description: Describe feelings about living with diabetes; identify support needed and support network  Outcome: Progressing Towards Goal  Goal: *Insulin pump training  Outcome: Progressing Towards Goal  Goal: *Sick day guidelines  Outcome: Progressing Towards Goal  Goal: *Patient Specific Goal (EDIT GOAL, INSERT TEXT)  Outcome: Progressing Towards Goal     Problem: Patient Education: Go to Patient Education Activity  Goal: Patient/Family Education  Outcome: Progressing Towards Goal

## 2021-05-22 NOTE — PROGRESS NOTES
Problem: Pain  Goal: *Control of Pain  Outcome: Progressing Towards Goal     Problem: Patient Education: Go to Patient Education Activity  Goal: Patient/Family Education  Outcome: Progressing Towards Goal     Problem: Risk for Spread of Infection  Goal: Prevent transmission of infectious organism to others  Description: Prevent the transmission of infectious organisms to other patients, staff members, and visitors. Outcome: Progressing Towards Goal     Problem: Patient Education:  Go to Education Activity  Goal: Patient/Family Education  Outcome: Progressing Towards Goal     Problem: Falls - Risk of  Goal: *Absence of Falls  Description: Document Daren Trevino Fall Risk and appropriate interventions in the flowsheet. Outcome: Progressing Towards Goal  Note: Fall Risk Interventions:            Medication Interventions: Patient to call before getting OOB         History of Falls Interventions: Evaluate medications/consider consulting pharmacy         Problem: Patient Education: Go to Patient Education Activity  Goal: Patient/Family Education  Outcome: Progressing Towards Goal     Problem: Diabetes Self-Management  Goal: *Disease process and treatment process  Description: Define diabetes and identify own type of diabetes; list 3 options for treating diabetes. Outcome: Progressing Towards Goal  Goal: *Incorporating nutritional management into lifestyle  Description: Describe effect of type, amount and timing of food on blood glucose; list 3 methods for planning meals. Outcome: Progressing Towards Goal  Goal: *Incorporating physical activity into lifestyle  Description: State effect of exercise on blood glucose levels. Outcome: Progressing Towards Goal  Goal: *Developing strategies to promote health/change behavior  Description: Define the ABC's of diabetes; identify appropriate screenings, schedule and personal plan for screenings.   Outcome: Progressing Towards Goal  Goal: *Using medications safely  Description: [facility] effect of diabetes medications on diabetes; name diabetes medication taking, action and side effects. Outcome: Progressing Towards Goal  Goal: *Monitoring blood glucose, interpreting and using results  Description: Identify recommended blood glucose targets  and personal targets. Outcome: Progressing Towards Goal  Goal: *Prevention, detection, treatment of acute complications  Description: List symptoms of hyper- and hypoglycemia; describe how to treat low blood sugar and actions for lowering  high blood glucose level. Outcome: Progressing Towards Goal  Goal: *Prevention, detection and treatment of chronic complications  Description: Define the natural course of diabetes and describe the relationship of blood glucose levels to long term complications of diabetes.   Outcome: Progressing Towards Goal  Goal: *Developing strategies to address psychosocial issues  Description: Describe feelings about living with diabetes; identify support needed and support network  Outcome: Progressing Towards Goal  Goal: *Insulin pump training  Outcome: Progressing Towards Goal  Goal: *Sick day guidelines  Outcome: Progressing Towards Goal  Goal: *Patient Specific Goal (EDIT GOAL, INSERT TEXT)  Outcome: Progressing Towards Goal     Problem: Patient Education: Go to Patient Education Activity  Goal: Patient/Family Education  Outcome: Progressing Towards Goal

## 2021-05-22 NOTE — PROGRESS NOTES
Feels much better  AFVSS  Coarse juan carlos RRR Soft NT ND    CXR- some volume loss RLL    CTs- no leak, serous output    Plan  Keep tubes to suction  Follow cultures  Up and about  Regular diet

## 2021-05-22 NOTE — PROGRESS NOTES
Problem: Risk for Spread of Infection  Goal: Prevent transmission of infectious organism to others  Description: Prevent the transmission of infectious organisms to other patients, staff members, and visitors. Outcome: Progressing Towards Goal     Problem: Falls - Risk of  Goal: *Absence of Falls  Description: Document Ronald Machuca Fall Risk and appropriate interventions in the flowsheet.   Outcome: Progressing Towards Goal  Note: Fall Risk Interventions:  Mobility Interventions: Bed/chair exit alarm         Medication Interventions: Patient to call before getting OOB         History of Falls Interventions: Utilize gait belt for transfer/ambulation

## 2021-05-22 NOTE — PROGRESS NOTES
[address] [facility]  Hospitalist Group                                                                                          Hospitalist Progress Note  Trace Morse DO  Answering service: [phone] OR [phone] from in house phone     2021 : Date of admission    Date of Service:  2021  NAME:  Maddie De Guzman  :  1975  MRN:  [id number]      Admission Summary:   Gely Gilman a 39 y. o. male with past medical history of CAD, tobacco use disorder, non-insulin-dependent type 2 diabetes, dyslipidemia, hypertension who presented to [facility] with multiple complaints. He was admitted to [facility] on 21.  Patient endorsed a 1 month history of recurrent fevers, chills, night sweats, malaise.  He was a cigarette smoker, down from 2 packs prior to his MI last year. [facility]  had no recent travel or sick contacts. Marylou Huynh was not up-to-date on Covid vaccination. Patient states that he has a chronic cough which really did not change much prior to presentation. He states that what changed over the past month was that he was having right sided chest pain. This was mostly on the right lateral thorax. Patient was found to have right complex loculated pleural effusion, possible lung abscess. Thoracic surgery was consulted and  took patient for lung decortication on 2021.    21: Patient has been taken off airborne respiratory isolation by Infectious Disease today as TB has been ruled out with 2 negative acid fast bacilli smears. Interval history / Subjective:     Patient is doing well s/p decortication of the right lung via VATS by thoracic surgery on May 21, 2021. He states that right sided chest pain is much improved, but continues to  have some pain on the right lower lateral thorax at the insertion side of chest tubes X 2. Assessment & Plan:     1. Right empyema.  Patient is currently on broad spectrum antibiotics with IV vancomycin, IV Zosyn, doxycycline. S/p Decortication of the right lung via VATS by thoracic surgery on May 21, 2021.   2. Coronary artery disease. Patient had stents x 2 to RCA in April, 2020. In the setting of noncompliance to dual antiplatelet therapy, he developed instent stenosis, that was treated with PCI and furthermore,  had stent placed to the LAD on September 29, 2020. 3. Hypoalbuminemia. Albumin  25 g IV Q6 hours X 8 doses started on 5/20 at 6 pm.  Albumin improved from 2.0 on 5/20/21. Hypoalbuminemia may be contributing to tachycardia with reduced intravascular volume in the setting of decreased oncotic pressures. 4.  Persistent tachycardia. Feel that patient may have been behind in fluids. There was some concern because his sodium level went down to 129, and he was placed on fluid restriction to 1.5 L for possible SIADH. Patient has been taken off fluid restriction on the evening of 5/20/21.  5.  Insulin-dependent diabetes mellitus. Lantus increased to 25 units nightly from 15 units nightly on 5/21/2021. Continue adjunctive sliding scale insulin.           Code status: full      Care Plan discussed with: Nurse  Anticipated Disposition: Home w/Family  Anticipated Discharge: Greater than 48 hours     Hospital Problems  Never Reviewed        Codes Class Noted POA    [facility] ICD-10-CM: J85.2  ICD-9-CM: 513.0  5/18/2021 Unknown              Current Facility-Administered Medications:     [START ON 5/23/2021] vancomycin trough 5/23 @ 12:00, , Other, ONCE, Belen Hernandez, JENNIFER    diphenhydrAMINE (BENADRYL) capsule 25 mg, 25 mg, Oral, Q6H PRN, Agus Koo MD, 25 mg at 05/21/21 0120    insulin glargine (LANTUS) injection 25 Units, 25 Units, SubCUTAneous, QHS, Agus Koo MD, 25 Units at 05/21/21 2113    sodium chloride (NS) flush 5-40 mL, 5-40 mL, IntraVENous, Q8H, Agus Koo MD, 10 mL at 05/22/21 3503    sodium chloride (NS) flush 5-40 mL, 5-40 mL, IntraVENous, PRN, Dianne Laurent MD AILEEN    ketorolac (TORADOL) injection 30 mg, 30 mg, IntraVENous, Q6H, Magi Radford MD, 30 mg at 05/22/21 1826    oxyCODONE-acetaminophen (PERCOCET) 5-325 mg per tablet 2 Tablet, 2 Tablet, Oral, Q4H PRN, Magi Radford MD, 2 Tablet at 05/21/21 1700    HYDROmorphone (PF) (DILAUDID) injection 1 mg, 1 mg, IntraVENous, Q3H PRN, Magi Radford MD    naloxone [facility]) injection 0.4 mg, 0.4 mg, IntraVENous, PRN, Magi Radford MD    enoxaparin (LOVENOX) injection 40 mg, 40 mg, SubCUTAneous, Q24H, Magi Radford MD, 40 mg at 05/22/21 1048    [COMPLETED] vancomycin (VANCOCIN) 2000 mg in  ml infusion, 2,000 mg, IntraVENous, ONCE, Stopped at 05/20/21 0050 **FOLLOWED BY** vancomycin (VANCOCIN) 1500 mg in  ml infusion, 1,500 mg, IntraVENous, Q8H, Magi Radford MD, Last Rate: 250 mL/hr at 05/22/21 1209, 1,500 mg at 05/22/21 1209    Vancomycin - Pharmacy to Dose, , Other, Rx Dosing/Monitoring, Magi Radford MD    insulin lispro (HUMALOG) injection, , SubCUTAneous, Amrit Vu MD, 7 Units at 05/22/21 1622    glucose chewable tablet 16 g, 4 Tablet, Oral, PRN, Magi Radford MD    dextrose (D50W) injection syrg 12.5-25 g, 12.5-25 g, IntraVENous, PRN, Magi Radford MD    glucagon [facility]) injection 1 mg, 1 mg, IntraMUSCular, PRN, Magi Radford MD    piperacillin-tazobactam (ZOSYN) 3.375 g in 0.9% sodium chloride (MBP/ADV) 100 mL MBP, 3.375 g, IntraVENous, Q8H, Magi Radford MD, Last Rate: 25 mL/hr at 05/22/21 1622, 3.375 g at 05/22/21 1622    [Held by provider] metoprolol succinate (TOPROL-XL) XL tablet 50 mg, 50 mg, Oral, DAILY, Magi Radford MD    atorvastatin (LIPITOR) tablet 80 mg, 80 mg, Oral, DAILY, Magi Radford MD, 80 mg at 05/22/21 0837    nicotine (NICODERM CQ) 21 mg/24 hr patch 1 Patch, 1 Patch, TransDERmal, DAILY, Magi Radford MD    polyethylene glycol (MIRALAX) packet 17 g, 17 g, Oral, DAILY PRN, Sandra Lane MD    promethazine (PHENERGAN) tablet 12.5 mg, 12.5 mg, Oral, Q6H PRN **OR** ondansetron (ZOFRAN) injection 4 mg, 4 mg, IntraVENous, Q6H PRN, Sandra Lane MD    doxycycline (VIBRAMYCIN) 100 mg in 0.9% sodium chloride (MBP/ADV) 100 mL MBP, 100 mg, IntraVENous, Q12H, Sandra Lane MD, Last Rate: 100 mL/hr at 05/22/21 0838, 100 mg at 05/22/21 0838    melatonin tablet 3 mg, 3 mg, Oral, QHS PRN, Sandra Lane MD, 3 mg at 05/21/21 2147    Review of Systems:   A comprehensive review of systems was negative except for that written in the HPI. Vital Signs:     Patient Vitals for the past 24 hrs:  Patient Vitals for the past 24 hrs:   BP Temp Pulse Resp SpO2   05/22/21 1536 106/63 98.8 °F (37.1 °C) (!) 112 13 92 %   05/22/21 1118 118/67 98.2 °F (36.8 °C) (!) 104 18 93 %   05/22/21 0745 102/60 97.8 °F (36.6 °C) 100 17 92 %         Intake/Output Summary (Last 24 hours) at 5/22/2021 1853  Last data filed at 5/22/2021 1541  Gross per 24 hour   Intake 1400 ml   Output 3250 ml   Net -1850 ml        Physical Examination:     Estimated body mass index is 32.39 kg/m² as calculated from the following:    Height as of this encounter: 5' 6\" (1.676 m). Weight as of this encounter: 91 kg (200 lb 11.2 oz). Estimated body mass index is 32.39 kg/m² as calculated from the following:    Height as of this encounter: 5' 6\" (1.676 m). Weight as of this encounter: 91 kg (200 lb 11.2 oz). General: In no acute distress. Well developed, well nourished. Head: Normocephalic, atraumatic. Eyes: Anicteric sclera. PERRL. Extraocular muscles intact. ENT: External ears and nose appear normal.  Oral mucosa moist.  Neck: Supple. No jugular venous distention. Heart: Regular rhythm. Tachycardic. No murmurs appreciated. Chest: Symmetrical excursion. Clear to auscultation bilaterally. Chest tubes x 2 extending from right lower lateral thorax.  400 cc of serosanguinous pleural fluid in one chest tube and scant serosanguinous pleural fluid in the other. Abdomen: Soft, nontender. No abnormal distention. Bowel sounds are present throughout. Extremities: No gross deformities. No edema, no cyanosis. Feet are warm to touch. Neurological: No lateralizing deficits. Alert, oriented X3. Skin: No jaundice. No rashes.          Data Review:       Labs:     Recent Labs     05/21/21 0443   WBC 12.8*   HGB 8.8*   HCT 28.6*   *     Recent Labs     05/22/21 0101 05/21/21 0443 05/20/21  0353   * 137 135*   K 4.0 3.4* 3.4*   CL 99 101 98   CO2 30 28 26   BUN 10 5* 4*   CREA 0.80 0.48* 0.46*   * 290* 272*   CA 8.6 8.6 8.5   PHOS  --   --  2.9     Recent Labs     05/22/21 0101 05/21/21 0443 05/20/21  0353   ALT 34 28  --     114  --    TBILI 0.4 0.5  --    TP 7.8 7.6  --    ALB 2.5* 2.7* 2.0*   GLOB 5.3* 4.9*  --        ______________________________________________________________________  EXPECTED LENGTH OF STAY: 4d 9h  ACTUAL LENGTH OF STAY:          DO Pawel

## 2021-05-22 NOTE — PROGRESS NOTES
ID Progress Note  2021    Subjective:   C/o SOB, still having productive cough    Review of Systems:            Symptom Y/N Comments   Symptom Y/N Comments   Fever/Chills  n     Chest Pain n       Poor Appetite       Edema        Cough  y     Abdominal Pain        Sputum  y     Joint Pain        SOB/WARE  y     Pruritis/Rash        Nausea/vomit  n     Tolerating PT/OT        Diarrhea  n     Tolerating Diet        Constipation  n     Other           Could NOT obtain due to:       Objective:     Vitals:   Visit Vitals  /73 (BP 1 Location: Left upper arm, BP Patient Position: At rest)   Pulse 96   Temp 97.7 °F (36.5 °C)   Resp 13   Ht 5' 6\" (1.676 m) Comment: Per patient report 2021   Wt 91 kg (200 lb 11.2 oz)   SpO2 94%   BMI 32.39 kg/m²        Tmax:  Temp (24hrs), Av.2 °F (36.8 °C), Min:97.7 °F (36.5 °C), Max:99.3 °F (37.4 °C)      PHYSICAL EXAM:  General: Chronically ill appearing, WD, WN. Alert, cooperative, no acute distress    EENT:  EOMI. Anicteric sclerae. MMM  Resp:  Coarse breath sounds in apex with diminished breath sounds at bases, no wheezing or rales. No accessory muscle use  CV:  Regular  rhythm,  No edema, chest tubes inplace  GI:  Soft, Non distended, Non tender. +Bowel sounds  Neurologic:  Alert and oriented X 3, normal speech,   Psych:   Good insight. Not anxious nor agitated  Skin:  No rashes.   No jaundice    Labs:   Lab Results   Component Value Date/Time    WBC 12.8 (H) 2021 04:43 AM    HGB 8.8 (L) 2021 04:43 AM    HCT 28.6 (L) 2021 04:43 AM    PLATELET 653 (H)  04:43 AM    MCV 82.9 2021 04:43 AM     Lab Results   Component Value Date/Time    Sodium 135 (L) 2021 01:01 AM    Potassium 4.0 2021 01:01 AM    Chloride 99 2021 01:01 AM    CO2 30 2021 01:01 AM    Anion gap 6 2021 01:01 AM    Glucose 434 (H) 2021 01:01 AM    BUN 10 2021 01:01 AM    Creatinine 0.80 2021 01:01 AM    BUN/Creatinine ratio 13 05/22/2021 01:01 AM    GFR est AA >60 05/22/2021 01:01 AM    GFR est non-AA >60 05/22/2021 01:01 AM    Calcium 8.6 05/22/2021 01:01 AM    Bilirubin, total 0.4 05/22/2021 01:01 AM    Alk. phosphatase 109 05/22/2021 01:01 AM    Protein, total 7.8 05/22/2021 01:01 AM    Albumin 2.5 (L) 05/22/2021 01:01 AM    Globulin 5.3 (H) 05/22/2021 01:01 AM    A-G Ratio 0.5 (L) 05/22/2021 01:01 AM    ALT (SGPT) 34 05/22/2021 01:01 AM     CT of chest (5/18):  1. Multiloculated right empyema. 2. Right lower lobe pneumonia. 3. Right upper lobe pneumonia with possible small lung abscess. 4. Emphysema. 5. Coronary artery disease.       Urin x (5/18) no growth  Assessment and Plan   CAP   right lung abscess/right empyema  S/p right video assisted thoracoscopy, full pulmonary decortication (5/21) by Dr. Watkins Loud 19; rapid & PCR negative    WBC 15.1->12.8 (5/21), afebrile    Blood cx (5/18) no growth so far    Resp cx (5/18) Beta hemolytic group A strep    Fungal blood cx (5/19) pending    Pathology from Right pleural peel (5/21) pending    Fluid cx (5/21) pending     procal 1.93 (5/18)    O2 @ 2L via n/c    HIV (-)   rule out TB; AFB (-) x 3   quantiferon (5/18) indeterminate, aspergillus galact Ag (+)   respiratory panel (-)   legionella, strep pneumo pending   mycoplasma IgG (+) with (-) IgM; indicates previous exposure     continue with IV zosyn, doxycyline, and vancomycin   adjust ABX prn   fever work up if temp >= 100.4     Poorly controlled Type II DM  - A1C 13.2, management per primary team; continue with insulin therapy     CAD  - continue with ASA and statin    Above plan d/w and agreed by Dr. Padmini Kruse, NP

## 2021-05-23 ENCOUNTER — APPOINTMENT (OUTPATIENT)
Dept: GENERAL RADIOLOGY | Age: 46
DRG: 121 | End: 2021-05-23
Attending: THORACIC SURGERY (CARDIOTHORACIC VASCULAR SURGERY)
Payer: COMMERCIAL

## 2021-05-23 LAB
ALBUMIN SERPL-MCNC: 1.9 G/DL (ref 3.5–5)
ALBUMIN/GLOB SERPL: 0.4 {RATIO} (ref 1.1–2.2)
ALP SERPL-CCNC: 109 U/L (ref 45–117)
ALT SERPL-CCNC: 39 U/L (ref 12–78)
ANION GAP SERPL CALC-SCNC: 5 MMOL/L (ref 5–15)
AST SERPL-CCNC: 69 U/L (ref 15–37)
BACTERIA SPEC CULT: NORMAL
BASOPHILS # BLD: 0 K/UL (ref 0–0.1)
BASOPHILS NFR BLD: 0 % (ref 0–1)
BILIRUB SERPL-MCNC: 0.4 MG/DL (ref 0.2–1)
BUN SERPL-MCNC: 13 MG/DL (ref 6–20)
BUN/CREAT SERPL: 13 (ref 12–20)
CALCIUM SERPL-MCNC: 7.8 MG/DL (ref 8.5–10.1)
CHLORIDE SERPL-SCNC: 103 MMOL/L (ref 97–108)
CO2 SERPL-SCNC: 28 MMOL/L (ref 21–32)
CREAT SERPL-MCNC: 0.99 MG/DL (ref 0.7–1.3)
DATE LAST DOSE: ABNORMAL
DIFFERENTIAL METHOD BLD: ABNORMAL
EOSINOPHIL # BLD: 0.1 K/UL (ref 0–0.4)
EOSINOPHIL NFR BLD: 1 % (ref 0–7)
ERYTHROCYTE [DISTWIDTH] IN BLOOD BY AUTOMATED COUNT: 14.6 % (ref 11.5–14.5)
GLOBULIN SER CALC-MCNC: 4.6 G/DL (ref 2–4)
GLUCOSE BLD STRIP.AUTO-MCNC: 234 MG/DL (ref 65–117)
GLUCOSE BLD STRIP.AUTO-MCNC: 236 MG/DL (ref 65–117)
GLUCOSE BLD STRIP.AUTO-MCNC: 240 MG/DL (ref 65–117)
GLUCOSE BLD STRIP.AUTO-MCNC: 273 MG/DL (ref 65–117)
GLUCOSE SERPL-MCNC: 284 MG/DL (ref 65–100)
HCT VFR BLD AUTO: 26.3 % (ref 36.6–50.3)
HGB BLD-MCNC: 8 G/DL (ref 12.1–17)
IMM GRANULOCYTES # BLD AUTO: 0.2 K/UL (ref 0–0.04)
IMM GRANULOCYTES NFR BLD AUTO: 2 % (ref 0–0.5)
LYMPHOCYTES # BLD: 1.7 K/UL (ref 0.8–3.5)
LYMPHOCYTES NFR BLD: 13 % (ref 12–49)
MCH RBC QN AUTO: 26 PG (ref 26–34)
MCHC RBC AUTO-ENTMCNC: 30.4 G/DL (ref 30–36.5)
MCV RBC AUTO: 85.4 FL (ref 80–99)
MONOCYTES # BLD: 0.9 K/UL (ref 0–1)
MONOCYTES NFR BLD: 8 % (ref 5–13)
NEUTS SEG # BLD: 9.5 K/UL (ref 1.8–8)
NEUTS SEG NFR BLD: 76 % (ref 32–75)
NRBC # BLD: 0 K/UL (ref 0–0.01)
NRBC BLD-RTO: 0 PER 100 WBC
PLATELET # BLD AUTO: 518 K/UL (ref 150–400)
PMV BLD AUTO: 9.7 FL (ref 8.9–12.9)
POTASSIUM SERPL-SCNC: 3.2 MMOL/L (ref 3.5–5.1)
PROT SERPL-MCNC: 6.5 G/DL (ref 6.4–8.2)
RBC # BLD AUTO: 3.08 M/UL (ref 4.1–5.7)
REPORTED DOSE,DOSE: ABNORMAL UNITS
REPORTED DOSE/TIME,TMG: ABNORMAL
SERVICE CMNT-IMP: ABNORMAL
SERVICE CMNT-IMP: NORMAL
SODIUM SERPL-SCNC: 136 MMOL/L (ref 136–145)
VANCOMYCIN TROUGH SERPL-MCNC: 29.9 UG/ML (ref 5–10)
WBC # BLD AUTO: 12.4 K/UL (ref 4.1–11.1)

## 2021-05-23 PROCEDURE — 74011250637 HC RX REV CODE- 250/637: Performed by: THORACIC SURGERY (CARDIOTHORACIC VASCULAR SURGERY)

## 2021-05-23 PROCEDURE — 77010033678 HC OXYGEN DAILY

## 2021-05-23 PROCEDURE — P9047 ALBUMIN (HUMAN), 25%, 50ML: HCPCS | Performed by: INTERNAL MEDICINE

## 2021-05-23 PROCEDURE — 74011000258 HC RX REV CODE- 258: Performed by: THORACIC SURGERY (CARDIOTHORACIC VASCULAR SURGERY)

## 2021-05-23 PROCEDURE — 94760 N-INVAS EAR/PLS OXIMETRY 1: CPT

## 2021-05-23 PROCEDURE — 80053 COMPREHEN METABOLIC PANEL: CPT

## 2021-05-23 PROCEDURE — 74011250636 HC RX REV CODE- 250/636: Performed by: THORACIC SURGERY (CARDIOTHORACIC VASCULAR SURGERY)

## 2021-05-23 PROCEDURE — 74011250636 HC RX REV CODE- 250/636: Performed by: INTERNAL MEDICINE

## 2021-05-23 PROCEDURE — 74011636637 HC RX REV CODE- 636/637: Performed by: THORACIC SURGERY (CARDIOTHORACIC VASCULAR SURGERY)

## 2021-05-23 PROCEDURE — 99231 SBSQ HOSP IP/OBS SF/LOW 25: CPT | Performed by: THORACIC SURGERY (CARDIOTHORACIC VASCULAR SURGERY)

## 2021-05-23 PROCEDURE — 80202 ASSAY OF VANCOMYCIN: CPT

## 2021-05-23 PROCEDURE — 82962 GLUCOSE BLOOD TEST: CPT

## 2021-05-23 PROCEDURE — 65660000000 HC RM CCU STEPDOWN

## 2021-05-23 PROCEDURE — 74011250637 HC RX REV CODE- 250/637: Performed by: HOSPITALIST

## 2021-05-23 PROCEDURE — 36415 COLL VENOUS BLD VENIPUNCTURE: CPT

## 2021-05-23 PROCEDURE — 71045 X-RAY EXAM CHEST 1 VIEW: CPT

## 2021-05-23 PROCEDURE — 85025 COMPLETE CBC W/AUTO DIFF WBC: CPT

## 2021-05-23 RX ORDER — CLOPIDOGREL BISULFATE 75 MG/1
75 TABLET ORAL DAILY
COMMUNITY

## 2021-05-23 RX ORDER — ATORVASTATIN CALCIUM 80 MG/1
80 TABLET, FILM COATED ORAL
COMMUNITY

## 2021-05-23 RX ORDER — POTASSIUM CHLORIDE 750 MG/1
40 TABLET, FILM COATED, EXTENDED RELEASE ORAL ONCE
Status: COMPLETED | OUTPATIENT
Start: 2021-05-23 | End: 2021-05-23

## 2021-05-23 RX ORDER — AMOXICILLIN 250 MG
1 CAPSULE ORAL 2 TIMES DAILY
Status: DISPENSED | OUTPATIENT
Start: 2021-05-23 | End: 2021-05-28

## 2021-05-23 RX ORDER — ALBUMIN HUMAN 250 G/1000ML
25 SOLUTION INTRAVENOUS EVERY 6 HOURS
Status: DISCONTINUED | OUTPATIENT
Start: 2021-05-23 | End: 2021-06-03 | Stop reason: HOSPADM

## 2021-05-23 RX ORDER — METOPROLOL TARTRATE 25 MG/1
12.5 TABLET, FILM COATED ORAL 2 TIMES DAILY
Status: DISCONTINUED | OUTPATIENT
Start: 2021-05-23 | End: 2021-06-03 | Stop reason: HOSPADM

## 2021-05-23 RX ORDER — GLIPIZIDE 5 MG/1
5 TABLET ORAL 2 TIMES DAILY
COMMUNITY
End: 2021-06-03

## 2021-05-23 RX ORDER — METOPROLOL SUCCINATE 25 MG/1
25 TABLET, EXTENDED RELEASE ORAL DAILY
Status: ON HOLD | COMMUNITY
End: 2021-06-02 | Stop reason: SDUPTHER

## 2021-05-23 RX ORDER — GUAIFENESIN 100 MG/5ML
81 LIQUID (ML) ORAL DAILY
Status: DISCONTINUED | OUTPATIENT
Start: 2021-05-23 | End: 2021-06-03 | Stop reason: HOSPADM

## 2021-05-23 RX ORDER — NITROGLYCERIN 0.4 MG/1
0.4 TABLET SUBLINGUAL
COMMUNITY

## 2021-05-23 RX ORDER — METFORMIN HYDROCHLORIDE 500 MG/1
500 TABLET ORAL 2 TIMES DAILY WITH MEALS
COMMUNITY

## 2021-05-23 RX ORDER — ASPIRIN 81 MG/1
81 TABLET ORAL DAILY
COMMUNITY

## 2021-05-23 RX ADMIN — METOPROLOL TARTRATE 12.5 MG: 25 TABLET, FILM COATED ORAL at 21:20

## 2021-05-23 RX ADMIN — ASPIRIN 81 MG: 81 TABLET, CHEWABLE ORAL at 09:01

## 2021-05-23 RX ADMIN — VANCOMYCIN HYDROCHLORIDE 1500 MG: 10 INJECTION, POWDER, LYOPHILIZED, FOR SOLUTION INTRAVENOUS at 05:36

## 2021-05-23 RX ADMIN — VANCOMYCIN HYDROCHLORIDE 1500 MG: 10 INJECTION, POWDER, LYOPHILIZED, FOR SOLUTION INTRAVENOUS at 12:04

## 2021-05-23 RX ADMIN — ALBUMIN (HUMAN) 25 G: 0.25 INJECTION, SOLUTION INTRAVENOUS at 12:05

## 2021-05-23 RX ADMIN — ALBUMIN (HUMAN) 25 G: 0.25 INJECTION, SOLUTION INTRAVENOUS at 23:29

## 2021-05-23 RX ADMIN — INSULIN LISPRO 3 UNITS: 100 INJECTION, SOLUTION INTRAVENOUS; SUBCUTANEOUS at 12:04

## 2021-05-23 RX ADMIN — Medication 3 MG: at 23:28

## 2021-05-23 RX ADMIN — PIPERACILLIN AND TAZOBACTAM 3.38 G: 3; .375 INJECTION, POWDER, LYOPHILIZED, FOR SOLUTION INTRAVENOUS at 23:40

## 2021-05-23 RX ADMIN — PIPERACILLIN AND TAZOBACTAM 3.38 G: 3; .375 INJECTION, POWDER, LYOPHILIZED, FOR SOLUTION INTRAVENOUS at 16:48

## 2021-05-23 RX ADMIN — KETOROLAC TROMETHAMINE 30 MG: 30 INJECTION, SOLUTION INTRAMUSCULAR; INTRAVENOUS at 05:37

## 2021-05-23 RX ADMIN — PIPERACILLIN AND TAZOBACTAM 3.38 G: 3; .375 INJECTION, POWDER, LYOPHILIZED, FOR SOLUTION INTRAVENOUS at 08:18

## 2021-05-23 RX ADMIN — POTASSIUM CHLORIDE 40 MEQ: 750 TABLET, EXTENDED RELEASE ORAL at 09:01

## 2021-05-23 RX ADMIN — ATORVASTATIN CALCIUM 80 MG: 40 TABLET, FILM COATED ORAL at 09:01

## 2021-05-23 RX ADMIN — INSULIN LISPRO 3 UNITS: 100 INJECTION, SOLUTION INTRAVENOUS; SUBCUTANEOUS at 16:47

## 2021-05-23 RX ADMIN — DOCUSATE SODIUM 50 MG AND SENNOSIDES 8.6 MG 1 TABLET: 8.6; 5 TABLET, FILM COATED ORAL at 17:53

## 2021-05-23 RX ADMIN — HYDROMORPHONE HYDROCHLORIDE 1 MG: 1 INJECTION, SOLUTION INTRAMUSCULAR; INTRAVENOUS; SUBCUTANEOUS at 01:11

## 2021-05-23 RX ADMIN — INSULIN GLARGINE 25 UNITS: 100 INJECTION, SOLUTION SUBCUTANEOUS at 21:55

## 2021-05-23 RX ADMIN — INSULIN LISPRO 3 UNITS: 100 INJECTION, SOLUTION INTRAVENOUS; SUBCUTANEOUS at 07:44

## 2021-05-23 RX ADMIN — HYDROMORPHONE HYDROCHLORIDE 1 MG: 1 INJECTION, SOLUTION INTRAMUSCULAR; INTRAVENOUS; SUBCUTANEOUS at 21:48

## 2021-05-23 RX ADMIN — METOPROLOL TARTRATE 12.5 MG: 25 TABLET, FILM COATED ORAL at 09:01

## 2021-05-23 RX ADMIN — Medication 10 ML: at 21:22

## 2021-05-23 RX ADMIN — OXYCODONE HYDROCHLORIDE AND ACETAMINOPHEN 2 TABLET: 5; 325 TABLET ORAL at 13:15

## 2021-05-23 RX ADMIN — ALBUMIN (HUMAN) 25 G: 0.25 INJECTION, SOLUTION INTRAVENOUS at 17:54

## 2021-05-23 RX ADMIN — ALBUMIN (HUMAN) 25 G: 0.25 INJECTION, SOLUTION INTRAVENOUS at 05:36

## 2021-05-23 RX ADMIN — Medication 10 ML: at 05:37

## 2021-05-23 RX ADMIN — DOXYCYCLINE 100 MG: 100 INJECTION, POWDER, LYOPHILIZED, FOR SOLUTION INTRAVENOUS at 09:00

## 2021-05-23 RX ADMIN — DOXYCYCLINE 100 MG: 100 INJECTION, POWDER, LYOPHILIZED, FOR SOLUTION INTRAVENOUS at 21:21

## 2021-05-23 RX ADMIN — ENOXAPARIN SODIUM 40 MG: 40 INJECTION SUBCUTANEOUS at 10:15

## 2021-05-23 NOTE — PROGRESS NOTES
Problem: Pain  Goal: *Control of Pain  Outcome: Progressing Towards Goal     Problem: Falls - Risk of  Goal: *Absence of Falls  Description: Document Mesha Fall Risk and appropriate interventions in the flowsheet.   Outcome: Progressing Towards Goal  Note: Fall Risk Interventions:  Mobility Interventions: Communicate number of staff needed for ambulation/transfer         Medication Interventions: Patient to call before getting OOB         History of Falls Interventions: Door open when patient unattended, Consult care management for discharge planning         Problem: Breathing Pattern - Ineffective  Goal: *Absence of hypoxia  Outcome: Progressing Towards Goal  Goal: *Use of effective breathing techniques  Outcome: Progressing Towards Goal

## 2021-05-23 NOTE — ROUTINE PROCESS
Bedside shift change report given to Dara Jorge RN (oncoming nurse) by Francia Koyanagi, RN (offgoing nurse). Report included the following information SBAR.

## 2021-05-23 NOTE — PROGRESS NOTES
[facility]  Hospitalist Group                                                                                          Hospitalist Progress Note  Lalo Trujillo MD  Answering service: [phone] from in house phone     2021 : Date of admission    Date of Service:  2021  NAME:  Nikolay Tomlin  :  1975  MRN:  [id number]      Admission Summary:   Pennie Tsang is a 39 y. o. male with past medical history of CAD, tobacco use disorder, non-insulin-dependent type 2 diabetes, dyslipidemia, hypertension who presented to [facility] with multiple complaints. He was admitted to [facility] on 21.  Patient endorsed a 1 month history of recurrent fevers, chills, night sweats, malaise.  He was a cigarette smoker, down from 2 packs prior to his MI last year. Rivka Gallagher  had no recent travel or sick contacts. Brandna Dawn was not up-to-date on Covid vaccination. Patient states that he has a chronic cough which really did not change much prior to presentation. He states that what changed over the past month was that he was having right sided chest pain. This was mostly on the right lateral thorax. Patient was found to have right complex loculated pleural effusion, possible lung abscess. Thoracic surgery was consulted and  took  patient for decortication of the right lung via VATS by thoracic surgery on May 21, 2021.    21: Patient has been taken off airborne respiratory isolation by Infectious Disease today as TB has been ruled out with 2 negative acid fast bacilli smears. Interval history / Subjective:     Patient doing well and says no to CP/sob/n/v//d. He had no BM last several days. Assessment & Plan:     1. Right empyema. antibiotics with IV vancomycin, IV Zosyn, doxycycline per ID  S/p Decortication of the right lung via VATS by thoracic surgery on May 21, 2021.     2. CAD. with stents x 2 to RCA in .   noncompliance to dual antiplatelet therapy, he developed instent stenosis, that was treated with PCI and furthermore,  had stent placed to the LAD on September 29, 2020. Cont home  meds    3. Hypoalbuminemia. contributing to tachycardia with reduced intravascular volume in the setting of decreased oncotic pressures. S/p IV albumin    4. Persistent tachycardia. Improving with volume. Cont BB    5.  DM ty 2   lantus + ISS monitor BG    6. Hypokalemia- replete K    7.  Chr anemia- monitor Hgb, check Iron profile/B12/folate    Code status: full      Care Plan discussed with: Nurse  Anticipated Disposition: Home w/Family  Anticipated Discharge: Greater than 48 hours     Hospital Problems  Never Reviewed        Codes Class Noted POA    Lung abscess ([facility] [address] ICD-10-CM: J85.2  ICD-9-CM: 513.0  5/18/2021 Unknown              Current Facility-Administered Medications:     albumin human 25% (BUMINATE) solution 25 g, 25 g, IntraVENous, Q6H, Jacobo, Lm H, DO, 25 g at 05/23/21 0536    vancomycin trough 5/23 @ 12:00, , Other, ONCE, Belen Hernandez, NP    diphenhydrAMINE (BENADRYL) capsule 25 mg, 25 mg, Oral, Q6H PRN, John Kohli MD, 25 mg at 05/21/21 0120    insulin glargine (LANTUS) injection 25 Units, 25 Units, SubCUTAneous, QHS, John Kohli MD, 25 Units at 05/22/21 2141    sodium chloride (NS) flush 5-40 mL, 5-40 mL, IntraVENous, Q8H, John Kohli MD, 10 mL at 05/23/21 0537    sodium chloride (NS) flush 5-40 mL, 5-40 mL, IntraVENous, PRN, John Kohli MD    ketorolac (TORADOL) injection 30 mg, 30 mg, IntraVENous, Q6H, John Kohli MD, 30 mg at 05/23/21 0537    oxyCODONE-acetaminophen (PERCOCET) 5-325 mg per tablet 2 Tablet, 2 Tablet, Oral, Q4H PRN, John Kohli MD, 2 Tablet at 05/21/21 1700    HYDROmorphone (PF) (DILAUDID) injection 1 mg, 1 mg, IntraVENous, Q3H PRN, John Kohli MD, 1 mg at 05/23/21 0111    naloxone [facility]) injection 0.4 mg, 0.4 mg, IntraVENous, PRN, Sania Smith MD AILEEN    enoxaparin (LOVENOX) injection 40 mg, 40 mg, SubCUTAneous, Q24H, Marylee Amabile, MD, 40 mg at 05/22/21 1048    [COMPLETED] vancomycin (VANCOCIN) 2000 mg in  ml infusion, 2,000 mg, IntraVENous, ONCE, Stopped at 05/20/21 0050 **FOLLOWED BY** vancomycin (VANCOCIN) 1500 mg in  ml infusion, 1,500 mg, IntraVENous, Q8H, Marylee Amabile, MD, Last Rate: 250 mL/hr at 05/23/21 0536, 1,500 mg at 05/23/21 0536    Vancomycin - Pharmacy to Dose, , Other, Rx Dosing/Monitoring, Marylee Amabile, MD    insulin lispro (HUMALOG) injection, , SubCUTAneous, Anuel Villagomez MD, 3 Units at 05/23/21 0744    glucose chewable tablet 16 g, 4 Tablet, Oral, PRN, Marylee Amabile, MD    dextrose (D50W) injection syrg 12.5-25 g, 12.5-25 g, IntraVENous, PRN, Marylee Amabile, MD    glucagon [facility]) injection 1 mg, 1 mg, IntraMUSCular, PRN, Marylee Amabile, MD    piperacillin-tazobactam (ZOSYN) 3.375 g in 0.9% sodium chloride (MBP/ADV) 100 mL MBP, 3.375 g, IntraVENous, Q8H, Marylee Amabile, MD, Last Rate: 25 mL/hr at 05/23/21 0818, 3.375 g at 05/23/21 0818    [Held by provider] metoprolol succinate (TOPROL-XL) XL tablet 50 mg, 50 mg, Oral, DAILY, Marylee Amabile, MD    atorvastatin (LIPITOR) tablet 80 mg, 80 mg, Oral, DAILY, Marylee Amabile, MD, 80 mg at 05/22/21 0837    nicotine (NICODERM CQ) 21 mg/24 hr patch 1 Patch, 1 Patch, TransDERmal, DAILY, Marylee Amabile, MD    polyethylene glycol (MIRALAX) packet 17 g, 17 g, Oral, DAILY PRN, Marylee Amabile, MD    promethazine (PHENERGAN) tablet 12.5 mg, 12.5 mg, Oral, Q6H PRN **OR** ondansetron (ZOFRAN) injection 4 mg, 4 mg, IntraVENous, Q6H PRN, Marylee Amabile, MD    doxycycline (VIBRAMYCIN) 100 mg in 0.9% sodium chloride (MBP/ADV) 100 mL MBP, 100 mg, IntraVENous, Q12H, Marylee Amabile, MD, Last Rate: 100 mL/hr at 05/22/21 2040, 100 mg at 05/22/21 2040    melatonin tablet 3 mg, 3 mg, Oral, QHS PRN, Angie Anne MD AILEEN, 3 mg at 05/22/21 7062    Review of Systems:   A comprehensive review of systems was negative except for that written in the HPI. Vital Signs:     Patient Vitals for the past 24 hrs:  Patient Vitals for the past 24 hrs:   BP Temp Pulse Resp SpO2   05/22/21 1536 106/63 98.8 °F (37.1 °C) (!) 112 13 92 %   05/22/21 1118 118/67 98.2 °F (36.8 °C) (!) 104 18 93 %   05/22/21 0745 102/60 97.8 °F (36.6 °C) 100 17 92 %         Intake/Output Summary (Last 24 hours) at 5/23/2021 9610  Last data filed at 5/23/2021 0751  Gross per 24 hour   Intake 2840 ml   Output 1868 ml   Net 972 ml        Physical Examination:     Estimated body mass index is 32.39 kg/m² as calculated from the following:    Height as of this encounter: 5' 6\" (1.676 m). Weight as of this encounter: 91 kg (200 lb 11.2 oz). Estimated body mass index is 32.39 kg/m² as calculated from the following:    Height as of this encounter: 5' 6\" (1.676 m). Weight as of this encounter: 91 kg (200 lb 11.2 oz). General: In no acute distress. Well developed, well nourished. Head: Normocephalic, atraumatic. Eyes: Anicteric sclera. PERRL. Extraocular muscles intact. ENT: External ears and nose appear normal.  Oral mucosa moist.  Neck: Supple. No jugular venous distention. Heart: Regular rhythm. Tachycardic. No murmurs appreciated. Chest: Symmetrical excursion. Clear to auscultation bilaterally. Chest tubes x 2 extending from right lower lateral thorax. 400 cc of serosanguinous pleural fluid in one chest tube and scant serosanguinous pleural fluid in the other. Abdomen: Soft, nontender. No abnormal distention. Bowel sounds are present throughout. Extremities: No gross deformities. No edema, no cyanosis. Feet are warm to touch. Neurological: No lateralizing deficits. Alert, oriented X3. Skin: No jaundice. No rashes.          Data Review:       Labs:     Recent Labs     05/23/21  0342 05/21/21  0443   WBC 12.4* 12.8*   HGB 8.0* 8.8*   HCT 26.3* 28.6*   * 546*     Recent Labs     05/23/21  0342 05/22/21  0101 05/21/21  0443    135* 137   K 3.2* 4.0 3.4*    99 101   CO2 28 30 28   BUN 13 10 5*   CREA 0.99 0.80 0.48*   * 434* 290*   CA 7.8* 8.6 8.6     Recent Labs     05/23/21 0342 05/22/21  0101 05/21/21  0443   ALT 39 34 28    109 114   TBILI 0.4 0.4 0.5   TP 6.5 7.8 7.6   ALB 1.9* 2.5* 2.7*   GLOB 4.6* 5.3* 4.9*     CXR 5/23  IMPRESSION  No significant change. Subcutaneous emphysema and right chest tube.  No evidence  of pneumothorax.  ______________________________________________________________________  EXPECTED LENGTH OF STAY: 4d 9h  ACTUAL LENGTH OF STAY:          5                 Lalo Rojo MD

## 2021-05-23 NOTE — PROGRESS NOTES
Bedside shift change report given to [facility] CHRISTOPHER RN (oncoming nurse) by Clarissa Castrejon RN (offgoing nurse). Report included the following information SBAR, Kardex, MAR and Cardiac Rhythm NSR.

## 2021-05-23 NOTE — ROUTINE PROCESS
Bedside shift change report given to Sandra Dawson (oncoming nurse) by Tanja De Santiago (offgoing nurse). Report included the following information SBAR, OR Summary, Procedure Summary, Intake/Output, MAR, Recent Results and Cardiac Rhythm ST.

## 2021-05-23 NOTE — PROGRESS NOTES
Admission Medication Reconciliation:    Information obtained from:  Patient, [address]:  yes    Comments/Recommendations: Updated PTA meds/reviewed patient's allergies. 1)  Spoke with patient to update PTA mediation list and updated patient's pharmacy preference. 2) Patient's adherence to home medication is in question. Patient endorses taking his medication (which conflicts day supplies and notes in his chart). Confirmed with patient's [facility] pharmacy that he last picked his medications in early March (30 day supplies). Recommend further assessment to identify and address any potential barriers to medication adherence. ¹[employer] pharmacy benefit data reflects medications filled and processed through the patient's insurance, however   this data does NOT capture whether the medication was picked up or is currently being taken by the patient. Allergies:  Patient has no known allergies. Significant PMH/Disease States: History reviewed. No pertinent past medical history. Chief Complaint for this Admission:  No chief complaint on file. Prior to Admission Medications: . Prior to Admission Medications   Prescriptions Last Dose Informant Patient Reported? Taking?   aspirin delayed-release 81 mg tablet   Yes Yes   Sig: Take 81 mg by mouth daily. atorvastatin (Lipitor) 80 mg tablet   Yes Yes   Sig: Take 80 mg by mouth nightly. clopidogreL (Plavix) 75 mg tab   Yes Yes   Sig: Take 75 mg by mouth daily. glipiZIDE (GLUCOTROL) 5 mg tablet   Yes Yes   Sig: Take 5 mg by mouth two (2) times a day. metFORMIN (GLUCOPHAGE) 500 mg tablet   Yes Yes   Sig: Take 500 mg by mouth two (2) times daily (with meals). metoprolol succinate (TOPROL-XL) 25 mg XL tablet   Yes Yes   Sig: Take 25 mg by mouth daily. nitroglycerin (Nitrostat) 0.4 mg SL tablet   Yes Yes   Si.4 mg by SubLINGual route every five (5) minutes as needed for Chest Pain.       Facility-Administered Medications: None       Please contact the main inpatient pharmacy with any questions or concerns at [phone] and we will direct you to the clinical pharmacist covering this patient's care while in-house.    Linda Anguiano, LEED

## 2021-05-23 NOTE — PROGRESS NOTES
Doing well  AFVSS    Coarse juan carlos RRR soft NT ND    CT- no leak, serous drainage    CXR no change    Plan-  Tubes to waterseal  Up and about, IS use

## 2021-05-23 NOTE — ROUTINE PROCESS
Perfect serve for BP Ok Bosworth, NP Patient: Gray Joe  
 
5/22/21 9:14 PM  
[phone] Hospital or Facility: [facility], [facility] From: Wilian Coleman RE: Lisbeth Solorio 12/18/1987 RM: 440 elevated /105 156/105 CIWA 13 Need Callback: NO CALLBACK REQ 4W TELE Read 9:34 PM  
0'\" 5/22/21 9:35 PM  
Keep giving him Ativan Q1 
0'\" 5/22/21 9:35 PM  
Thanks! Unread

## 2021-05-23 NOTE — PROGRESS NOTES
Pharmacist Note - Vancomycin Dosing  Therapy day 5  Indication: right lung abscess/empyema   Current regimen: 1500 mg IV Q8H    Recent Labs     05/23/21  0342 05/22/21  0101 05/21/21  0443   WBC 12.4*  --  12.8*   CREA 0.99 0.80 0.48*   BUN 13 10 5*       A Trough Level resulted at 29.9 mcg/mL which was obtained 8 hrs post-dose. Goal trough: 15 - 20 mcg/mL     Plan: Discontinue scheduled doses of vancomycin. Repeat random level ordered for tomorrow with AM labs.

## 2021-05-24 ENCOUNTER — APPOINTMENT (OUTPATIENT)
Dept: GENERAL RADIOLOGY | Age: 46
DRG: 121 | End: 2021-05-24
Attending: THORACIC SURGERY (CARDIOTHORACIC VASCULAR SURGERY)
Payer: COMMERCIAL

## 2021-05-24 LAB
ANION GAP SERPL CALC-SCNC: 5 MMOL/L (ref 5–15)
BASOPHILS # BLD: 0.1 K/UL (ref 0–0.1)
BASOPHILS NFR BLD: 0 % (ref 0–1)
BUN SERPL-MCNC: 10 MG/DL (ref 6–20)
BUN/CREAT SERPL: 10 (ref 12–20)
CALCIUM SERPL-MCNC: 8.3 MG/DL (ref 8.5–10.1)
CHLORIDE SERPL-SCNC: 105 MMOL/L (ref 97–108)
CO2 SERPL-SCNC: 29 MMOL/L (ref 21–32)
CREAT SERPL-MCNC: 1.05 MG/DL (ref 0.7–1.3)
DIFFERENTIAL METHOD BLD: ABNORMAL
EOSINOPHIL # BLD: 0.1 K/UL (ref 0–0.4)
EOSINOPHIL NFR BLD: 1 % (ref 0–7)
ERYTHROCYTE [DISTWIDTH] IN BLOOD BY AUTOMATED COUNT: 14.9 % (ref 11.5–14.5)
FOLATE SERPL-MCNC: 6.2 NG/ML (ref 5–21)
GLUCOSE BLD STRIP.AUTO-MCNC: 181 MG/DL (ref 65–117)
GLUCOSE BLD STRIP.AUTO-MCNC: 202 MG/DL (ref 65–117)
GLUCOSE BLD STRIP.AUTO-MCNC: 251 MG/DL (ref 65–117)
GLUCOSE BLD STRIP.AUTO-MCNC: 320 MG/DL (ref 65–117)
GLUCOSE SERPL-MCNC: 198 MG/DL (ref 65–100)
HCT VFR BLD AUTO: 23.7 % (ref 36.6–50.3)
HGB BLD-MCNC: 7.1 G/DL (ref 12.1–17)
IMM GRANULOCYTES # BLD AUTO: 0.2 K/UL (ref 0–0.04)
IMM GRANULOCYTES NFR BLD AUTO: 2 % (ref 0–0.5)
IRON SATN MFR SERPL: 14 % (ref 20–50)
IRON SERPL-MCNC: 10 UG/DL (ref 35–150)
LYMPHOCYTES # BLD: 1.7 K/UL (ref 0.8–3.5)
LYMPHOCYTES NFR BLD: 12 % (ref 12–49)
MAGNESIUM SERPL-MCNC: 2.1 MG/DL (ref 1.6–2.4)
MCH RBC QN AUTO: 25.7 PG (ref 26–34)
MCHC RBC AUTO-ENTMCNC: 30 G/DL (ref 30–36.5)
MCV RBC AUTO: 85.9 FL (ref 80–99)
MONOCYTES # BLD: 1 K/UL (ref 0–1)
MONOCYTES NFR BLD: 7 % (ref 5–13)
NEUTS SEG # BLD: 10.9 K/UL (ref 1.8–8)
NEUTS SEG NFR BLD: 78 % (ref 32–75)
NRBC # BLD: 0 K/UL (ref 0–0.01)
NRBC BLD-RTO: 0 PER 100 WBC
PLATELET # BLD AUTO: 521 K/UL (ref 150–400)
PMV BLD AUTO: 9.7 FL (ref 8.9–12.9)
POTASSIUM SERPL-SCNC: 3.3 MMOL/L (ref 3.5–5.1)
RBC # BLD AUTO: 2.76 M/UL (ref 4.1–5.7)
SERVICE CMNT-IMP: ABNORMAL
SODIUM SERPL-SCNC: 139 MMOL/L (ref 136–145)
TIBC SERPL-MCNC: 72 UG/DL (ref 250–450)
VANCOMYCIN SERPL-MCNC: 10.9 UG/ML
VANCOMYCIN SERPL-MCNC: 21.6 UG/ML
VIT B12 SERPL-MCNC: 462 PG/ML (ref 193–986)
WBC # BLD AUTO: 14 K/UL (ref 4.1–11.1)

## 2021-05-24 PROCEDURE — 85025 COMPLETE CBC W/AUTO DIFF WBC: CPT

## 2021-05-24 PROCEDURE — 74011250636 HC RX REV CODE- 250/636: Performed by: THORACIC SURGERY (CARDIOTHORACIC VASCULAR SURGERY)

## 2021-05-24 PROCEDURE — 82962 GLUCOSE BLOOD TEST: CPT

## 2021-05-24 PROCEDURE — 74011000258 HC RX REV CODE- 258: Performed by: INTERNAL MEDICINE

## 2021-05-24 PROCEDURE — 74011000258 HC RX REV CODE- 258: Performed by: THORACIC SURGERY (CARDIOTHORACIC VASCULAR SURGERY)

## 2021-05-24 PROCEDURE — 94760 N-INVAS EAR/PLS OXIMETRY 1: CPT

## 2021-05-24 PROCEDURE — 74011250637 HC RX REV CODE- 250/637: Performed by: THORACIC SURGERY (CARDIOTHORACIC VASCULAR SURGERY)

## 2021-05-24 PROCEDURE — 99024 POSTOP FOLLOW-UP VISIT: CPT | Performed by: PHYSICIAN ASSISTANT

## 2021-05-24 PROCEDURE — 80202 ASSAY OF VANCOMYCIN: CPT

## 2021-05-24 PROCEDURE — 36415 COLL VENOUS BLD VENIPUNCTURE: CPT

## 2021-05-24 PROCEDURE — 82746 ASSAY OF FOLIC ACID SERUM: CPT

## 2021-05-24 PROCEDURE — 83540 ASSAY OF IRON: CPT

## 2021-05-24 PROCEDURE — 71045 X-RAY EXAM CHEST 1 VIEW: CPT

## 2021-05-24 PROCEDURE — 74011250636 HC RX REV CODE- 250/636: Performed by: HOSPITALIST

## 2021-05-24 PROCEDURE — 74011250637 HC RX REV CODE- 250/637: Performed by: HOSPITALIST

## 2021-05-24 PROCEDURE — P9047 ALBUMIN (HUMAN), 25%, 50ML: HCPCS | Performed by: INTERNAL MEDICINE

## 2021-05-24 PROCEDURE — 82607 VITAMIN B-12: CPT

## 2021-05-24 PROCEDURE — 80048 BASIC METABOLIC PNL TOTAL CA: CPT

## 2021-05-24 PROCEDURE — 77010033678 HC OXYGEN DAILY

## 2021-05-24 PROCEDURE — 65660000000 HC RM CCU STEPDOWN

## 2021-05-24 PROCEDURE — 74011250636 HC RX REV CODE- 250/636: Performed by: INTERNAL MEDICINE

## 2021-05-24 PROCEDURE — 74011636637 HC RX REV CODE- 636/637: Performed by: THORACIC SURGERY (CARDIOTHORACIC VASCULAR SURGERY)

## 2021-05-24 PROCEDURE — 74011000258 HC RX REV CODE- 258: Performed by: HOSPITALIST

## 2021-05-24 PROCEDURE — 83735 ASSAY OF MAGNESIUM: CPT

## 2021-05-24 RX ORDER — VANCOMYCIN HYDROCHLORIDE
1250 ONCE
Status: COMPLETED | OUTPATIENT
Start: 2021-05-24 | End: 2021-05-24

## 2021-05-24 RX ORDER — SODIUM FERRIC GLUCONATE COMPLEX IN SUCROSE 12.5 MG/ML
125 INJECTION INTRAVENOUS DAILY
Status: DISCONTINUED | OUTPATIENT
Start: 2021-05-24 | End: 2021-05-24 | Stop reason: SDUPTHER

## 2021-05-24 RX ORDER — LANOLIN ALCOHOL/MO/W.PET/CERES
1 CREAM (GRAM) TOPICAL 2 TIMES DAILY WITH MEALS
Status: DISCONTINUED | OUTPATIENT
Start: 2021-05-24 | End: 2021-06-03 | Stop reason: HOSPADM

## 2021-05-24 RX ADMIN — FERROUS SULFATE TAB 325 MG (65 MG ELEMENTAL FE) 325 MG: 325 (65 FE) TAB at 16:27

## 2021-05-24 RX ADMIN — METOPROLOL TARTRATE 12.5 MG: 25 TABLET, FILM COATED ORAL at 21:27

## 2021-05-24 RX ADMIN — VANCOMYCIN HYDROCHLORIDE 1250 MG: 10 INJECTION, POWDER, LYOPHILIZED, FOR SOLUTION INTRAVENOUS at 18:01

## 2021-05-24 RX ADMIN — INSULIN LISPRO 7 UNITS: 100 INJECTION, SOLUTION INTRAVENOUS; SUBCUTANEOUS at 17:58

## 2021-05-24 RX ADMIN — Medication 10 ML: at 07:38

## 2021-05-24 RX ADMIN — Medication 10 ML: at 13:57

## 2021-05-24 RX ADMIN — PIPERACILLIN AND TAZOBACTAM 3.38 G: 3; .375 INJECTION, POWDER, LYOPHILIZED, FOR SOLUTION INTRAVENOUS at 21:26

## 2021-05-24 RX ADMIN — OXYCODONE HYDROCHLORIDE AND ACETAMINOPHEN 2 TABLET: 5; 325 TABLET ORAL at 12:01

## 2021-05-24 RX ADMIN — INSULIN LISPRO 3 UNITS: 100 INJECTION, SOLUTION INTRAVENOUS; SUBCUTANEOUS at 07:36

## 2021-05-24 RX ADMIN — ENOXAPARIN SODIUM 40 MG: 40 INJECTION SUBCUTANEOUS at 11:52

## 2021-05-24 RX ADMIN — HYDROMORPHONE HYDROCHLORIDE 1 MG: 1 INJECTION, SOLUTION INTRAMUSCULAR; INTRAVENOUS; SUBCUTANEOUS at 13:52

## 2021-05-24 RX ADMIN — ATORVASTATIN CALCIUM 80 MG: 40 TABLET, FILM COATED ORAL at 08:37

## 2021-05-24 RX ADMIN — INSULIN GLARGINE 25 UNITS: 100 INJECTION, SOLUTION SUBCUTANEOUS at 21:38

## 2021-05-24 RX ADMIN — DOCUSATE SODIUM 50 MG AND SENNOSIDES 8.6 MG 1 TABLET: 8.6; 5 TABLET, FILM COATED ORAL at 08:37

## 2021-05-24 RX ADMIN — ALBUMIN (HUMAN) 25 G: 0.25 INJECTION, SOLUTION INTRAVENOUS at 11:52

## 2021-05-24 RX ADMIN — HYDROMORPHONE HYDROCHLORIDE 1 MG: 1 INJECTION, SOLUTION INTRAMUSCULAR; INTRAVENOUS; SUBCUTANEOUS at 03:37

## 2021-05-24 RX ADMIN — ALBUMIN (HUMAN) 25 G: 0.25 INJECTION, SOLUTION INTRAVENOUS at 07:37

## 2021-05-24 RX ADMIN — OXYCODONE HYDROCHLORIDE AND ACETAMINOPHEN 2 TABLET: 5; 325 TABLET ORAL at 21:38

## 2021-05-24 RX ADMIN — DOCUSATE SODIUM 50 MG AND SENNOSIDES 8.6 MG 1 TABLET: 8.6; 5 TABLET, FILM COATED ORAL at 17:58

## 2021-05-24 RX ADMIN — INSULIN LISPRO 2 UNITS: 100 INJECTION, SOLUTION INTRAVENOUS; SUBCUTANEOUS at 11:52

## 2021-05-24 RX ADMIN — PIPERACILLIN AND TAZOBACTAM 3.38 G: 3; .375 INJECTION, POWDER, LYOPHILIZED, FOR SOLUTION INTRAVENOUS at 08:37

## 2021-05-24 RX ADMIN — SODIUM CHLORIDE 125 MG: 9 INJECTION, SOLUTION INTRAVENOUS at 13:44

## 2021-05-24 RX ADMIN — ALBUMIN (HUMAN) 25 G: 0.25 INJECTION, SOLUTION INTRAVENOUS at 23:36

## 2021-05-24 RX ADMIN — ASPIRIN 81 MG: 81 TABLET, CHEWABLE ORAL at 08:37

## 2021-05-24 RX ADMIN — ALBUMIN (HUMAN) 25 G: 0.25 INJECTION, SOLUTION INTRAVENOUS at 17:58

## 2021-05-24 RX ADMIN — METOPROLOL TARTRATE 12.5 MG: 25 TABLET, FILM COATED ORAL at 08:37

## 2021-05-24 RX ADMIN — DOXYCYCLINE 100 MG: 100 INJECTION, POWDER, LYOPHILIZED, FOR SOLUTION INTRAVENOUS at 08:37

## 2021-05-24 RX ADMIN — HYDROMORPHONE HYDROCHLORIDE 1 MG: 1 INJECTION, SOLUTION INTRAMUSCULAR; INTRAVENOUS; SUBCUTANEOUS at 23:57

## 2021-05-24 NOTE — PROGRESS NOTES
[address] [facility] Adult  Hospitalist Group                                                                                          Hospitalist Progress Note  Lalo Haji MD  Answering service: [phone] from in house phone     2021 : Date of admission    Date of Service:  2021  NAME:  Maddie De Guzman  :  1975  MRN:  [id number]      Admission Summary:   Charli Tsang is a 39 y. o. male with past medical history of CAD, tobacco use disorder, non-insulin-dependent type 2 diabetes, dyslipidemia, hypertension who presented to [facility] with multiple complaints. He was admitted to [facility] on 21.  Patient endorsed a 1 month history of recurrent fevers, chills, night sweats, malaise.  He was a cigarette smoker, down from 2 packs prior to his MI last year. Nicki Mills  had no recent travel or sick contacts. Marylou Huynh was not up-to-date on Covid vaccination. Patient states that he has a chronic cough which really did not change much prior to presentation. He states that what changed over the past month was that he was having right sided chest pain. This was mostly on the right lateral thorax. Patient was found to have right complex loculated pleural effusion, possible lung abscess. Thoracic surgery was consulted and  took  patient for decortication of the right lung via VATS by thoracic surgery on May 21, 2021.    21: Patient has been taken off airborne respiratory isolation by Infectious Disease today as TB has been ruled out with 2 negative acid fast bacilli smears. Interval history / Subjective:     Patient seen and examined chart reviewed no acute issues reported per patient and denies any CP/sob/n/v/d. Assessment & Plan:     1. Right empyema. antibiotics with IV vancomycin, IV Zosyn, per ID  S/p Decortication of the right lung via VATS by thoracic surgery on May 21, 2021.     2. CAD.  with stents x 2 to RCA in April, 2020.  noncompliance to dual antiplatelet therapy, he developed instent stenosis, that was treated with PCI and furthermore,  had stent placed to the LAD on September 29, 2020. Cont home  meds    3. Hypoalbuminemia. contributing to tachycardia with reduced intravascular volume in the setting of decreased oncotic pressures. S/p IV albumin    4. Persistent tachycardia. Improving with volume. Cont BB    5.  DM ty 2   lantus + ISS monitor BG    6. Hypokalemia- replete K    7.  Chr anemia- monitor Hgb, Low Iron /B12/folate ok, replace oral and IV Iron, check stool occult blood    Code status: full      Care Plan discussed with: Nurse  Anticipated Disposition: Home w/Family  Anticipated Discharge: Greater than 48 hours     Hospital Problems  Never Reviewed        Codes Class Noted POA    Lung abscess ([facility] [address] ICD-10-CM: J85.2  ICD-9-CM: 513.0  5/18/2021 Unknown              Current Facility-Administered Medications:     Vancomycin Random level at 1600 today, , Other, ONCE, Nurys Orozco MD    albumin human 25% (BUMINATE) solution 25 g, 25 g, IntraVENous, Q6H, Lm Centeno, DO, 25 g at 05/24/21 0737    senna-docusate (PERICOLACE) 8.6-50 mg per tablet 1 Tablet, 1 Tablet, Oral, BID, Lalo Hope MD, 1 Tablet at 05/24/21 4923    aspirin chewable tablet 81 mg, 81 mg, Oral, DAILY, Lalo Hope MD, 81 mg at 05/24/21 0837    metoprolol tartrate (LOPRESSOR) tablet 12.5 mg, 12.5 mg, Oral, BID, Lalo Hope MD, 12.5 mg at 05/24/21 0837    diphenhydrAMINE (BENADRYL) capsule 25 mg, 25 mg, Oral, Q6H PRN, Fam Bustamante MD, 25 mg at 05/21/21 0120    insulin glargine (LANTUS) injection 25 Units, 25 Units, SubCUTAneous, QHS, Fam Bustamante MD, 25 Units at 05/23/21 2155    sodium chloride (NS) flush 5-40 mL, 5-40 mL, IntraVENous, Q8H, Fam Bustamante MD, 10 mL at 05/24/21 0738    sodium chloride (NS) flush 5-40 mL, 5-40 mL, IntraVENous, PRN, Fam Bustamante MD    oxyCODONE-acetaminophen (PERCOCET) 5-325 mg per tablet 2 Tablet, 2 Tablet, Oral, Q4H PRN, Jerry Carranza MD, 2 Tablet at 05/23/21 1315    HYDROmorphone (PF) (DILAUDID) injection 1 mg, 1 mg, IntraVENous, Q3H PRN, Jerry Carranza MD, 1 mg at 05/24/21 1527    naloxone [facility]) injection 0.4 mg, 0.4 mg, IntraVENous, PRN, Jerry Carranza MD    enoxaparin (LOVENOX) injection 40 mg, 40 mg, SubCUTAneous, Q24H, Jerry Carranza MD, 40 mg at 05/23/21 1015    Vancomycin - Pharmacy to Dose, , Other, Rx Dosing/Monitoring, Jerry Carranza MD    insulin lispro (HUMALOG) injection, , SubCUTAneous, Keyon Fraser MD, 3 Units at 05/24/21 0736    glucose chewable tablet 16 g, 4 Tablet, Oral, PRN, Jerry Carranza MD    dextrose (D50W) injection syrg 12.5-25 g, 12.5-25 g, IntraVENous, PRN, Jerry Carranza MD    glucagon [facility]) injection 1 mg, 1 mg, IntraMUSCular, PRN, Jerry Carranza MD    piperacillin-tazobactam (ZOSYN) 3.375 g in 0.9% sodium chloride (MBP/ADV) 100 mL MBP, 3.375 g, IntraVENous, Q8H, Anthony Trivedi MD, Last Rate: 25 mL/hr at 05/24/21 0837, 3.375 g at 05/24/21 0837    atorvastatin (LIPITOR) tablet 80 mg, 80 mg, Oral, DAILY, Jerry Carranza MD, 80 mg at 05/24/21 0837    nicotine (NICODERM CQ) 21 mg/24 hr patch 1 Patch, 1 Patch, TransDERmal, DAILY, Jerry Carranza MD    polyethylene glycol (MIRALAX) packet 17 g, 17 g, Oral, DAILY PRN, Jerry Carranza MD    promethazine (PHENERGAN) tablet 12.5 mg, 12.5 mg, Oral, Q6H PRN **OR** ondansetron (ZOFRAN) injection 4 mg, 4 mg, IntraVENous, Q6H PRN, Jerry Carranza MD    doxycycline (VIBRAMYCIN) 100 mg in 0.9% sodium chloride (MBP/ADV) 100 mL MBP, 100 mg, IntraVENous, Q12H, Jerry Carranza MD, Last Rate: 100 mL/hr at 05/24/21 0837, 100 mg at 05/24/21 0837    melatonin tablet 3 mg, 3 mg, Oral, QHS PRN, Jerry Carranza MD, 3 mg at 05/23/21 4153    Review of Systems:   A comprehensive review of systems was negative except for that written in the HPI. Vital Signs:     Patient Vitals for the past 24 hrs:  Patient Vitals for the past 24 hrs:   BP Temp Pulse Resp SpO2   05/22/21 1536 106/63 98.8 °F (37.1 °C) (!) 112 13 92 %   05/22/21 1118 118/67 98.2 °F (36.8 °C) (!) 104 18 93 %   05/22/21 0745 102/60 97.8 °F (36.6 °C) 100 17 92 %         Intake/Output Summary (Last 24 hours) at 5/24/2021 1031  Last data filed at 5/24/2021 9542  Gross per 24 hour   Intake    Output 2613 ml   Net -2613 ml        Physical Examination:     Estimated body mass index is 32.39 kg/m² as calculated from the following:    Height as of this encounter: 5' 6\" (1.676 m). Weight as of this encounter: 91 kg (200 lb 11.2 oz). Estimated body mass index is 32.39 kg/m² as calculated from the following:    Height as of this encounter: 5' 6\" (1.676 m). Weight as of this encounter: 91 kg (200 lb 11.2 oz). General: In no acute distress. Well developed, well nourished. Head: Normocephalic, atraumatic. Eyes: Anicteric sclera. PERRL. Extraocular muscles intact. ENT: External ears and nose appear normal.  Oral mucosa moist.  Neck: Supple. No jugular venous distention. Heart: Regular rhythm. Tachycardic. No murmurs appreciated. Chest: Symmetrical excursion. Clear to auscultation bilaterally. Chest tubes x 2 extending from right lower lateral thorax. 400 cc of serosanguinous pleural fluid in one chest tube and scant serosanguinous pleural fluid in the other. Abdomen: Soft, nontender. No abnormal distention. Bowel sounds are present throughout. Extremities: No gross deformities. No edema, no cyanosis. Feet are warm to touch. Neurological: No lateralizing deficits. Alert, oriented X3. Skin: No jaundice. No rashes.          Data Review:       Labs:     Recent Labs     05/24/21  0358 05/23/21  0342   WBC 14.0* 12.4*   HGB 7.1* 8.0*   HCT 23.7* 26.3*   * 518*     Recent Labs     05/24/21  0358 05/23/21  0342 05/22/21  0101    136 135*   K 3.3* 3. 2* 4.0    103 99   CO2 29 28 30   BUN 10 13 10   CREA 1.05 0.99 0.80   * 284* 434*   CA 8.3* 7.8* 8.6   MG 2.1  --   --      Recent Labs     05/23/21  0342 05/22/21  0101   ALT 39 34    109   TBILI 0.4 0.4   TP 6.5 7.8   ALB 1.9* 2.5*   GLOB 4.6* 5.3*     CXR 5/23  IMPRESSION  No significant change. Subcutaneous emphysema and right chest tube.  No evidence  of pneumothorax.  ______________________________________________________________________  EXPECTED LENGTH OF STAY: 4d 9h  ACTUAL LENGTH OF STAY:          6                 Lalo Bear MD

## 2021-05-24 NOTE — PROGRESS NOTES
Chart reviewed and attempted to see patient for OT evaluation. Patient stated this is the first chance he has had to rest and requested OT return tomorrow for intervention. Will follow up tomorrow. Thank you.

## 2021-05-24 NOTE — PROGRESS NOTES
Thoracic Surgery Simple Progress Note    Admit Date: 2021  POD: 3 Days Post-Op      Procedure:  Procedure(s):  RIGHT VATS, DECORTICATION       Subjective:     Patient has complaints: No significant medical complaints    Review of Systems:  CARDIAC: positive for HTN, HLD  RESP: positive for CAP, empyema, lung abscess, +smoker  NEURO:  negative  INCISION: Clean, dry, and intact  EXT: Denies new swelling or pain in the legs or calves. Objective:     Blood pressure 131/75, pulse (!) 111, temperature 98.6 °F (37 °C), resp. rate 19, height 5' 6\" (1.676 m), weight 200 lb 11.2 oz (91 kg), SpO2 90 %. Temp (24hrs), Av.7 °F (37.1 °C), Min:98.2 °F (36.8 °C), Max:99.2 °F (37.3 °C)        Hemodynamics    PAP    CO    CI     0701 -  1900  In: -   Out: 500 [Urine:500]  1901 -  0700  In: 8843 [P.O.:240; I.V.:2600]  Out: 3015 [Urine:2900]    EXAM:  GENERAL: VSS, afrible, alert and cooperative  HEART:  regular rate and rhythm  LUNG: clear to auscultation bilaterally. CXR reviewed. R angled CT w 330ml/24 hrs output & R str  CT w 145ml/24 hrs output. NEURO:  normal without focal findings  mental status, speech normal, A&O x3  INCISION: Clean, dry, and intact  EXTREMITIES:No evidence of DVT seen on physical exam.  GI/: Abd soft, nontender.  Voiding    Labs:  Recent Results (from the past 24 hour(s))   GLUCOSE, POC    Collection Time: 21 11:37 AM   Result Value Ref Range    Glucose (POC) 236 (H) 65 - 117 mg/dL    Performed by Tyron Roe, TROUGH    Collection Time: 21 11:51 AM   Result Value Ref Range    Vancomycin,trough 29.9 (HH) 5.0 - 10.0 ug/mL    Reported dose date NOT PROVIDED      Reported dose time: NOT PROVIDED      Reported dose: NOT PROVIDED UNITS   GLUCOSE, POC    Collection Time: 21  4:10 PM   Result Value Ref Range    Glucose (POC) 234 (H) 65 - 117 mg/dL    Performed by Joyce Randolph, POC    Collection Time: 21  9:54 PM   Result Value Ref Range    Glucose (POC) 273 (H) 65 - 117 mg/dL    Performed by [facility]    CBC WITH AUTOMATED DIFF    Collection Time: 05/24/21  3:58 AM   Result Value Ref Range    WBC 14.0 (H) 4.1 - 11.1 K/uL    RBC 2.76 (L) 4.10 - 5.70 M/uL    HGB 7.1 (L) 12.1 - 17.0 g/dL    HCT 23.7 (L) 36.6 - 50.3 %    MCV 85.9 80.0 - 99.0 FL    MCH 25.7 (L) 26.0 - 34.0 PG    MCHC 30.0 30.0 - 36.5 g/dL    RDW 14.9 (H) 11.5 - 14.5 %    PLATELET 147 (H) 781 - 400 K/uL    MPV 9.7 8.9 - 12.9 FL    NRBC 0.0 0  WBC    ABSOLUTE NRBC 0.00 0.00 - 0.01 K/uL    NEUTROPHILS 78 (H) 32 - 75 %    LYMPHOCYTES 12 12 - 49 %    MONOCYTES 7 5 - 13 %    EOSINOPHILS 1 0 - 7 %    BASOPHILS 0 0 - 1 %    IMMATURE GRANULOCYTES 2 (H) 0.0 - 0.5 %    ABS. NEUTROPHILS 10.9 (H) 1.8 - 8.0 K/UL    ABS. LYMPHOCYTES 1.7 0.8 - 3.5 K/UL    ABS. MONOCYTES 1.0 0.0 - 1.0 K/UL    ABS. EOSINOPHILS 0.1 0.0 - 0.4 K/UL    ABS. BASOPHILS 0.1 0.0 - 0.1 K/UL    ABS. IMM.  GRANS. 0.2 (H) 0.00 - 0.04 K/UL    DF AUTOMATED     IRON PROFILE    Collection Time: 05/24/21  3:58 AM   Result Value Ref Range    Iron 10 (L) 35 - 150 ug/dL    TIBC 72 (L) 250 - 450 ug/dL    Iron % saturation 14 (L) 20 - 50 %   VITAMIN B12    Collection Time: 05/24/21  3:58 AM   Result Value Ref Range    Vitamin B12 462 193 - 986 pg/mL   FOLATE    Collection Time: 05/24/21  3:58 AM   Result Value Ref Range    Folate 6.2 5.0 - 37.9 ng/mL   METABOLIC PANEL, BASIC    Collection Time: 05/24/21  3:58 AM   Result Value Ref Range    Sodium 139 136 - 145 mmol/L    Potassium 3.3 (L) 3.5 - 5.1 mmol/L    Chloride 105 97 - 108 mmol/L    CO2 29 21 - 32 mmol/L    Anion gap 5 5 - 15 mmol/L    Glucose 198 (H) 65 - 100 mg/dL    BUN 10 6 - 20 MG/DL    Creatinine 1.05 0.70 - 1.30 MG/DL    BUN/Creatinine ratio 10 (L) 12 - 20      GFR est AA >60 >60 ml/min/1.73m2    GFR est non-AA >60 >60 ml/min/1.73m2    Calcium 8.3 (L) 8.5 - 10.1 MG/DL   MAGNESIUM    Collection Time: 05/24/21  3:58 AM   Result Value Ref Range    Magnesium 2.1 1.6 - 2.4 mg/dL   VANCOMYCIN, RANDOM    Collection Time: 05/24/21  3:58 AM   Result Value Ref Range    Vancomycin, random 21.6 UG/ML   GLUCOSE, POC    Collection Time: 05/24/21  7:31 AM   Result Value Ref Range    Glucose (POC) 202 (H) 65 - 117 mg/dL    Performed by Gladys Rowe        Assessment:   No evidence of DVT.   Active Problems:    Lung abscess ([address] (5/18/2021)    cytology--pending  Plan/Recommendations:   CT to waterseal  Encourage OOB, ambulation w assistance  IV antibiotics  CXR in AM  Appreciate ID input  See orders    HALINA Inman  5/24/2021

## 2021-05-24 NOTE — PROGRESS NOTES
Pharmacist Note - Vancomycin Dosing  Therapy day 6  Indication: CAP, right lung abscess/empyema   s/p VATS 5/21  Current regimen:  on hold    Recent Labs     05/24/21  0358 05/23/21  0342 05/22/21  0101   WBC 14.0* 12.4*  --    CREA 1.05 0.99 0.80   BUN 10 13 10       A Random Level resulted at 10.9 mcg/mL which was obtained ~29 hrs post-dose. Goal trough: 15 - 20 mcg/mL     5/20 @ 1806 = 12.2 mcg/ml ~ 7 hrs post-dose [1250 mg q8h] dose increased  5/23 @ 1151 = 29.9 mcg/ml ~ 8 hrs post-dose [1500 mg q8h] hold  5/24 @ 0358 = 21.6 mcg/ml ~ 16 hrs post dose    Plan: Vancomycin 1250 mg x1 dose now  Will initiate Vanc 1250 mg q24h if SrCr remains unchanged  Pharmacy will continue to monitor this patient daily for changes in clinical status and renal function.

## 2021-05-24 NOTE — PROGRESS NOTES
ID Progress Note  2021    Subjective:   C/o SOB, still having productive cough    Review of Systems:            Symptom Y/N Comments   Symptom Y/N Comments   Fever/Chills  n     Chest Pain n       Poor Appetite       Edema        Cough  y     Abdominal Pain        Sputum  y     Joint Pain        SOB/WARE  y     Pruritis/Rash        Nausea/vomit  n     Tolerating PT/OT        Diarrhea  n     Tolerating Diet        Constipation  n     Other           Could NOT obtain due to:       Objective:     Vitals:   Visit Vitals  /75   Pulse (!) 111   Temp 98.6 °F (37 °C)   Resp 19   Ht 5' 6\" (1.676 m) Comment: Per patient report 2021   Wt 91 kg (200 lb 11.2 oz)   SpO2 90%   BMI 32.39 kg/m²        Tmax:  Temp (24hrs), Av.7 °F (37.1 °C), Min:98.2 °F (36.8 °C), Max:99.2 °F (37.3 °C)      PHYSICAL EXAM:  General: Chronically ill appearing, WD, WN. Alert, cooperative, no acute distress    EENT:  EOMI. Anicteric sclerae. MMM  Resp:  Coarse breath sounds in apex with diminished breath sounds at bases, no wheezing or rales. No accessory muscle use  CV:  Regular  rhythm,  No edema, chest tubes inplace  GI:  Soft, Non distended, Non tender. +Bowel sounds  Neurologic:  Alert and oriented X 3, normal speech,   Psych:   Good insight. Not anxious nor agitated  Skin:  No rashes.   No jaundice    Labs:   Lab Results   Component Value Date/Time    WBC 14.0 (H) 2021 03:58 AM    HGB 7.1 (L) 2021 03:58 AM    HCT 23.7 (L) 2021 03:58 AM    PLATELET 410 (H)  03:58 AM    MCV 85.9 2021 03:58 AM     Lab Results   Component Value Date/Time    Sodium 139 2021 03:58 AM    Potassium 3.3 (L) 2021 03:58 AM    Chloride 105 2021 03:58 AM    CO2 29 2021 03:58 AM    Anion gap 5 2021 03:58 AM    Glucose 198 (H) 2021 03:58 AM    BUN 10 2021 03:58 AM    Creatinine 1.05 2021 03:58 AM    BUN/Creatinine ratio 10 (L) 2021 03:58 AM    GFR est AA >60 2021 03:58 AM    GFR est non-AA >60 05/24/2021 03:58 AM    Calcium 8.3 (L) 05/24/2021 03:58 AM    Bilirubin, total 0.4 05/23/2021 03:42 AM    Alk. phosphatase 109 05/23/2021 03:42 AM    Protein, total 6.5 05/23/2021 03:42 AM    Albumin 1.9 (L) 05/23/2021 03:42 AM    Globulin 4.6 (H) 05/23/2021 03:42 AM    A-G Ratio 0.4 (L) 05/23/2021 03:42 AM    ALT (SGPT) 39 05/23/2021 03:42 AM     CT of chest (5/18):  1. Multiloculated right empyema. 2. Right lower lobe pneumonia. 3. Right upper lobe pneumonia with possible small lung abscess. 4. Emphysema. 5. Coronary artery disease.       Urin x (5/18) no growth  Assessment and Plan   CAP   right lung abscess/right empyema  S/p right video assisted thoracoscopy, full pulmonary decortication (5/21) by Dr. Polina Bush  leukocytosis  - COVID 19; rapid & PCR negative    WBC 14, afebrile    Blood cx (5/19) no growth so far    Resp cx (5/18) Beta hemolytic group A strep    Fungal blood cx (5/19) no growth so far    Pathology from Right pleural peel (5/21) pending    Fluid cx (5/21) STREPTOCOCCUS CONSTELLATUS     procal 1.93 (5/18)    O2 @ 2L via n/c    HIV (-)   rule out TB; AFB (-) x 3   quantiferon (5/18) indeterminate, aspergillus galact Ag (+)   respiratory panel (-)   legionella, strep pneumo pending   mycoplasma IgG (+) with (-) IgM; indicates previous exposure     continue with IV zosyn and vancomycin   D/C doxycyline 5/4   will adjust ABX prn   fever work up if temp >= 100.4     Poorly controlled Type II DM  - A1C 13.2, management per primary team; continue with insulin therapy     CAD  - continue with ASA and statin    Above plan d/w and agreed by Dr. Muriel Carr, NP

## 2021-05-24 NOTE — PROGRESS NOTES
Day #6 of Vancomycin  Indication: CAP, right lung abscess/empyema   S/p VATS   Current regimen:  on hold  Abx regimen:  vanc, doxycycline, Zosyn  ID Following ?: YES  Concomitant nephrotoxic drugs (requires more frequent monitoring): None  Frequency of BMP?: every day through     Recent Labs     21  0358 21  0342 21  0101   WBC 14.0* 12.4*  --    CREA 1.05 0.99 0.80   BUN 10 13 10     Est CrCl: 93 ml/min; UO: n/a ml/kg/hr  Temp (24hrs), Av.6 °F (37 °C), Min:98.1 °F (36.7 °C), Max:99.2 °F (37.3 °C)    Cultures:    Resp viral panel: Neg - Final   Legionella:  Neg - Final   S.pneumo AG, UR/CSF: Neg - Final   Mycoplasma: IgG + Indeterminate, IGM = neg - final   AFB: NG - final   Quantiferon-TB: Indeterminate - final   Sputum:  group A strep, Light normal sumi: final  : aspergillus: +    AFB: NG - final   AFB: NG - final   AFB:  pend    Pathology from Right pleural peel () pending    Goal trough = 15 - 20 mcg/mL    Recent trough history (date/time/level/dose/action taken):   @ 1806 = 12.2 mcg/ml ~ 7 hrs post-dose [1250 mg q8h] dose increased   @ 1151 = 29.9 mcg/ml ~ 8 hrs post-dose [1500 mg q8h] hold   @ 0358 = 21.6 mcg/ml ~ 16 hrs post dose    Plan: Random level at 1600 today.  Will redose with 1250 mg once Vancomycin level ~ 15 mcg/ml

## 2021-05-24 NOTE — PROGRESS NOTES
Bedside shift change report given to [address] [place] [address] (oncoming nurse) by Angella Gray RN (offgoing nurse).  Report included the following information SBAR, Kardex, MAR and Cardiac Rhythm ST.

## 2021-05-24 NOTE — PROGRESS NOTES
Reason for Admission:  Recurrent fevers, history of MI, sepsis                     RUR Score: 10% risk of re-admission                     Plan for utilizing home health:  TBD at this time, PT/OT consultations- PT on 5/20 endorses no needs, however, patient endorses he has not gotten OOB since his VATS procedure on 5/21. PCP: First and Last name:  Other, MD Francis     Name of Practice:    Are you a current patient: Yes/No:    Approximate date of last visit:    Can you participate in a virtual visit with your PCP:     Patient endorses locating a PCP on his own upon discharge- endorses that one was originally established for him, but it was a pediatrician- CM offered assistance, patient declined                     Current Advanced Directive/Advance Care Plan: Full Code      Healthcare Decision Maker:   Click here to complete [address] including selection of the [address] Relationship (ie \"Primary\") Patient endorses his mother Vonnie Whitaker ([phone]) is his emergency contact. Transition of Care Plan:   TBD    The CM met with the patient at bedside in order to introduce the role of CM and assess for patient needs. The patient's address on-file is incorrect- patient endorses that he lives with his mother at address below:    [address] [facility] [address] [facility], [address] [facility]    The patient endorses that at baseline he is independent with ADLs and mobility, denied use of DME in-the-home. The patient utilizes [address] in [place] for prescriptions. The patient will need [facility] transportation home upon discharge- gave consent to keep mother updated for any discharge needs. The patient is s/p VATS- on Oxygen, multiple IV antibiotics with ID following- monitor for home IV antibiotic needs, PT/OT following- needs to be re-assessed s/p VATS, patient endorses he hasn't been OOB really since his VATS procedure.       The CM will follow for transitions of care. PUSHPA Blank    Care Management Interventions  PCP Verified by CM:  (Patient does not have PCP, endorses he plans to correct on his own upon discharge- denied CM assistance at this time )  Palliative Care Criteria Met (RRAT>21 & CHF Dx)?: No  Mode of Transport at Discharge:  Other (see comment) (Patient will need [employer] [facility]/Medicaid transportation upon discharge)  Transition of Care Consult (CM Consult): Discharge Planning  Physical Therapy Consult: Yes  Occupational Therapy Consult: Yes  Speech Therapy Consult: No  Current Support Network: [place] (Patient endorses he lives with his mother )  Confirm Follow Up Transport: Other (see comment) ([address]  transportation )  Mary Provided?: No  Discharge Location  Discharge Placement: Unable to determine at this time

## 2021-05-24 NOTE — PROGRESS NOTES
[facility]side shift [facility] nurse) by Tj Tom RN (offgoing nurse). Report included the following information SBAR, Intake/Output, MAR, Med Rec Status and Cardiac Rhythm sinus tach.

## 2021-05-25 ENCOUNTER — APPOINTMENT (OUTPATIENT)
Dept: GENERAL RADIOLOGY | Age: 46
DRG: 121 | End: 2021-05-25
Attending: PHYSICIAN ASSISTANT
Payer: COMMERCIAL

## 2021-05-25 LAB
ANION GAP SERPL CALC-SCNC: 6 MMOL/L (ref 5–15)
BACTERIA SPEC CULT: ABNORMAL
BASOPHILS # BLD: 0.1 K/UL (ref 0–0.1)
BASOPHILS NFR BLD: 0 % (ref 0–1)
BUN SERPL-MCNC: 10 MG/DL (ref 6–20)
BUN/CREAT SERPL: 10 (ref 12–20)
CALCIUM SERPL-MCNC: 8.6 MG/DL (ref 8.5–10.1)
CHLORIDE SERPL-SCNC: 108 MMOL/L (ref 97–108)
CO2 SERPL-SCNC: 27 MMOL/L (ref 21–32)
CREAT SERPL-MCNC: 1.01 MG/DL (ref 0.7–1.3)
DIFFERENTIAL METHOD BLD: ABNORMAL
EOSINOPHIL # BLD: 0.2 K/UL (ref 0–0.4)
EOSINOPHIL NFR BLD: 1 % (ref 0–7)
ERYTHROCYTE [DISTWIDTH] IN BLOOD BY AUTOMATED COUNT: 15 % (ref 11.5–14.5)
GLUCOSE BLD STRIP.AUTO-MCNC: 172 MG/DL (ref 65–117)
GLUCOSE BLD STRIP.AUTO-MCNC: 201 MG/DL (ref 65–117)
GLUCOSE BLD STRIP.AUTO-MCNC: 265 MG/DL (ref 65–117)
GLUCOSE BLD STRIP.AUTO-MCNC: 373 MG/DL (ref 65–117)
GLUCOSE SERPL-MCNC: 168 MG/DL (ref 65–100)
GRAM STN SPEC: ABNORMAL
GRAM STN SPEC: ABNORMAL
HCT VFR BLD AUTO: 24.2 % (ref 36.6–50.3)
HGB BLD-MCNC: 7.2 G/DL (ref 12.1–17)
IMM GRANULOCYTES # BLD AUTO: 0.3 K/UL (ref 0–0.04)
IMM GRANULOCYTES NFR BLD AUTO: 2 % (ref 0–0.5)
LYMPHOCYTES # BLD: 1.9 K/UL (ref 0.8–3.5)
LYMPHOCYTES NFR BLD: 13 % (ref 12–49)
MAGNESIUM SERPL-MCNC: 2.3 MG/DL (ref 1.6–2.4)
MCH RBC QN AUTO: 25.5 PG (ref 26–34)
MCHC RBC AUTO-ENTMCNC: 29.8 G/DL (ref 30–36.5)
MCV RBC AUTO: 85.8 FL (ref 80–99)
MONOCYTES # BLD: 1.1 K/UL (ref 0–1)
MONOCYTES NFR BLD: 7 % (ref 5–13)
NEUTS SEG # BLD: 11.5 K/UL (ref 1.8–8)
NEUTS SEG NFR BLD: 77 % (ref 32–75)
NRBC # BLD: 0 K/UL (ref 0–0.01)
NRBC BLD-RTO: 0 PER 100 WBC
PLATELET # BLD AUTO: 505 K/UL (ref 150–400)
PMV BLD AUTO: 9.6 FL (ref 8.9–12.9)
POTASSIUM SERPL-SCNC: 3.5 MMOL/L (ref 3.5–5.1)
RBC # BLD AUTO: 2.82 M/UL (ref 4.1–5.7)
SERVICE CMNT-IMP: ABNORMAL
SODIUM SERPL-SCNC: 141 MMOL/L (ref 136–145)
WBC # BLD AUTO: 15 K/UL (ref 4.1–11.1)

## 2021-05-25 PROCEDURE — P9047 ALBUMIN (HUMAN), 25%, 50ML: HCPCS | Performed by: INTERNAL MEDICINE

## 2021-05-25 PROCEDURE — 77010033678 HC OXYGEN DAILY

## 2021-05-25 PROCEDURE — 97165 OT EVAL LOW COMPLEX 30 MIN: CPT

## 2021-05-25 PROCEDURE — 82962 GLUCOSE BLOOD TEST: CPT

## 2021-05-25 PROCEDURE — 74011250636 HC RX REV CODE- 250/636: Performed by: INTERNAL MEDICINE

## 2021-05-25 PROCEDURE — 74011250637 HC RX REV CODE- 250/637: Performed by: THORACIC SURGERY (CARDIOTHORACIC VASCULAR SURGERY)

## 2021-05-25 PROCEDURE — 74011000258 HC RX REV CODE- 258: Performed by: INTERNAL MEDICINE

## 2021-05-25 PROCEDURE — 74011636637 HC RX REV CODE- 636/637: Performed by: THORACIC SURGERY (CARDIOTHORACIC VASCULAR SURGERY)

## 2021-05-25 PROCEDURE — 74011250636 HC RX REV CODE- 250/636: Performed by: THORACIC SURGERY (CARDIOTHORACIC VASCULAR SURGERY)

## 2021-05-25 PROCEDURE — 85025 COMPLETE CBC W/AUTO DIFF WBC: CPT

## 2021-05-25 PROCEDURE — 74011250637 HC RX REV CODE- 250/637: Performed by: HOSPITALIST

## 2021-05-25 PROCEDURE — 83735 ASSAY OF MAGNESIUM: CPT

## 2021-05-25 PROCEDURE — 80048 BASIC METABOLIC PNL TOTAL CA: CPT

## 2021-05-25 PROCEDURE — 36415 COLL VENOUS BLD VENIPUNCTURE: CPT

## 2021-05-25 PROCEDURE — 74011250636 HC RX REV CODE- 250/636: Performed by: HOSPITALIST

## 2021-05-25 PROCEDURE — 65660000000 HC RM CCU STEPDOWN

## 2021-05-25 PROCEDURE — 99024 POSTOP FOLLOW-UP VISIT: CPT | Performed by: PHYSICIAN ASSISTANT

## 2021-05-25 PROCEDURE — 74011000258 HC RX REV CODE- 258: Performed by: HOSPITALIST

## 2021-05-25 PROCEDURE — 71045 X-RAY EXAM CHEST 1 VIEW: CPT

## 2021-05-25 RX ORDER — VANCOMYCIN/0.9 % SOD CHLORIDE 1.5G/250ML
1500 PLASTIC BAG, INJECTION (ML) INTRAVENOUS
Status: DISCONTINUED | OUTPATIENT
Start: 2021-05-25 | End: 2021-05-25

## 2021-05-25 RX ADMIN — DOCUSATE SODIUM 50 MG AND SENNOSIDES 8.6 MG 1 TABLET: 8.6; 5 TABLET, FILM COATED ORAL at 08:49

## 2021-05-25 RX ADMIN — ASPIRIN 81 MG: 81 TABLET, CHEWABLE ORAL at 08:49

## 2021-05-25 RX ADMIN — PIPERACILLIN AND TAZOBACTAM 3.38 G: 3; .375 INJECTION, POWDER, LYOPHILIZED, FOR SOLUTION INTRAVENOUS at 14:19

## 2021-05-25 RX ADMIN — FERROUS SULFATE TAB 325 MG (65 MG ELEMENTAL FE) 325 MG: 325 (65 FE) TAB at 08:49

## 2021-05-25 RX ADMIN — INSULIN GLARGINE 25 UNITS: 100 INJECTION, SOLUTION SUBCUTANEOUS at 21:50

## 2021-05-25 RX ADMIN — ALBUMIN (HUMAN) 25 G: 0.25 INJECTION, SOLUTION INTRAVENOUS at 05:09

## 2021-05-25 RX ADMIN — Medication 10 ML: at 05:10

## 2021-05-25 RX ADMIN — METOPROLOL TARTRATE 12.5 MG: 25 TABLET, FILM COATED ORAL at 08:49

## 2021-05-25 RX ADMIN — METOPROLOL TARTRATE 12.5 MG: 25 TABLET, FILM COATED ORAL at 21:49

## 2021-05-25 RX ADMIN — SODIUM CHLORIDE 125 MG: 9 INJECTION, SOLUTION INTRAVENOUS at 14:19

## 2021-05-25 RX ADMIN — ATORVASTATIN CALCIUM 80 MG: 40 TABLET, FILM COATED ORAL at 08:49

## 2021-05-25 RX ADMIN — ALBUMIN (HUMAN) 25 G: 0.25 INJECTION, SOLUTION INTRAVENOUS at 12:28

## 2021-05-25 RX ADMIN — ALBUMIN (HUMAN) 25 G: 0.25 INJECTION, SOLUTION INTRAVENOUS at 19:14

## 2021-05-25 RX ADMIN — VANCOMYCIN HYDROCHLORIDE 1500 MG: 10 INJECTION, POWDER, LYOPHILIZED, FOR SOLUTION INTRAVENOUS at 08:48

## 2021-05-25 RX ADMIN — PIPERACILLIN AND TAZOBACTAM 3.38 G: 3; .375 INJECTION, POWDER, LYOPHILIZED, FOR SOLUTION INTRAVENOUS at 05:09

## 2021-05-25 RX ADMIN — INSULIN LISPRO 2 UNITS: 100 INJECTION, SOLUTION INTRAVENOUS; SUBCUTANEOUS at 06:38

## 2021-05-25 RX ADMIN — DOCUSATE SODIUM 50 MG AND SENNOSIDES 8.6 MG 1 TABLET: 8.6; 5 TABLET, FILM COATED ORAL at 19:14

## 2021-05-25 RX ADMIN — ENOXAPARIN SODIUM 40 MG: 40 INJECTION SUBCUTANEOUS at 12:28

## 2021-05-25 RX ADMIN — Medication 10 ML: at 21:50

## 2021-05-25 RX ADMIN — CEFTRIAXONE SODIUM 2 G: 2 INJECTION, POWDER, FOR SOLUTION INTRAMUSCULAR; INTRAVENOUS at 19:14

## 2021-05-25 RX ADMIN — INSULIN LISPRO 5 UNITS: 100 INJECTION, SOLUTION INTRAVENOUS; SUBCUTANEOUS at 16:22

## 2021-05-25 RX ADMIN — FERROUS SULFATE TAB 325 MG (65 MG ELEMENTAL FE) 325 MG: 325 (65 FE) TAB at 16:22

## 2021-05-25 RX ADMIN — HYDROMORPHONE HYDROCHLORIDE 1 MG: 1 INJECTION, SOLUTION INTRAMUSCULAR; INTRAVENOUS; SUBCUTANEOUS at 19:21

## 2021-05-25 RX ADMIN — INSULIN LISPRO 3 UNITS: 100 INJECTION, SOLUTION INTRAVENOUS; SUBCUTANEOUS at 12:28

## 2021-05-25 RX ADMIN — HYDROMORPHONE HYDROCHLORIDE 1 MG: 1 INJECTION, SOLUTION INTRAMUSCULAR; INTRAVENOUS; SUBCUTANEOUS at 22:35

## 2021-05-25 RX ADMIN — ALBUMIN (HUMAN) 25 G: 0.25 INJECTION, SOLUTION INTRAVENOUS at 23:16

## 2021-05-25 NOTE — PROGRESS NOTES
Transitions of Care: 9% risk of re-admission      -TBD, monitoring for IV antibiotic needs- [place] need [place]   -Will need [employer] transportation       The CM met with the patient at bedside- CM noted in OT note, may need CM help filling out Disability paperwork. The CM met with patient to discuss- CM cannot apply for Social Security Disability for the patient, however, CM offered to provide a resource packet with information and how-to guide on applying for SSD- the patient declined. He endorses he is working with his [address] for disability information. PT re-evaluation pending, OT endorses no needs upon discharge. The CM will continue to follow for transitions of care. If patient needs IV antibiotics- will take time to establish[place].      PUSHPA Leary

## 2021-05-25 NOTE — PROGRESS NOTES
Problem: Pain  Goal: *Control of Pain  Outcome: Progressing Towards Goal     Problem: Risk for Spread of Infection  Goal: Prevent transmission of infectious organism to others  Description: Prevent the transmission of infectious organisms to other patients, staff members, and visitors. Outcome: Progressing Towards Goal     Problem: Falls - Risk of  Goal: *Absence of Falls  Description: Document [place] Fall Risk and appropriate interventions in the flowsheet. Outcome: Progressing Towards Goal  Note: Fall Risk Interventions:  Mobility Interventions: Patient to call before getting OOB         Medication Interventions: Evaluate medications/consider consulting pharmacy         History of Falls Interventions: Vital signs minimum Q4HRs X 24 hrs (comment for end date)         Problem: Diabetes Self-Management  Goal: *Disease process and treatment process  Description: Define diabetes and identify own type of diabetes; list 3 options for treating diabetes. Outcome: Progressing Towards Goal  Goal: *Incorporating nutritional management into lifestyle  Description: Describe effect of type, amount and timing of food on blood glucose; list 3 methods for planning meals. Outcome: Progressing Towards Goal  Goal: *Incorporating physical activity into lifestyle  Description: State effect of exercise on blood glucose levels. Outcome: Progressing Towards Goal  Goal: *Developing strategies to promote health/change behavior  Description: Define the ABC's of diabetes; identify appropriate screenings, schedule and personal plan for screenings. Outcome: Progressing Towards Goal  Goal: *Using medications safely  Description: State effect of diabetes medications on diabetes; name diabetes medication taking, action and side effects. Outcome: Progressing Towards Goal  Goal: *Monitoring blood glucose, interpreting and using results  Description: Identify recommended blood glucose targets  and personal targets.   Outcome: Progressing Towards Goal  Goal: *Prevention, detection, treatment of acute complications  Description: List symptoms of hyper- and hypoglycemia; describe how to treat low blood sugar and actions for lowering  high blood glucose level.   Outcome: Progressing Towards Goal     Problem: Breathing Pattern - Ineffective  Goal: *Absence of hypoxia  Outcome: Progressing Towards Goal  Goal: *Use of effective breathing techniques  Outcome: Progressing Towards Goal  Goal: *PALLIATIVE CARE:  Alleviation of Dyspnea  Outcome: Progressing Towards Goal

## 2021-05-25 NOTE — PROGRESS NOTES
NUTRITION  Reason for Screen: LOS        RECOMMENDATIONS:   1. Continue current diet. Interventions   - continue current diet       Information obtained from:   patient  History reviewed. No pertinent past medical history. Pt reviewed d/t ongoing LOS, s/p R VATS. Pt reports appetite stable/good, wt overall stable and NKFA or intolerances to report. Pt will continue to be monitored per policy. Diet:  cardiac  Supplements: None  Meal Intake:   No data found.       Weight Changes:   Stable  Wt Readings from Last 10 Encounters:   05/25/21 88.8 kg (195 lb 12.8 oz)       Nutrition-Related Concerns Identified:  None    Estimated Nutrition Needs:   Energy: 1298  Wt used: Current  Protein: 90 (1 g/kg)  Wt used: Current   Fluid: 2225     PLAN:   - Continue current diet  - Rescreen per policy  - Follow wt trends    Will revisit per policy    Summer Peraza RD    Contact via [employer]

## 2021-05-25 NOTE — PROGRESS NOTES
Bedside shift change report given to [facility] (oncoming nurse) by Ambika Gomez RN (offgoing nurse). Report included the following information SBAR, Intake/Output, MAR, Med Rec Status and Cardiac Rhythm sinus tach.

## 2021-05-25 NOTE — PROGRESS NOTES
[facility]  Hospitalist Group                                                                                          Hospitalist Progress Note  Lalo Castaneda MD  Answering service: [phone] from in house phone     2021 : Date of admission    Date of Service:  2021  NAME:  Marisol Caldwell  :  1975  MRN:  [id number]      Admission Summary:   Clara Tsang is a 39 y. o. male with past medical history of CAD, tobacco use disorder, non-insulin-dependent type 2 diabetes, dyslipidemia, hypertension who presented to [place] with multiple complaints. He was admitted to [facility] on 21.  Patient endorsed a 1 month history of recurrent fevers, chills, night sweats, malaise.  He was a cigarette smoker, down from 2 packs prior to his MI last year. Vasyl Handler  had no recent travel or sick contacts. Stefanie Rodriguez was not up-to-date on Covid vaccination. Patient states that he has a chronic cough which really did not change much prior to presentation. He states that what changed over the past month was that he was having right sided chest pain. This was mostly on the right lateral thorax. Patient was found to have right complex loculated pleural effusion, possible lung abscess. Thoracic surgery was consulted and  took  patient for decortication of the right lung via VATS by thoracic surgery on May 21, 2021.    21: Patient has been taken off airborne respiratory isolation by Infectious Disease today as TB has been ruled out with 2 negative acid fast bacilli smears. Interval history / Subjective:     Patient seen and examined chart reviewed no acute issues reported per patient and denies any CP/sob/n/v/d. One of the chest tube was removed by CT surgery       Assessment & Plan:     1. Right empyema.    antibiotics with IV vancomycin, IV Zosyn, per ID stopped doxy  S/p Decortication of the right lung via VATS by thoracic surgery on May 21, 2021.   Cont CT management per CTS    2. CAD. with stents x 2 to RCA in April, 2020. noncompliance to dual antiplatelet therapy, he developed instent stenosis, that was treated with PCI and furthermore,  had stent placed to the LAD on September 29, 2020. Cont home  meds    3. Hypoalbuminemia. contributing to tachycardia with reduced intravascular volume in the setting of decreased oncotic pressures. S/p IV albumin    4. Persistent tachycardia. Improving with volume. Cont BB    5.  DM ty 2   lantus + ISS monitor BG    6. Hypokalemia- repleted K    7.  Chr anemia- monitor Hgb, Low Iron /B12/folate ok, replace oral and IV Iron, f/u  stool occult blood    Code status: full      Care Plan discussed with: Nurse  Anticipated Disposition: Home w/Family  Anticipated Discharge: Greater than 48 hours     Hospital Problems  Never Reviewed        Codes Class Noted POA    Lung abscess ([facility] [address] ICD-10-CM: J85.2  ICD-9-CM: 513.0  5/18/2021 Unknown              Current Facility-Administered Medications:     vancomycin (VANCOCIN) 1500 mg in  ml infusion, 1,500 mg, IntraVENous, Q18H, Theo Collado MD, Last Rate: 250 mL/hr at 05/25/21 0848, 1,500 mg at 05/25/21 0848    ferrous sulfate tablet 325 mg, 1 Tablet, Oral, BID WITH MEALS, Lalo Hope MD, 325 mg at 05/25/21 0849    ferric gluconate (FERRLECIT) 125 mg in 0.9% sodium chloride 100 mL IVPB, 125 mg, IntraVENous, Q24H, Lalo Hope MD, Last Rate: 110 mL/hr at 05/24/21 1344, 125 mg at 05/24/21 1344    albumin human 25% (BUMINATE) solution 25 g, 25 g, IntraVENous, Q6H, Lm Centeno DO, 25 g at 05/25/21 1228    senna-docusate (PERICOLACE) 8.6-50 mg per tablet 1 Tablet, 1 Tablet, Oral, BID, Lalo Hope MD, 1 Tablet at 05/25/21 0849    aspirin chewable tablet 81 mg, 81 mg, Oral, DAILY, Lalo Hope MD, 81 mg at 05/25/21 0849    metoprolol tartrate (LOPRESSOR) tablet 12.5 mg, 12.5 mg, Oral, BID, Herminia, Lalo SMILEY MD, 12.5 mg at 05/25/21 0849    diphenhydrAMINE (BENADRYL) capsule 25 mg, 25 mg, Oral, Q6H PRN, Cachorro Hutiron MD, 25 mg at 05/21/21 0120    insulin glargine (LANTUS) injection 25 Units, 25 Units, SubCUTAneous, QHS, Cachorro Huitron MD, 25 Units at 05/24/21 2138    sodium chloride (NS) flush 5-40 mL, 5-40 mL, IntraVENous, Q8H, Cachorro Huitron MD, 10 mL at 05/25/21 0510    sodium chloride (NS) flush 5-40 mL, 5-40 mL, IntraVENous, PRN, Cachorro Huitron MD    oxyCODONE-acetaminophen (PERCOCET) 5-325 mg per tablet 2 Tablet, 2 Tablet, Oral, Q4H PRN, Cachorro Huitron MD, 2 Tablet at 05/24/21 2138    HYDROmorphone (PF) (DILAUDID) injection 1 mg, 1 mg, IntraVENous, Q3H PRN, Cachorro Huitron MD, 1 mg at 05/24/21 2357    naloxone [facility]) injection 0.4 mg, 0.4 mg, IntraVENous, PRN, Cachorro Huitron MD    enoxaparin (LOVENOX) injection 40 mg, 40 mg, SubCUTAneous, Q24H, Cachorro Huitron MD, 40 mg at 05/25/21 1228    Vancomycin - Pharmacy to Dose, , Other, Rx Dosing/Monitoring, Cachorro Huitron MD    insulin lispro (HUMALOG) injection, , SubCUTAneous, Radha Vilchis MD, 3 Units at 05/25/21 1228    glucose chewable tablet 16 g, 4 Tablet, Oral, PRN, Cachorro Huitron MD    dextrose (D50W) injection syrg 12.5-25 g, 12.5-25 g, IntraVENous, PRN, Cachorro Huitron MD    glucagon [facility]) injection 1 mg, 1 mg, IntraMUSCular, PRN, Cachorro Huitron MD    piperacillin-tazobactam (ZOSYN) 3.375 g in 0.9% sodium chloride (MBP/ADV) 100 mL MBP, 3.375 g, IntraVENous, Q8H, Amparo Helton MD, Last Rate: 25 mL/hr at 05/25/21 0509, 3.375 g at 05/25/21 0509    atorvastatin (LIPITOR) tablet 80 mg, 80 mg, Oral, DAILY, Cachorro Huitron MD, 80 mg at 05/25/21 0849    nicotine (NICODERM CQ) 21 mg/24 hr patch 1 Patch, 1 Patch, TransDERmal, DAILY, Cachorro Huitron MD    polyethylene glycol (MIRALAX) packet 17 g, 17 g, Oral, DAILY PRN, Cachorro Huitron MD    promethazine (PHENERGAN) tablet 12.5 mg, 12.5 mg, Oral, Q6H PRN **OR** ondansetron (ZOFRAN) injection 4 mg, 4 mg, IntraVENous, Q6H PRN, Gregory Díaz MD    melatonin tablet 3 mg, 3 mg, Oral, QHS PRN, Gregory Díaz MD, 3 mg at 05/23/21 5480    Review of Systems:   A comprehensive review of systems was negative except for that written in the HPI. Vital Signs:     Patient Vitals for the past 24 hrs:  Patient Vitals for the past 24 hrs:   BP Temp Pulse Resp SpO2   05/22/21 1536 106/63 98.8 °F (37.1 °C) (!) 112 13 92 %   05/22/21 1118 118/67 98.2 °F (36.8 °C) (!) 104 18 93 %   05/22/21 0745 102/60 97.8 °F (36.6 °C) 100 17 92 %         Intake/Output Summary (Last 24 hours) at 5/25/2021 1245  Last data filed at 5/25/2021 0854  Gross per 24 hour   Intake 1400 ml   Output 870 ml   Net 530 ml        Physical Examination:     Estimated body mass index is 31.6 kg/m² as calculated from the following:    Height as of this encounter: 5' 6\" (1.676 m). Weight as of this encounter: 88.8 kg (195 lb 12.8 oz). Estimated body mass index is 31.6 kg/m² as calculated from the following:    Height as of this encounter: 5' 6\" (1.676 m). Weight as of this encounter: 88.8 kg (195 lb 12.8 oz). General: In no acute distress. Well developed, well nourished. Head: Normocephalic, atraumatic. Eyes: Anicteric sclera. ENT:   Oral mucosa moist.  Neck: Supple. Heart: Regular rhythm. Tachycardic. No murmurs appreciated. Chest: Symmetrical expansion  Chest tubes x 1 extending from right lower lateral thorax. Abdomen: Soft, nontender. No abnormal distention. Extremities: No gross deformities. No edema, no cyanosis. Neurological:   Alert, oriented X3. Skin: No jaundice. No rashes.          Data Review:       Labs:     Recent Labs     05/25/21  0326 05/24/21  0358   WBC 15.0* 14.0*   HGB 7.2* 7.1*   HCT 24.2* 23.7*   * 521*     Recent Labs     05/25/21  0326 05/24/21  0358 05/23/21  0342    139 136   K 3.5 3.3* 3.2*    105 103   CO2 27 29 28   BUN 10 10 13   CREA 1.01 1.05 0.99   * 198* 284*   CA 8.6 8.3* 7.8*   MG 2.3 2.1  --      Recent Labs     05/23/21  0342   ALT 39      TBILI 0.4   TP 6.5   ALB 1.9*   GLOB 4.6*     CXR 5/23  IMPRESSION  No significant change. Subcutaneous emphysema and right chest tube.  No evidence  of pneumothorax.  ______________________________________________________________________  EXPECTED LENGTH OF STAY: 4d 9h  ACTUAL LENGTH OF STAY:          7                 Lalo Okeefe Mc, MD

## 2021-05-25 NOTE — PROGRESS NOTES
OCCUPATIONAL THERAPY EVALUATION: discharge  Patient: Wander Mackey (18 y.o. male)  Date: 5/25/2021  Primary Diagnosis: Lung abscess ([address] [J85.2]  Procedure(s) (LRB):  RIGHT VATS, DECORTICATION  (Right) 4 Days Post-Op   Precautions:        ASSESSMENT  Based on the objective data described below, the patient presents with decreased OOB tolerance, B UE ROM and therefore functional reach. However, patient modified independence ADLs. Education completed. Stating working on filling out disability paperwork, may need help from CM? Functional Outcome Measure: The patient scored Total: 90/100 on the Barthel Index outcome measure which is indicative of 10% impaired ability to care for basic self needs/dependency on others. PLAN :  Discharging skilled acute OT services. Recommendation for discharge: (in order for the patient to meet his/her long term goals)  No skilled occupational therapy/ follow up rehabilitation needs identified at this time. This discharge recommendation:  Has not yet been discussed the attending provider and/or case management    IF patient discharges home will need the following DME: none       SUBJECTIVE:   Patient stated This is my first time up.     OBJECTIVE DATA SUMMARY:   HISTORY:   History reviewed. No pertinent past medical history. No past surgical history on file. Expanded or extensive additional review of patient history:     Home Situation  Home Environment: Private residence    Hand dominance: Right    EXAMINATION OF PERFORMANCE DEFICITS:  Cognitive/Behavioral Status:  Neurologic State: Alert  Orientation Level: Oriented X4  Cognition: Appropriate decision making; Appropriate safety awareness; Appropriate for age attention/concentration; Follows commands  Perception: Appears intact  Perseveration: No perseveration noted       Skin: intact    Edema: intact    Hearing:   Auditory  Auditory Impairment: None    Vision/Perceptual:                           Acuity: Impaired near vision    Corrective Lenses: Reading glasses    Range of Motion:  Shoulder flexion 90*, with chest tube pain end range  AROM: Generally decreased, functional                         Strength:  -3/5 shoulder flexion  Strength: Generally decreased, functional                Coordination:  Coordination: Within functional limits  Fine Motor Skills-Upper: Left Intact; Right Intact    Gross Motor Skills-Upper: Left Intact; Right Intact    Tone & Sensation:    Tone: Normal                         Balance:       Functional Mobility and Transfers for ADLs:  Bed Mobility:       Transfers:       ADL Assessment:     Stating performing all ADLs, tailor sitting. ADL Intervention and task modifications:    Patient instructed and indicated understanding the benefits of maintaining activity tolerance, functional mobility, and independence with self care tasks during acute stay  to ensure safe return home and to baseline. Encouraged patient to increase frequency and duration OOB, be out of bed for all meals, perform daily ADLs (as approved by RN/MD regarding bathing etc), and performing functional mobility to/from bathroom. Instruction on benefits raising height of objects, continue tailor sitting etc. To prevent deep bending and therefore SOB. Therapeutic Exercise:  Patient instructed on benefits, technique PLB and demonstrated 5 reps 2 sets v. Gravity with modified independence. Instruction on how to increase reps, sets and weights, supine, sitting and then standing in prep for home. Patient declining need for handout. Written on white board OOB to chair 3x/day, walking 3x/day, exercise arms and legs daily.    Functional Measure:  Barthel Index:    Bathin  Bladder: 10  Bowels: 10  Groomin  Dressing: 10  Feeding: 10  Mobility: 10  Stairs: 10  Toilet Use: 10  Transfer (Bed to Chair and Back): 15  Total: 90/100        The Barthel ADL Index: Guidelines  1. The index should be used as a record of what a patient does, not as a record of what a patient could do. 2. The main aim is to establish degree of independence from any help, physical or verbal, however minor and for whatever reason. 3. The need for supervision renders the patient not independent. 4. A patient's performance should be established using the best available evidence. Asking the patient, friends/relatives and nurses are the usual sources, but direct observation and common sense are also important. However direct testing is not needed. 5. Usually the patient's performance over the preceding 24-48 hours is important, but occasionally longer periods will be relevant. 6. Middle categories imply that the patient supplies over 50 per cent of the effort. 7. Use of aids to be independent is allowed. Macie Harry., Barthel, D.W. (1505). Functional evaluation: the Barthel Index. [facility] (14)2. RORY Fernandez, John Marvin., Liudmila Nair., Diana, [address] [place] [address] (1999). Measuring the change indisability after inpatient rehabilitation; comparison of the responsiveness of the Barthel Index and Functional Alger Measure. Journal of Neurology, Neurosurgery, and Psychiatry, 66(4), 974-920. Amparo Jaramillo, N.J.A, ROBERT GomesJ.ANTONY, & Kristal Perez M.A. (2004.) Assessment of post-stroke quality of life in cost-effectiveness studies: The usefulness of the Barthel Index and the EuroQoL-5D.  Quality of Life Research, 13, 427-43     Pain Rating:  Chest tube insertion site     Activity Tolerance:   Good     sats 94% 3L   RR 23  After treatment patient left in no apparent distress:    Sitting in chair and Call bell within reach        Thank you for this referral.  Mickie De Souza  Time Calculation: 11 mins

## 2021-05-25 NOTE — PROGRESS NOTES
Spiritual Care Assessment/Progress Note  [facility]      NAME: Nancy Johnson      MRN: [id number]  AGE: 39 y.o. SEX: male  Mosque Affiliation: No preference   Language: English     5/25/2021     Total Time (in minutes): 10     [facility] through conversation with:         []Patient        [] Family    [] Friend(s)        Reason for Consult: Initial/Spiritual assessment, patient floor     Spiritual beliefs: (Please include comment if needed)     [] Identifies with a farooq tradition:         [] Supported by a farooq community:            [] Claims no spiritual orientation:           [] Seeking spiritual identity:                [] Adheres to an individual form of spirituality:           [x] Not able to assess:                           Identified resources for coping:      [] Prayer                               [] Music                  [] Guided Imagery     [] Family/friends                 [] Pet visits     [] Devotional reading                         [x] Unknown     [] Other:                                             Interventions offered during this visit: (See comments for more details)                Plan of Care:     [] Support spiritual and/or cultural needs    [] Support AMD and/or advance care planning process      [] Support grieving process   [] Coordinate Rites and/or Rituals    [] Coordination with community clergy   [] No spiritual needs identified at this time   [] Detailed Plan of Care below (See Comments)  [] Make referral to Music Therapy  [] Make referral to Pet Therapy     [] Make referral to Addiction services  [] Make referral to [facility]  [] Make referral to Spiritual Care Partner  [] No future visits requested        [x] Follow up upon further referrals     Comments:  visit for initial spiritual assessment. Staff with patient providing care. Please contact spiritual care for further referral or consult. Rev.  Perla Schmidt, Christiane, [facility], [address]  On license of [facility] paging service: [phone]

## 2021-05-25 NOTE — PROGRESS NOTES
Problem: Pain  Goal: *Control of Pain  Outcome: Progressing Towards Goal     Problem: Patient Education: Go to Patient Education Activity  Goal: Patient/Family Education  Outcome: Progressing Towards Goal     Problem: Risk for Spread of Infection  Goal: Prevent transmission of infectious organism to others  Description: Prevent the transmission of infectious organisms to other patients, staff members, and visitors. Outcome: Progressing Towards Goal     Problem: Patient Education:  Go to Education Activity  Goal: Patient/Family Education  Outcome: Progressing Towards Goal     Problem: Falls - Risk of  Goal: *Absence of Falls  Description: Document Walter Shove Fall Risk and appropriate interventions in the flowsheet. Outcome: Progressing Towards Goal  Note: Fall Risk Interventions:  Mobility Interventions: Patient to call before getting OOB         Medication Interventions: Teach patient to arise slowly, Patient to call before getting OOB         History of Falls Interventions: Vital signs minimum Q4HRs X 24 hrs (comment for end date)         Problem: Patient Education: Go to Patient Education Activity  Goal: Patient/Family Education  Outcome: Progressing Towards Goal     Problem: Diabetes Self-Management  Goal: *Disease process and treatment process  Description: Define diabetes and identify own type of diabetes; list 3 options for treating diabetes. Outcome: Progressing Towards Goal  Goal: *Incorporating nutritional management into lifestyle  Description: Describe effect of type, amount and timing of food on blood glucose; list 3 methods for planning meals. Outcome: Progressing Towards Goal  Goal: *Incorporating physical activity into lifestyle  Description: State effect of exercise on blood glucose levels.   Outcome: Progressing Towards Goal  Goal: *Developing strategies to promote health/change behavior  Description: Define the ABC's of diabetes; identify appropriate screenings, schedule and personal plan for screenings. Outcome: Progressing Towards Goal  Goal: *Using medications safely  Description: State effect of diabetes medications on diabetes; name diabetes medication taking, action and side effects. Outcome: Progressing Towards Goal  Goal: *Monitoring blood glucose, interpreting and using results  Description: Identify recommended blood glucose targets  and personal targets. Outcome: Progressing Towards Goal  Goal: *Prevention, detection, treatment of acute complications  Description: List symptoms of hyper- and hypoglycemia; describe how to treat low blood sugar and actions for lowering  high blood glucose level. Outcome: Progressing Towards Goal  Goal: *Prevention, detection and treatment of chronic complications  Description: Define the natural course of diabetes and describe the relationship of blood glucose levels to long term complications of diabetes.   Outcome: Progressing Towards Goal  Goal: *Developing strategies to address psychosocial issues  Description: Describe feelings about living with diabetes; identify support needed and support network  Outcome: Progressing Towards Goal  Goal: *Insulin pump training  Outcome: Progressing Towards Goal  Goal: *Sick day guidelines  Outcome: Progressing Towards Goal  Goal: *Patient Specific Goal (EDIT GOAL, INSERT TEXT)  Outcome: Progressing Towards Goal     Problem: Patient Education: Go to Patient Education Activity  Goal: Patient/Family Education  Outcome: Progressing Towards Goal     Problem: Breathing Pattern - Ineffective  Goal: *Absence of hypoxia  Outcome: Progressing Towards Goal  Goal: *Use of effective breathing techniques  Outcome: Progressing Towards Goal  Goal: *PALLIATIVE CARE:  Alleviation of Dyspnea  Outcome: Progressing Towards Goal     Problem: Patient Education: Go to Patient Education Activity  Goal: Patient/Family Education  Outcome: Progressing Towards Goal

## 2021-05-25 NOTE — PROGRESS NOTES
Thoracic Surgery Simple Progress Note    Admit Date: 2021  POD: 4 Days Post-Op      Procedure:  Procedure(s):  RIGHT VATS, DECORTICATION       Subjective:     Patient has complaints: No significant medical complaints    Review of Systems:  CARDIAC: positive for HTN, HLD  RESP: positive for CAP, empyema, lung abscess, +smoker  NEURO:  negative  INCISION: Clean, dry, and intact  EXT: Denies new swelling or pain in the legs or calves. Objective:     Blood pressure (!) 147/92, pulse (!) 109, temperature 98.6 °F (37 °C), resp. rate 20, height 5' 6\" (1.676 m), weight 195 lb 12.8 oz (88.8 kg), SpO2 93 %. Temp (24hrs), Av.4 °F (36.9 °C), Min:97.9 °F (36.6 °C), Max:99 °F (37.2 °C)        Hemodynamics    PAP    CO    CI    No intake/output data recorded.  1901 -  0700  In: 1400 [I.V.:1400]  Out: 3380 [Urine:3050]    EXAM:  GENERAL: VSS, afrible, alert and cooperative  HEART:  regular rate and rhythm  LUNG: diminished breath sounds R base, R CT x2 Angled tube w 330ml/24 hrs output   Straight tube w 145ml/24 hrs output  NEURO:  normal without focal findings  mental status, speech normal, A&O x3  INCISION: Clean, dry, and intact  EXTREMITIES:No evidence of DVT seen on physical exam.  GI/: Abd soft, nontender.  Voiding    Labs:  Recent Results (from the past 24 hour(s))   GLUCOSE, POC    Collection Time: 21 11:04 AM   Result Value Ref Range    Glucose (POC) 181 (H) 65 - 117 mg/dL    Performed by Valentina Parra, RANDOM    Collection Time: 21  4:30 PM   Result Value Ref Range    Vancomycin, random 10.9 UG/ML   GLUCOSE, POC    Collection Time: 21  5:43 PM   Result Value Ref Range    Glucose (POC) 320 (H) 65 - 117 mg/dL    Performed by Torrie See    GLUCOSE, POC    Collection Time: 21  9:29 PM   Result Value Ref Range    Glucose (POC) 251 (H) 65 - 117 mg/dL    Performed by Hannah Walsh    CBC WITH AUTOMATED DIFF    Collection Time: 21  3:26 AM   Result Value Ref Range    WBC 15.0 (H) 4.1 - 11.1 K/uL    RBC 2.82 (L) 4.10 - 5.70 M/uL    HGB 7.2 (L) 12.1 - 17.0 g/dL    HCT 24.2 (L) 36.6 - 50.3 %    MCV 85.8 80.0 - 99.0 FL    MCH 25.5 (L) 26.0 - 34.0 PG    MCHC 29.8 (L) 30.0 - 36.5 g/dL    RDW 15.0 (H) 11.5 - 14.5 %    PLATELET 644 (H) 831 - 400 K/uL    MPV 9.6 8.9 - 12.9 FL    NRBC 0.0 0  WBC    ABSOLUTE NRBC 0.00 0.00 - 0.01 K/uL    NEUTROPHILS 77 (H) 32 - 75 %    LYMPHOCYTES 13 12 - 49 %    MONOCYTES 7 5 - 13 %    EOSINOPHILS 1 0 - 7 %    BASOPHILS 0 0 - 1 %    IMMATURE GRANULOCYTES 2 (H) 0.0 - 0.5 %    ABS. NEUTROPHILS 11.5 (H) 1.8 - 8.0 K/UL    ABS. LYMPHOCYTES 1.9 0.8 - 3.5 K/UL    ABS. MONOCYTES 1.1 (H) 0.0 - 1.0 K/UL    ABS. EOSINOPHILS 0.2 0.0 - 0.4 K/UL    ABS. BASOPHILS 0.1 0.0 - 0.1 K/UL    ABS. IMM. GRANS. 0.3 (H) 0.00 - 0.04 K/UL    DF AUTOMATED     METABOLIC PANEL, BASIC    Collection Time: 05/25/21  3:26 AM   Result Value Ref Range    Sodium 141 136 - 145 mmol/L    Potassium 3.5 3.5 - 5.1 mmol/L    Chloride 108 97 - 108 mmol/L    CO2 27 21 - 32 mmol/L    Anion gap 6 5 - 15 mmol/L    Glucose 168 (H) 65 - 100 mg/dL    BUN 10 6 - 20 MG/DL    Creatinine 1.01 0.70 - 1.30 MG/DL    BUN/Creatinine ratio 10 (L) 12 - 20      GFR est AA >60 >60 ml/min/1.73m2    GFR est non-AA >60 >60 ml/min/1.73m2    Calcium 8.6 8.5 - 10.1 MG/DL   MAGNESIUM    Collection Time: 05/25/21  3:26 AM   Result Value Ref Range    Magnesium 2.3 1.6 - 2.4 mg/dL   GLUCOSE, POC    Collection Time: 05/25/21  6:30 AM   Result Value Ref Range    Glucose (POC) 172 (H) 65 - 117 mg/dL    Performed by Mar Bermudez        Assessment:   No evidence of DVT.   Active Problems:    Lung abscess ([address] (5/18/2021)      Cytology--pending  Plan/Recommendations:   Remove straight chest tube this AM  OOB as toelrated  PT  IS  IV antibiotics    HALINA Malone  5/25/2021

## 2021-05-25 NOTE — PROGRESS NOTES
Pharmacy consult note:  Day #7 of Vancomycin  Indication: CAP, right lung abscess/empyema   -S/p VATS   -2 chest tubes in place  Current regimen:  dose by level  Abx regimen:  vanc, Zosyn  ID Following ?: YES  Concomitant nephrotoxic drugs (requires more frequent monitoring): None  Frequency of BMP?: every day through     Recent Labs     21  0326 21  0358 21  0342   WBC 15.0* 14.0* 12.4*   CREA 1.01 1.05 0.99   BUN 10 10 13     Est CrCl: 96 ml/min; UO: 0.6 ml/kg/hr  Temp (24hrs), Av.4 °F (36.9 °C), Min:97.9 °F (36.6 °C), Max:99 °F (37.2 °C)    Cultures:    Resp viral panel: Neg - Final   Legionella:  Neg - Final   S.pneumo AG, UR/CSF: Neg - Final   Mycoplasma: IgG + Indeterminate, IGM = neg - final   AFB: NG - final   Quantiferon-TB: Indeterminate - final   Sputum:  group A strep, Light normal sumi: final  : aspergillus: +    AFB: NG - final   AFB: NG - final   AFB: NG - final   pleural fluid: STREPTOCOCCUS CONSTELLATUS- pend    Goal trough = 15 - 20 mcg/mL    Recent trough history (date/time/level/dose/action taken):   @ 1806 = 12.2 mcg/ml ~ 7 hrs post-dose [1250 mg q8h] dose increased   @ 1151 = 29.9 mcg/ml ~ 8 hrs post-dose [1500 mg q8h] hold   @ 0358 = 21.6 mcg/ml ~ 16 hrs post dose  Random  @ 1630 = 10.9 mcg/ml ~ 28.5 hrs post-dose    Plan: Begin 1500 mg IV q16h. Pharmacy to follow patient daily and order levels / make dose adjustments as appropriate.

## 2021-05-25 NOTE — PROGRESS NOTES
ID Progress Note  2021    Subjective:   Pain at chest tube site, sob improving, still coughing, less mucus production    Review of Systems:            Symptom Y/N Comments   Symptom Y/N Comments   Fever/Chills  n     Chest Pain n       Poor Appetite       Edema        Cough  y     Abdominal Pain        Sputum  y     Joint Pain        SOB/WARE  y     Pruritis/Rash        Nausea/vomit  n     Tolerating PT/OT        Diarrhea  n     Tolerating Diet        Constipation  n     Other           Could NOT obtain due to:       Objective:     Vitals:   Visit Vitals  /84 (BP 1 Location: Right upper arm, BP Patient Position: At rest)   Pulse 97   Temp 97.9 °F (36.6 °C)   Resp 14   Ht 5' 6\" (1.676 m) Comment: Per patient report 2021   Wt 88.8 kg (195 lb 12.8 oz)   SpO2 94%   BMI 31.60 kg/m²        Tmax:  Temp (24hrs), Av.4 °F (36.9 °C), Min:97.9 °F (36.6 °C), Max:99 °F (37.2 °C)      PHYSICAL EXAM:  General: Chronically ill appearing, WD, WN. Alert, cooperative, no acute distress    EENT:  EOMI. Anicteric sclerae. MMM  Resp:  Coarse breath sounds in apex with diminished breath sounds at bases, no wheezing or rales. No accessory muscle use  CV:  Regular  rhythm,  No edema, chest tube inplace  GI:  Soft, Non distended, Non tender. +Bowel sounds  Neurologic:  Alert and oriented X 3, normal speech,   Psych:   Good insight. Not anxious nor agitated  Skin:  No rashes.   No jaundice    Labs:   Lab Results   Component Value Date/Time    WBC 15.0 (H) 2021 03:26 AM    HGB 7.2 (L) 2021 03:26 AM    HCT 24.2 (L) 2021 03:26 AM    PLATELET 474 (H)  03:26 AM    MCV 85.8 2021 03:26 AM     Lab Results   Component Value Date/Time    Sodium 141 2021 03:26 AM    Potassium 3.5 2021 03:26 AM    Chloride 108 2021 03:26 AM    CO2 27 2021 03:26 AM    Anion gap 6 2021 03:26 AM    Glucose 168 (H) 2021 03:26 AM    BUN 10 2021 03:26 AM    Creatinine 1.01 2021 03:26 AM    BUN/Creatinine ratio 10 (L) 05/25/2021 03:26 AM    GFR est AA >60 05/25/2021 03:26 AM    GFR est non-AA >60 05/25/2021 03:26 AM    Calcium 8.6 05/25/2021 03:26 AM    Bilirubin, total 0.4 05/23/2021 03:42 AM    Alk. phosphatase 109 05/23/2021 03:42 AM    Protein, total 6.5 05/23/2021 03:42 AM    Albumin 1.9 (L) 05/23/2021 03:42 AM    Globulin 4.6 (H) 05/23/2021 03:42 AM    A-G Ratio 0.4 (L) 05/23/2021 03:42 AM    ALT (SGPT) 39 05/23/2021 03:42 AM     CT of chest (5/18):  1. Multiloculated right empyema. 2. Right lower lobe pneumonia. 3. Right upper lobe pneumonia with possible small lung abscess. 4. Emphysema. 5. Coronary artery disease.       Urin x (5/18) no growth  Assessment and Plan   CAP   right lung abscess/right empyema  S/p right video assisted thoracoscopy, full pulmonary decortication (5/21) by Dr. Emanuel Case  leukocytosis  - COVID 19; rapid & PCR negative    WBC 15, afebrile    Blood cx (5/19) no growth so far    Resp cx (5/18) Beta hemolytic group A strep    Fungal blood cx (5/19) no growth so far    Pathology from Right pleural peel (5/21) pending    Fluid cx (5/21) STREPTOCOCCUS CONSTELLATUS     procal 1.93 (5/18)    O2 @ 2L via n/c    HIV (-)   rule out TB; AFB (-) x 3   quantiferon (5/18) indeterminate, aspergillus galact Ag (+)   respiratory panel (-)   legionella, strep pneumo pending   mycoplasma IgG (+) with (-) IgM; indicates previous exposure     continue with IV zosyn and vancomycin   D/C doxycyline 5/4   will adjust ABX prn   fever work up if temp >= 100.4     Poorly controlled Type II DM  - A1C 13.2, management per primary team; continue with insulin therapy     CAD  - continue with ASA and statin        Belen Renteria NP

## 2021-05-26 ENCOUNTER — APPOINTMENT (OUTPATIENT)
Dept: GENERAL RADIOLOGY | Age: 46
DRG: 121 | End: 2021-05-26
Attending: PHYSICIAN ASSISTANT
Payer: COMMERCIAL

## 2021-05-26 LAB
ANION GAP SERPL CALC-SCNC: 4 MMOL/L (ref 5–15)
BASOPHILS # BLD: 0 K/UL (ref 0–0.1)
BASOPHILS NFR BLD: 0 % (ref 0–1)
BUN SERPL-MCNC: 9 MG/DL (ref 6–20)
BUN/CREAT SERPL: 8 (ref 12–20)
CALCIUM SERPL-MCNC: 8.9 MG/DL (ref 8.5–10.1)
CHLORIDE SERPL-SCNC: 106 MMOL/L (ref 97–108)
CO2 SERPL-SCNC: 29 MMOL/L (ref 21–32)
COMMENT, HOLDF: NORMAL
CREAT SERPL-MCNC: 1.08 MG/DL (ref 0.7–1.3)
DIFFERENTIAL METHOD BLD: ABNORMAL
EOSINOPHIL # BLD: 0.1 K/UL (ref 0–0.4)
EOSINOPHIL NFR BLD: 1 % (ref 0–7)
ERYTHROCYTE [DISTWIDTH] IN BLOOD BY AUTOMATED COUNT: 15.5 % (ref 11.5–14.5)
GLUCOSE BLD STRIP.AUTO-MCNC: 150 MG/DL (ref 65–117)
GLUCOSE BLD STRIP.AUTO-MCNC: 193 MG/DL (ref 65–117)
GLUCOSE BLD STRIP.AUTO-MCNC: 196 MG/DL (ref 65–117)
GLUCOSE BLD STRIP.AUTO-MCNC: 223 MG/DL (ref 65–117)
GLUCOSE SERPL-MCNC: 167 MG/DL (ref 65–100)
HCT VFR BLD AUTO: 22.3 % (ref 36.6–50.3)
HGB BLD-MCNC: 6.7 G/DL (ref 12.1–17)
HISTORY CHECKED?,CKHIST: NORMAL
IMM GRANULOCYTES # BLD AUTO: 0.1 K/UL (ref 0–0.04)
IMM GRANULOCYTES NFR BLD AUTO: 1 % (ref 0–0.5)
LYMPHOCYTES # BLD: 1.6 K/UL (ref 0.8–3.5)
LYMPHOCYTES NFR BLD: 11 % (ref 12–49)
MCH RBC QN AUTO: 26.2 PG (ref 26–34)
MCHC RBC AUTO-ENTMCNC: 30 G/DL (ref 30–36.5)
MCV RBC AUTO: 87.1 FL (ref 80–99)
MONOCYTES # BLD: 0.9 K/UL (ref 0–1)
MONOCYTES NFR BLD: 6 % (ref 5–13)
NEUTS SEG # BLD: 12.1 K/UL (ref 1.8–8)
NEUTS SEG NFR BLD: 81 % (ref 32–75)
NRBC # BLD: 0 K/UL (ref 0–0.01)
NRBC BLD-RTO: 0 PER 100 WBC
PLATELET # BLD AUTO: 475 K/UL (ref 150–400)
PMV BLD AUTO: 9.9 FL (ref 8.9–12.9)
POTASSIUM SERPL-SCNC: 3.5 MMOL/L (ref 3.5–5.1)
RBC # BLD AUTO: 2.56 M/UL (ref 4.1–5.7)
RBC MORPH BLD: ABNORMAL
RBC MORPH BLD: ABNORMAL
SAMPLES BEING HELD,HOLD: NORMAL
SERVICE CMNT-IMP: ABNORMAL
SODIUM SERPL-SCNC: 139 MMOL/L (ref 136–145)
WBC # BLD AUTO: 14.8 K/UL (ref 4.1–11.1)

## 2021-05-26 PROCEDURE — 74011636637 HC RX REV CODE- 636/637: Performed by: THORACIC SURGERY (CARDIOTHORACIC VASCULAR SURGERY)

## 2021-05-26 PROCEDURE — P9016 RBC LEUKOCYTES REDUCED: HCPCS

## 2021-05-26 PROCEDURE — 74011250636 HC RX REV CODE- 250/636: Performed by: INTERNAL MEDICINE

## 2021-05-26 PROCEDURE — 74011250636 HC RX REV CODE- 250/636: Performed by: THORACIC SURGERY (CARDIOTHORACIC VASCULAR SURGERY)

## 2021-05-26 PROCEDURE — 74011000258 HC RX REV CODE- 258: Performed by: HOSPITALIST

## 2021-05-26 PROCEDURE — 74011000258 HC RX REV CODE- 258: Performed by: INTERNAL MEDICINE

## 2021-05-26 PROCEDURE — P9047 ALBUMIN (HUMAN), 25%, 50ML: HCPCS | Performed by: INTERNAL MEDICINE

## 2021-05-26 PROCEDURE — 86923 COMPATIBILITY TEST ELECTRIC: CPT

## 2021-05-26 PROCEDURE — 99024 POSTOP FOLLOW-UP VISIT: CPT | Performed by: PHYSICIAN ASSISTANT

## 2021-05-26 PROCEDURE — 80048 BASIC METABOLIC PNL TOTAL CA: CPT

## 2021-05-26 PROCEDURE — 74011250636 HC RX REV CODE- 250/636: Performed by: HOSPITALIST

## 2021-05-26 PROCEDURE — 85025 COMPLETE CBC W/AUTO DIFF WBC: CPT

## 2021-05-26 PROCEDURE — 74011250637 HC RX REV CODE- 250/637: Performed by: HOSPITALIST

## 2021-05-26 PROCEDURE — 65660000000 HC RM CCU STEPDOWN

## 2021-05-26 PROCEDURE — 74011250637 HC RX REV CODE- 250/637: Performed by: THORACIC SURGERY (CARDIOTHORACIC VASCULAR SURGERY)

## 2021-05-26 PROCEDURE — 36430 TRANSFUSION BLD/BLD COMPNT: CPT

## 2021-05-26 PROCEDURE — 77010033678 HC OXYGEN DAILY

## 2021-05-26 PROCEDURE — 86901 BLOOD TYPING SEROLOGIC RH(D): CPT

## 2021-05-26 PROCEDURE — 36415 COLL VENOUS BLD VENIPUNCTURE: CPT

## 2021-05-26 PROCEDURE — 82962 GLUCOSE BLOOD TEST: CPT

## 2021-05-26 PROCEDURE — 71045 X-RAY EXAM CHEST 1 VIEW: CPT

## 2021-05-26 PROCEDURE — 94760 N-INVAS EAR/PLS OXIMETRY 1: CPT

## 2021-05-26 RX ORDER — SODIUM CHLORIDE 9 MG/ML
250 INJECTION, SOLUTION INTRAVENOUS AS NEEDED
Status: DISCONTINUED | OUTPATIENT
Start: 2021-05-26 | End: 2021-06-03 | Stop reason: HOSPADM

## 2021-05-26 RX ADMIN — SODIUM CHLORIDE 125 MG: 9 INJECTION, SOLUTION INTRAVENOUS at 15:26

## 2021-05-26 RX ADMIN — FERROUS SULFATE TAB 325 MG (65 MG ELEMENTAL FE) 325 MG: 325 (65 FE) TAB at 10:09

## 2021-05-26 RX ADMIN — ALBUMIN (HUMAN) 25 G: 0.25 INJECTION, SOLUTION INTRAVENOUS at 11:37

## 2021-05-26 RX ADMIN — METOPROLOL TARTRATE 12.5 MG: 25 TABLET, FILM COATED ORAL at 21:18

## 2021-05-26 RX ADMIN — Medication 10 ML: at 06:57

## 2021-05-26 RX ADMIN — DOCUSATE SODIUM 50 MG AND SENNOSIDES 8.6 MG 1 TABLET: 8.6; 5 TABLET, FILM COATED ORAL at 17:49

## 2021-05-26 RX ADMIN — ALBUMIN (HUMAN) 25 G: 0.25 INJECTION, SOLUTION INTRAVENOUS at 17:49

## 2021-05-26 RX ADMIN — DOCUSATE SODIUM 50 MG AND SENNOSIDES 8.6 MG 1 TABLET: 8.6; 5 TABLET, FILM COATED ORAL at 10:09

## 2021-05-26 RX ADMIN — HYDROMORPHONE HYDROCHLORIDE 1 MG: 1 INJECTION, SOLUTION INTRAMUSCULAR; INTRAVENOUS; SUBCUTANEOUS at 03:12

## 2021-05-26 RX ADMIN — FERROUS SULFATE TAB 325 MG (65 MG ELEMENTAL FE) 325 MG: 325 (65 FE) TAB at 17:49

## 2021-05-26 RX ADMIN — HYDROMORPHONE HYDROCHLORIDE 1 MG: 1 INJECTION, SOLUTION INTRAMUSCULAR; INTRAVENOUS; SUBCUTANEOUS at 21:18

## 2021-05-26 RX ADMIN — INSULIN GLARGINE 25 UNITS: 100 INJECTION, SOLUTION SUBCUTANEOUS at 21:31

## 2021-05-26 RX ADMIN — HYDROMORPHONE HYDROCHLORIDE 1 MG: 1 INJECTION, SOLUTION INTRAMUSCULAR; INTRAVENOUS; SUBCUTANEOUS at 15:30

## 2021-05-26 RX ADMIN — ASPIRIN 81 MG: 81 TABLET, CHEWABLE ORAL at 10:09

## 2021-05-26 RX ADMIN — INSULIN LISPRO 2 UNITS: 100 INJECTION, SOLUTION INTRAVENOUS; SUBCUTANEOUS at 17:49

## 2021-05-26 RX ADMIN — ALBUMIN (HUMAN) 25 G: 0.25 INJECTION, SOLUTION INTRAVENOUS at 06:30

## 2021-05-26 RX ADMIN — METOPROLOL TARTRATE 12.5 MG: 25 TABLET, FILM COATED ORAL at 10:08

## 2021-05-26 RX ADMIN — CEFTRIAXONE SODIUM 2 G: 2 INJECTION, POWDER, FOR SOLUTION INTRAMUSCULAR; INTRAVENOUS at 17:49

## 2021-05-26 RX ADMIN — INSULIN LISPRO 3 UNITS: 100 INJECTION, SOLUTION INTRAVENOUS; SUBCUTANEOUS at 11:37

## 2021-05-26 RX ADMIN — Medication 10 ML: at 21:18

## 2021-05-26 RX ADMIN — ATORVASTATIN CALCIUM 80 MG: 40 TABLET, FILM COATED ORAL at 10:09

## 2021-05-26 RX ADMIN — HYDROMORPHONE HYDROCHLORIDE 1 MG: 1 INJECTION, SOLUTION INTRAMUSCULAR; INTRAVENOUS; SUBCUTANEOUS at 10:35

## 2021-05-26 RX ADMIN — ENOXAPARIN SODIUM 40 MG: 40 INJECTION SUBCUTANEOUS at 10:08

## 2021-05-26 RX ADMIN — INSULIN LISPRO 2 UNITS: 100 INJECTION, SOLUTION INTRAVENOUS; SUBCUTANEOUS at 06:57

## 2021-05-26 NOTE — PROGRESS NOTES
ID Progress Note  2021    Subjective:   Pain at chest tube site, sob continue to improving, still coughing, but much less mucus production    Review of Systems:            Symptom Y/N Comments   Symptom Y/N Comments   Fever/Chills  n     Chest Pain n       Poor Appetite       Edema        Cough  y     Abdominal Pain        Sputum  y     Joint Pain        SOB/WARE  y     Pruritis/Rash        Nausea/vomit  n     Tolerating PT/OT        Diarrhea  n     Tolerating Diet        Constipation  n     Other           Could NOT obtain due to:       Objective:     Vitals:   Visit Vitals  BP (!) 145/89 (BP 1 Location: Right upper arm, BP Patient Position: At rest)   Pulse (!) 109   Temp 99.4 °F (37.4 °C)   Resp 21   Ht 5' 6\" (1.676 m)   Wt 83.6 kg (184 lb 6.4 oz)   SpO2 93%   BMI 29.76 kg/m²        Tmax:  Temp (24hrs), Av.1 °F (37.3 °C), Min:98.6 °F (37 °C), Max:99.4 °F (37.4 °C)      PHYSICAL EXAM:  General: Chronically ill appearing, WD, WN. Alert, cooperative, no acute distress    EENT:  EOMI. Anicteric sclerae. MMM  Resp:  Coarse breath sounds in apex with diminished breath sounds at bases, no wheezing or rales. No accessory muscle use  CV:  Regular  rhythm,  No edema, chest tube inplace  GI:  Soft, Non distended, Non tender. +Bowel sounds  Neurologic:  Alert and oriented X 3, normal speech,   Psych:   Good insight. Not anxious nor agitated  Skin:  No rashes.   No jaundice    Labs:   Lab Results   Component Value Date/Time    WBC 15.0 (H) 2021 03:26 AM    HGB 7.2 (L) 2021 03:26 AM    HCT 24.2 (L) 2021 03:26 AM    PLATELET 862 (H)  03:26 AM    MCV 85.8 2021 03:26 AM     Lab Results   Component Value Date/Time    Sodium 141 2021 03:26 AM    Potassium 3.5 2021 03:26 AM    Chloride 108 2021 03:26 AM    CO2 27 2021 03:26 AM    Anion gap 6 2021 03:26 AM    Glucose 168 (H) 2021 03:26 AM    BUN 10 2021 03:26 AM    Creatinine 1.01 2021 03:26 AM BUN/Creatinine ratio 10 (L) 05/25/2021 03:26 AM    GFR est AA >60 05/25/2021 03:26 AM    GFR est non-AA >60 05/25/2021 03:26 AM    Calcium 8.6 05/25/2021 03:26 AM    Bilirubin, total 0.4 05/23/2021 03:42 AM    Alk. phosphatase 109 05/23/2021 03:42 AM    Protein, total 6.5 05/23/2021 03:42 AM    Albumin 1.9 (L) 05/23/2021 03:42 AM    Globulin 4.6 (H) 05/23/2021 03:42 AM    A-G Ratio 0.4 (L) 05/23/2021 03:42 AM    ALT (SGPT) 39 05/23/2021 03:42 AM     CT of chest (5/18):  1. Multiloculated right empyema. 2. Right lower lobe pneumonia. 3. Right upper lobe pneumonia with possible small lung abscess. 4. Emphysema. 5. Coronary artery disease.       Urin x (5/18) no growth    Assessment and Plan   CAP   right lung abscess/right empyema  S/p right video assisted thoracoscopy, full pulmonary decortication (5/21) by Dr. Snow Roldan 19; rapid & PCR negative    WBC 15 (5/25), am lab ordered, afebrile    Blood cx (5/19) no growth so far    Resp cx (5/18) Beta hemolytic group A strep    Fungal blood cx (5/19) no growth so far    Pathology from Right pleural peel (5/21) pending    Fluid cx (5/21) STREPTOCOCCUS CONSTELLATUS     procal 1.93 (5/18)    O2 @ 2-3L via n/c    HIV (-)   rule out TB; AFB (-) x 3   quantiferon (5/18) indeterminate, aspergillus galact Ag (+)   respiratory panel (-)   legionella, strep pneumo pending   mycoplasma IgG (+) with (-) IgM; indicates previous exposure     IV zosyn and vancomycin; changed to IV rocephin 5/25     will adjust ABX prn   fever work up if temp >= 100.4     Poorly controlled Type II DM  - A1C 13.2, management per primary team; continue with insulin therapy     CAD  - continue with ASA and statin        Belen Oleary NP

## 2021-05-26 NOTE — PROGRESS NOTES
[facility]  Hospitalist Group                                                                                          Hospitalist Progress Note  Lalo Gonzales MD  Answering service: [phone] from in house phone     2021 : Date of admission    Date of Service:  2021  NAME:  Richardson Ansari  :  1975  MRN:  [id number]      Admission Summary:   Sarath Tsang is a 39 y. o. male with past medical history of CAD, tobacco use disorder, non-insulin-dependent type 2 diabetes, dyslipidemia, hypertension who presented to [facility] with multiple complaints. He was admitted to [facility] on 21.  Patient endorsed a 1 month history of recurrent fevers, chills, night sweats, malaise.  He was a cigarette smoker, down from 2 packs prior to his MI last year. [facility]  had no recent travel or sick contacts. Delilah Ivory was not up-to-date on Covid vaccination. Patient states that he has a chronic cough which really did not change much prior to presentation. He states that what changed over the past month was that he was having right sided chest pain. This was mostly on the right lateral thorax. Patient was found to have right complex loculated pleural effusion, possible lung abscess. Thoracic surgery was consulted and  took  patient for decortication of the right lung via VATS by thoracic surgery on May 21, 2021.    21: Patient has been taken off airborne respiratory isolation by Infectious Disease today as TB has been ruled out with 2 negative acid fast bacilli smears. Interval history / Subjective:     Patient seen and examined chart reviewed hgb low todaay 6.8 and per staff no visible blood loss noted. Patientt denies any CP/sob/n/v/d/ rectal bleed/hematuria. One of the chest tube was removed by CT surgery yesterday       Assessment & Plan:     1. Right empyema.    antibiotics with IV vancomycin, IV Zosyn, per ID stopped doxy  S/p Decortication of the right lung via VATS by thoracic surgery on May 21, 2021. Cont CT management per CTS  One CT removed 5/25/21 still has another CT in place    2. CAD. with stents x 2 to RCA in April, 2020. noncompliance to dual antiplatelet therapy, he developed instent stenosis, that was treated with PCI and furthermore,  had stent placed to the LAD on September 29, 2020. Cont home  meds    3. Hypoalbuminemia. contributing to tachycardia with reduced intravascular volume in the setting of decreased oncotic pressures. S/p IV albumin    4. Persistent tachycardia. Improving with volume. Cont BB    5.  DM ty 2   lantus + ISS monitor BG    6. Hypokalemia- repleted K    7. Chr anemia-   Low Iron /B12/folate ok, replace oral and IV Iron, f/u  stool occult blood.  Hgb 6.8 transfuse one unit PRBC today    Code status: full      Care Plan discussed with: Nurse  Anticipated Disposition: Home w/Family  Anticipated Discharge: Greater than 48 hours     Hospital Problems  Never Reviewed        Codes Class Noted POA    Lung abscess ([facility] [address] ICD-10-CM: J85.2  ICD-9-CM: 513.0  5/18/2021 Unknown              Current Facility-Administered Medications:     COVID-19 vac,Ad26-PF (CRYSTAL) injection 1 Dose, 1 Dose, IntraMUSCular, PRIOR TO DISCHARGE, Lalo Hope MD    cefTRIAXone (ROCEPHIN) 2 g in 0.9% sodium chloride (MBP/ADV) 50 mL MBP, 2 g, IntraVENous, Q24H, Florencio Ashby MD, Last Rate: 100 mL/hr at 05/25/21 1914, 2 g at 05/25/21 1914    ferrous sulfate tablet 325 mg, 1 Tablet, Oral, BID WITH MEALS, Lalo Hope MD, 325 mg at 05/25/21 1622    ferric gluconate (FERRLECIT) 125 mg in 0.9% sodium chloride 100 mL IVPB, 125 mg, IntraVENous, Q24H, Lalo Hope MD, Last Rate: 110 mL/hr at 05/25/21 1419, 125 mg at 05/25/21 1419    albumin human 25% (BUMINATE) solution 25 g, 25 g, IntraVENous, Q6H, Lm Centeno DO, 25 g at 05/26/21 0630    senna-docusate (PERICOLACE) 8.6-50 mg per tablet 1 Tablet, 1 Tablet, Oral, BID, Herminia, Llao SMILEY MD, 1 Tablet at 05/25/21 1914    aspirin chewable tablet 81 mg, 81 mg, Oral, DAILY, HerminiaLalo MD, 81 mg at 05/25/21 0849    metoprolol tartrate (LOPRESSOR) tablet 12.5 mg, 12.5 mg, Oral, BID, Herminia, Lalo SMILEY MD, 12.5 mg at 05/25/21 2149    diphenhydrAMINE (BENADRYL) capsule 25 mg, 25 mg, Oral, Q6H PRN, Erlin Bertrand MD, 25 mg at 05/21/21 0120    insulin glargine (LANTUS) injection 25 Units, 25 Units, SubCUTAneous, QHS, Erlin Bertrand MD, 25 Units at 05/25/21 2150    sodium chloride (NS) flush 5-40 mL, 5-40 mL, IntraVENous, Q8H, Erlin Bertrand MD, 10 mL at 05/26/21 0657    sodium chloride (NS) flush 5-40 mL, 5-40 mL, IntraVENous, PRN, Erlin Bertrand MD    oxyCODONE-acetaminophen (PERCOCET) 5-325 mg per tablet 2 Tablet, 2 Tablet, Oral, Q4H PRN, Erlin Bertrand MD, 2 Tablet at 05/24/21 2138    HYDROmorphone (PF) (DILAUDID) injection 1 mg, 1 mg, IntraVENous, Q3H PRN, Erlin Bertrand MD, 1 mg at 05/26/21 7473    naloxone (NARCAN) injection 0.4 mg, 0.4 mg, IntraVENous, PRN, Erlin Bertrand MD    enoxaparin (LOVENOX) injection 40 mg, 40 mg, SubCUTAneous, Q24H, Erlin Bertrand MD, 40 mg at 05/25/21 1228    insulin lispro (HUMALOG) injection, , SubCUTAneous, Prudence Maldonado MD, 2 Units at 05/26/21 6602    glucose chewable tablet 16 g, 4 Tablet, Oral, PRN, Erlin Bertrand MD    dextrose (D50W) injection syrg 12.5-25 g, 12.5-25 g, IntraVENous, PRN, Erlin Bertrand MD    glucagon [employer] [facility]) injection 1 mg, 1 mg, IntraMUSCular, PRN, Erlin Bertrand MD    atorvastatin (LIPITOR) tablet 80 mg, 80 mg, Oral, DAILY, Erlin Bertrand MD, 80 mg at 05/25/21 0849    nicotine (NICODERM CQ) 21 mg/24 hr patch 1 Patch, 1 Patch, TransDERmal, DAILY, Erlin Bertrand MD    polyethylene glycol (MIRALAX) packet 17 g, 17 g, Oral, DAILY PRN, Erlin Bertrand MD    promethazine (PHENERGAN) tablet 12.5 mg, 12.5 mg, Oral, Q6H PRN **OR** ondansetron (Rosalynd Herb) injection 4 mg, 4 mg, IntraVENous, Q6H PRN, Michelle Waters MD    melatonin tablet 3 mg, 3 mg, Oral, QHS PRN, Michelle Waters MD, 3 mg at 05/23/21 9189    Review of Systems:   A comprehensive review of systems was negative except for that written in the HPI. Vital Signs:     Patient Vitals for the past 24 hrs:  Patient Vitals for the past 24 hrs:   BP Temp Pulse Resp SpO2   05/22/21 1536 106/63 98.8 °F (37.1 °C) (!) 112 13 92 %   05/22/21 1118 118/67 98.2 °F (36.8 °C) (!) 104 18 93 %   05/22/21 0745 102/60 97.8 °F (36.6 °C) 100 17 92 %         Intake/Output Summary (Last 24 hours) at 5/26/2021 0900  Last data filed at 5/26/2021 0700  Gross per 24 hour   Intake 240 ml   Output 1990 ml   Net -1750 ml        Physical Examination:     Estimated body mass index is 29.76 kg/m² as calculated from the following:    Height as of this encounter: 5' 6\" (1.676 m). Weight as of this encounter: 83.6 kg (184 lb 6.4 oz). Estimated body mass index is 29.76 kg/m² as calculated from the following:    Height as of this encounter: 5' 6\" (1.676 m). Weight as of this encounter: 83.6 kg (184 lb 6.4 oz). General: In no acute distress. Well developed, well nourished. Head: Normocephalic, atraumatic. ENT:   Oral mucosa moist.  Neck: Supple. Heart: Regular rhythm. Tachycardic. Chest: Symmetrical expansion  Chest tubes x 1 extending from right lower lateral thorax. Abdomen: Soft, nontender. No abnormal distention. Extremities:  No edema, no cyanosis. Neurological:   Alert, oriented X3. Skin: No jaundice.            Data Review:       Labs:     Recent Labs     05/26/21  0400 05/25/21  0326   WBC 14.8* 15.0*   HGB 6.7* 7.2*   HCT 22.3* 24.2*   * 505*     Recent Labs     05/25/21  0326 05/24/21  0358    139   K 3.5 3.3*    105   CO2 27 29   BUN 10 10   CREA 1.01 1.05   * 198*   CA 8.6 8.3*   MG 2.3 2.1     No results for input(s): ALT, AP, TBIL, TBILI, TP, ALB, GLOB, GGT, AML, LPSE in the last 72 hours. No lab exists for component: SGOT, GPT, AMYP, HLPSE  CXR 5/25  IMPRESSION  Stable small right pleural effusion and right basilar opacity. No  pneumothorax following removal of one of the chest tubes.    ______________________________________________________________________  EXPECTED LENGTH OF STAY: 4d 9h  ACTUAL LENGTH OF STAY:          8                 Sami Schuyler Cabot, MD

## 2021-05-26 NOTE — PROGRESS NOTES
Bedside and Verbal shift change report given to [address] (oncoming nurse) by Vargas Parra RN (offgoing nurse).  Report included the following information SBAR, Kardex, Intake/Output, MAR, Recent Results and Cardiac Rhythm ST.

## 2021-05-26 NOTE — PROGRESS NOTES
Thoracic Surgery Simple Progress Note    Admit Date: 2021  POD: 5 Days Post-Op      Procedure:  Procedure(s):  RIGHT VATS, DECORTICATION       Subjective:   Patient has complaints: No significant medical complaints     Review of Systems:  CARDIAC: positive for HTN, HLD  RESP: positive for CAP, empyema, lung abscess, +smoker  NEURO:  negative  INCISION: Clean, dry, and intact  EXT: Denies new swelling or pain in the legs or calves. Objective:     Blood pressure (!) 144/86, pulse (!) 112, temperature 99.2 °F (37.3 °C), resp. rate 18, height 5' 6\" (1.676 m), weight 184 lb 6.4 oz (83.6 kg), SpO2 94 %. Temp (24hrs), Av.2 °F (37.3 °C), Min:99 °F (37.2 °C), Max:99.4 °F (37.4 °C)        Hemodynamics    PAP    CO    CI     0701 -  1900  In: -   Out: 500 [Urine:500]  1901 -  0700  In: 1640 [P.O.:240; I.V.:1400]  Out: 2860 [Urine:2600]    EXAM:  GENERAL: VSS, afrible, alert and cooperative  HEART:  regular rate and rhythm  LUNG: clear to auscultation bilaterally, 1 R CT w 260ml/24 hrs ouptut. CXR reviewed  O2 sats 94% on 3L via NC  NEURO:  normal without focal findings  mental status, speech normal, A&O x3  INCISION: Clean, dry, and intact  EXTREMITIES:No evidence of DVT seen on physical exam.  GI/: Abd soft, nontender with +BM.  Voiding    Labs:  Recent Results (from the past 24 hour(s))   GLUCOSE, POC    Collection Time: 21  4:01 PM   Result Value Ref Range    Glucose (POC) 265 (H) 65 - 117 mg/dL    Performed by Dari Ambriz  PCT    GLUCOSE, POC    Collection Time: 21  9:15 PM   Result Value Ref Range    Glucose (POC) 373 (H) 65 - 117 mg/dL    Performed by Felipe Ricketts 99, BASIC    Collection Time: 21  4:00 AM   Result Value Ref Range    Sodium 139 136 - 145 mmol/L    Potassium 3.5 3.5 - 5.1 mmol/L    Chloride 106 97 - 108 mmol/L    CO2 29 21 - 32 mmol/L    Anion gap 4 (L) 5 - 15 mmol/L    Glucose 167 (H) 65 - 100 mg/dL    BUN 9 6 - 20 MG/DL    Creatinine 1. 08 0.70 - 1.30 MG/DL    BUN/Creatinine ratio 8 (L) 12 - 20      GFR est AA >60 >60 ml/min/1.73m2    GFR est non-AA >60 >60 ml/min/1.73m2    Calcium 8.9 8.5 - 10.1 MG/DL   SAMPLES BEING HELD    Collection Time: 05/26/21  4:00 AM   Result Value Ref Range    SAMPLES BEING HELD 1LAV     COMMENT        Add-on orders for these samples will be processed based on acceptable specimen integrity and analyte stability, which may vary by analyte. CBC WITH AUTOMATED DIFF    Collection Time: 05/26/21  4:00 AM   Result Value Ref Range    WBC 14.8 (H) 4.1 - 11.1 K/uL    RBC 2.56 (L) 4.10 - 5.70 M/uL    HGB 6.7 (L) 12.1 - 17.0 g/dL    HCT 22.3 (L) 36.6 - 50.3 %    MCV 87.1 80.0 - 99.0 FL    MCH 26.2 26.0 - 34.0 PG    MCHC 30.0 30.0 - 36.5 g/dL    RDW 15.5 (H) 11.5 - 14.5 %    PLATELET 444 (H) 302 - 400 K/uL    MPV 9.9 8.9 - 12.9 FL    NRBC 0.0 0  WBC    ABSOLUTE NRBC 0.00 0.00 - 0.01 K/uL    NEUTROPHILS 81 (H) 32 - 75 %    LYMPHOCYTES 11 (L) 12 - 49 %    MONOCYTES 6 5 - 13 %    EOSINOPHILS 1 0 - 7 %    BASOPHILS 0 0 - 1 %    IMMATURE GRANULOCYTES 1 (H) 0.0 - 0.5 %    ABS. NEUTROPHILS 12.1 (H) 1.8 - 8.0 K/UL    ABS. LYMPHOCYTES 1.6 0.8 - 3.5 K/UL    ABS. MONOCYTES 0.9 0.0 - 1.0 K/UL    ABS. EOSINOPHILS 0.1 0.0 - 0.4 K/UL    ABS. BASOPHILS 0.0 0.0 - 0.1 K/UL    ABS. IMM. GRANS. 0.1 (H) 0.00 - 0.04 K/UL    DF SMEAR SCANNED      RBC COMMENTS ANISOCYTOSIS  1+        RBC COMMENTS HYPOCHROMIA  1+       GLUCOSE, POC    Collection Time: 05/26/21  6:22 AM   Result Value Ref Range    Glucose (POC) 193 (H) 65 - 117 mg/dL    Performed by Jeremy Kent, ALLOCATE    Collection Time: 05/26/21  9:15 AM   Result Value Ref Range    HISTORY CHECKED?  Historical check performed    TYPE & SCREEN    Collection Time: 05/26/21 10:15 AM   Result Value Ref Range    Crossmatch Expiration 05/29/2021,2359     ABO/Rh(D) O POSITIVE     Antibody screen NEG     Unit number [id number]     Blood component type [facility]     Unit division 00     Status of unit ALLOCATED     Crossmatch result Compatible    GLUCOSE, POC    Collection Time: 05/26/21 11:05 AM   Result Value Ref Range    Glucose (POC) 223 (H) 65 - 117 mg/dL    Performed by Michael Schwartz        Assessment:   No evidence of DVT.   Active Problems:    Lung abscess ([address] (5/18/2021)      Culture:FEW STREPTOCOCCUS CONSTELLATUS   Plan/Recommendations:   CT to waterseal  OOB, ambulatin  PT  IV antibiotics  CXR in AM  See orders    HALINA Patiño  5/26/2021

## 2021-05-26 NOTE — PROGRESS NOTES
Problem: Pain  Goal: *Control of Pain  Outcome: Progressing Towards Goal     Problem: Patient Education: Go to Patient Education Activity  Goal: Patient/Family Education  Outcome: Progressing Towards Goal     Problem: Risk for Spread of Infection  Goal: Prevent transmission of infectious organism to others  Description: Prevent the transmission of infectious organisms to other patients, staff members, and visitors. Outcome: Progressing Towards Goal     Problem: Patient Education:  Go to Education Activity  Goal: Patient/Family Education  Outcome: Progressing Towards Goal     Problem: Falls - Risk of  Goal: *Absence of Falls  Description: Document Lucrecia Copeland Fall Risk and appropriate interventions in the flowsheet. Outcome: Progressing Towards Goal  Note: Fall Risk Interventions:  Mobility Interventions: Patient to call before getting OOB, Communicate number of staff needed for ambulation/transfer, PT Consult for mobility concerns         Medication Interventions: Evaluate medications/consider consulting pharmacy         History of Falls Interventions: Evaluate medications/consider consulting pharmacy         Problem: Patient Education: Go to Patient Education Activity  Goal: Patient/Family Education  Outcome: Progressing Towards Goal     Problem: Diabetes Self-Management  Goal: *Disease process and treatment process  Description: Define diabetes and identify own type of diabetes; list 3 options for treating diabetes. Outcome: Progressing Towards Goal  Goal: *Incorporating nutritional management into lifestyle  Description: Describe effect of type, amount and timing of food on blood glucose; list 3 methods for planning meals. Outcome: Progressing Towards Goal  Goal: *Incorporating physical activity into lifestyle  Description: State effect of exercise on blood glucose levels.   Outcome: Progressing Towards Goal  Goal: *Developing strategies to promote health/change behavior  Description: Define the ABC's of diabetes; identify appropriate screenings, schedule and personal plan for screenings. Outcome: Progressing Towards Goal  Goal: *Using medications safely  Description: State effect of diabetes medications on diabetes; name diabetes medication taking, action and side effects. Outcome: Progressing Towards Goal  Goal: *Monitoring blood glucose, interpreting and using results  Description: Identify recommended blood glucose targets  and personal targets. Outcome: Progressing Towards Goal  Goal: *Prevention, detection, treatment of acute complications  Description: List symptoms of hyper- and hypoglycemia; describe how to treat low blood sugar and actions for lowering  high blood glucose level. Outcome: Progressing Towards Goal  Goal: *Prevention, detection and treatment of chronic complications  Description: Define the natural course of diabetes and describe the relationship of blood glucose levels to long term complications of diabetes.   Outcome: Progressing Towards Goal  Goal: *Developing strategies to address psychosocial issues  Description: Describe feelings about living with diabetes; identify support needed and support network  Outcome: Progressing Towards Goal  Goal: *Insulin pump training  Outcome: Progressing Towards Goal  Goal: *Sick day guidelines  Outcome: Progressing Towards Goal  Goal: *Patient Specific Goal (EDIT GOAL, INSERT TEXT)  Outcome: Progressing Towards Goal     Problem: Patient Education: Go to Patient Education Activity  Goal: Patient/Family Education  Outcome: Progressing Towards Goal     Problem: Breathing Pattern - Ineffective  Goal: *Absence of hypoxia  Outcome: Progressing Towards Goal  Goal: *Use of effective breathing techniques  Outcome: Progressing Towards Goal  Goal: *PALLIATIVE CARE:  Alleviation of Dyspnea  Outcome: Progressing Towards Goal     Problem: Patient Education: Go to Patient Education Activity  Goal: Patient/Family Education  Outcome: Progressing Towards Goal

## 2021-05-27 ENCOUNTER — APPOINTMENT (OUTPATIENT)
Dept: NON INVASIVE DIAGNOSTICS | Age: 46
DRG: 121 | End: 2021-05-27
Attending: FAMILY MEDICINE
Payer: COMMERCIAL

## 2021-05-27 ENCOUNTER — APPOINTMENT (OUTPATIENT)
Dept: CT IMAGING | Age: 46
DRG: 121 | End: 2021-05-27
Attending: FAMILY MEDICINE
Payer: COMMERCIAL

## 2021-05-27 ENCOUNTER — APPOINTMENT (OUTPATIENT)
Dept: GENERAL RADIOLOGY | Age: 46
DRG: 121 | End: 2021-05-27
Attending: PHYSICIAN ASSISTANT
Payer: COMMERCIAL

## 2021-05-27 ENCOUNTER — APPOINTMENT (OUTPATIENT)
Dept: GENERAL RADIOLOGY | Age: 46
DRG: 121 | End: 2021-05-27
Attending: THORACIC SURGERY (CARDIOTHORACIC VASCULAR SURGERY)
Payer: COMMERCIAL

## 2021-05-27 LAB
ABO + RH BLD: NORMAL
ANION GAP SERPL CALC-SCNC: 9 MMOL/L (ref 5–15)
BASOPHILS # BLD: 0.1 K/UL (ref 0–0.1)
BASOPHILS NFR BLD: 0 % (ref 0–1)
BLD PROD TYP BPU: NORMAL
BLOOD GROUP ANTIBODIES SERPL: NORMAL
BPU ID: NORMAL
BUN SERPL-MCNC: 11 MG/DL (ref 6–20)
BUN/CREAT SERPL: 12 (ref 12–20)
CALCIUM SERPL-MCNC: 9.5 MG/DL (ref 8.5–10.1)
CHLORIDE SERPL-SCNC: 104 MMOL/L (ref 97–108)
CO2 SERPL-SCNC: 24 MMOL/L (ref 21–32)
CREAT SERPL-MCNC: 0.92 MG/DL (ref 0.7–1.3)
CROSSMATCH RESULT,%XM: NORMAL
DIFFERENTIAL METHOD BLD: ABNORMAL
EOSINOPHIL # BLD: 0.1 K/UL (ref 0–0.4)
EOSINOPHIL NFR BLD: 1 % (ref 0–7)
ERYTHROCYTE [DISTWIDTH] IN BLOOD BY AUTOMATED COUNT: 15 % (ref 11.5–14.5)
GLUCOSE BLD STRIP.AUTO-MCNC: 157 MG/DL (ref 65–117)
GLUCOSE BLD STRIP.AUTO-MCNC: 202 MG/DL (ref 65–117)
GLUCOSE BLD STRIP.AUTO-MCNC: 264 MG/DL (ref 65–117)
GLUCOSE BLD STRIP.AUTO-MCNC: 269 MG/DL (ref 65–117)
GLUCOSE SERPL-MCNC: 151 MG/DL (ref 65–100)
HCT VFR BLD AUTO: 25.1 % (ref 36.6–50.3)
HGB BLD-MCNC: 7.7 G/DL (ref 12.1–17)
IMM GRANULOCYTES # BLD AUTO: 0.1 K/UL (ref 0–0.04)
IMM GRANULOCYTES NFR BLD AUTO: 1 % (ref 0–0.5)
LYMPHOCYTES # BLD: 1.6 K/UL (ref 0.8–3.5)
LYMPHOCYTES NFR BLD: 11 % (ref 12–49)
MCH RBC QN AUTO: 26.1 PG (ref 26–34)
MCHC RBC AUTO-ENTMCNC: 30.7 G/DL (ref 30–36.5)
MCV RBC AUTO: 85.1 FL (ref 80–99)
MONOCYTES # BLD: 1 K/UL (ref 0–1)
MONOCYTES NFR BLD: 7 % (ref 5–13)
NEUTS SEG # BLD: 11.1 K/UL (ref 1.8–8)
NEUTS SEG NFR BLD: 80 % (ref 32–75)
NRBC # BLD: 0 K/UL (ref 0–0.01)
NRBC BLD-RTO: 0 PER 100 WBC
PLATELET # BLD AUTO: 511 K/UL (ref 150–400)
PMV BLD AUTO: 9.8 FL (ref 8.9–12.9)
POTASSIUM SERPL-SCNC: 3.5 MMOL/L (ref 3.5–5.1)
RBC # BLD AUTO: 2.95 M/UL (ref 4.1–5.7)
SERVICE CMNT-IMP: ABNORMAL
SODIUM SERPL-SCNC: 137 MMOL/L (ref 136–145)
SPECIMEN EXP DATE BLD: NORMAL
STATUS OF UNIT,%ST: NORMAL
UNIT DIVISION, %UDIV: 0
WBC # BLD AUTO: 14 K/UL (ref 4.1–11.1)

## 2021-05-27 PROCEDURE — 74011000258 HC RX REV CODE- 258: Performed by: INTERNAL MEDICINE

## 2021-05-27 PROCEDURE — 93306 TTE W/DOPPLER COMPLETE: CPT

## 2021-05-27 PROCEDURE — 74011250636 HC RX REV CODE- 250/636: Performed by: INTERNAL MEDICINE

## 2021-05-27 PROCEDURE — 74011000636 HC RX REV CODE- 636: Performed by: RADIOLOGY

## 2021-05-27 PROCEDURE — 71275 CT ANGIOGRAPHY CHEST: CPT

## 2021-05-27 PROCEDURE — 71045 X-RAY EXAM CHEST 1 VIEW: CPT

## 2021-05-27 PROCEDURE — 94760 N-INVAS EAR/PLS OXIMETRY 1: CPT

## 2021-05-27 PROCEDURE — 77010033678 HC OXYGEN DAILY

## 2021-05-27 PROCEDURE — 74011250637 HC RX REV CODE- 250/637: Performed by: HOSPITALIST

## 2021-05-27 PROCEDURE — 85025 COMPLETE CBC W/AUTO DIFF WBC: CPT

## 2021-05-27 PROCEDURE — 80048 BASIC METABOLIC PNL TOTAL CA: CPT

## 2021-05-27 PROCEDURE — 65660000000 HC RM CCU STEPDOWN

## 2021-05-27 PROCEDURE — 74011250637 HC RX REV CODE- 250/637: Performed by: THORACIC SURGERY (CARDIOTHORACIC VASCULAR SURGERY)

## 2021-05-27 PROCEDURE — 82962 GLUCOSE BLOOD TEST: CPT

## 2021-05-27 PROCEDURE — 74011636637 HC RX REV CODE- 636/637: Performed by: THORACIC SURGERY (CARDIOTHORACIC VASCULAR SURGERY)

## 2021-05-27 PROCEDURE — 74011250636 HC RX REV CODE- 250/636: Performed by: THORACIC SURGERY (CARDIOTHORACIC VASCULAR SURGERY)

## 2021-05-27 PROCEDURE — P9047 ALBUMIN (HUMAN), 25%, 50ML: HCPCS | Performed by: INTERNAL MEDICINE

## 2021-05-27 PROCEDURE — 36415 COLL VENOUS BLD VENIPUNCTURE: CPT

## 2021-05-27 PROCEDURE — 74011636637 HC RX REV CODE- 636/637: Performed by: NURSE PRACTITIONER

## 2021-05-27 PROCEDURE — 99221 1ST HOSP IP/OBS SF/LOW 40: CPT | Performed by: THORACIC SURGERY (CARDIOTHORACIC VASCULAR SURGERY)

## 2021-05-27 RX ORDER — INSULIN LISPRO 100 [IU]/ML
INJECTION, SOLUTION INTRAVENOUS; SUBCUTANEOUS
Status: DISCONTINUED | OUTPATIENT
Start: 2021-05-27 | End: 2021-06-03 | Stop reason: HOSPADM

## 2021-05-27 RX ADMIN — ALBUMIN (HUMAN) 25 G: 0.25 INJECTION, SOLUTION INTRAVENOUS at 13:45

## 2021-05-27 RX ADMIN — OXYCODONE HYDROCHLORIDE AND ACETAMINOPHEN 2 TABLET: 5; 325 TABLET ORAL at 10:30

## 2021-05-27 RX ADMIN — HYDROMORPHONE HYDROCHLORIDE 1 MG: 1 INJECTION, SOLUTION INTRAMUSCULAR; INTRAVENOUS; SUBCUTANEOUS at 22:17

## 2021-05-27 RX ADMIN — HYDROMORPHONE HYDROCHLORIDE 1 MG: 1 INJECTION, SOLUTION INTRAMUSCULAR; INTRAVENOUS; SUBCUTANEOUS at 06:02

## 2021-05-27 RX ADMIN — INSULIN GLARGINE 25 UNITS: 100 INJECTION, SOLUTION SUBCUTANEOUS at 22:10

## 2021-05-27 RX ADMIN — HYDROMORPHONE HYDROCHLORIDE 1 MG: 1 INJECTION, SOLUTION INTRAMUSCULAR; INTRAVENOUS; SUBCUTANEOUS at 16:06

## 2021-05-27 RX ADMIN — IOPAMIDOL 75 ML: 755 INJECTION, SOLUTION INTRAVENOUS at 20:13

## 2021-05-27 RX ADMIN — DOCUSATE SODIUM 50 MG AND SENNOSIDES 8.6 MG 1 TABLET: 8.6; 5 TABLET, FILM COATED ORAL at 10:25

## 2021-05-27 RX ADMIN — ALBUMIN (HUMAN) 25 G: 0.25 INJECTION, SOLUTION INTRAVENOUS at 23:09

## 2021-05-27 RX ADMIN — Medication 10 ML: at 22:18

## 2021-05-27 RX ADMIN — Medication 10 ML: at 13:46

## 2021-05-27 RX ADMIN — DOCUSATE SODIUM 50 MG AND SENNOSIDES 8.6 MG 1 TABLET: 8.6; 5 TABLET, FILM COATED ORAL at 17:25

## 2021-05-27 RX ADMIN — ALBUMIN (HUMAN) 25 G: 0.25 INJECTION, SOLUTION INTRAVENOUS at 00:13

## 2021-05-27 RX ADMIN — Medication 10 ML: at 05:23

## 2021-05-27 RX ADMIN — FERROUS SULFATE TAB 325 MG (65 MG ELEMENTAL FE) 325 MG: 325 (65 FE) TAB at 10:30

## 2021-05-27 RX ADMIN — INSULIN LISPRO 3 UNITS: 100 INJECTION, SOLUTION INTRAVENOUS; SUBCUTANEOUS at 13:45

## 2021-05-27 RX ADMIN — INSULIN LISPRO 2 UNITS: 100 INJECTION, SOLUTION INTRAVENOUS; SUBCUTANEOUS at 06:44

## 2021-05-27 RX ADMIN — METOPROLOL TARTRATE 12.5 MG: 25 TABLET, FILM COATED ORAL at 10:26

## 2021-05-27 RX ADMIN — CEFTRIAXONE SODIUM 2 G: 2 INJECTION, POWDER, FOR SOLUTION INTRAMUSCULAR; INTRAVENOUS at 17:24

## 2021-05-27 RX ADMIN — ALBUMIN (HUMAN) 25 G: 0.25 INJECTION, SOLUTION INTRAVENOUS at 17:25

## 2021-05-27 RX ADMIN — ATORVASTATIN CALCIUM 80 MG: 40 TABLET, FILM COATED ORAL at 10:26

## 2021-05-27 RX ADMIN — FERROUS SULFATE TAB 325 MG (65 MG ELEMENTAL FE) 325 MG: 325 (65 FE) TAB at 16:06

## 2021-05-27 RX ADMIN — ASPIRIN 81 MG: 81 TABLET, CHEWABLE ORAL at 10:26

## 2021-05-27 RX ADMIN — INSULIN LISPRO 3 UNITS: 100 INJECTION, SOLUTION INTRAVENOUS; SUBCUTANEOUS at 23:08

## 2021-05-27 RX ADMIN — HYDROMORPHONE HYDROCHLORIDE 1 MG: 1 INJECTION, SOLUTION INTRAMUSCULAR; INTRAVENOUS; SUBCUTANEOUS at 01:02

## 2021-05-27 RX ADMIN — ENOXAPARIN SODIUM 40 MG: 40 INJECTION SUBCUTANEOUS at 10:26

## 2021-05-27 RX ADMIN — METOPROLOL TARTRATE 12.5 MG: 25 TABLET, FILM COATED ORAL at 22:10

## 2021-05-27 RX ADMIN — ALBUMIN (HUMAN) 25 G: 0.25 INJECTION, SOLUTION INTRAVENOUS at 05:24

## 2021-05-27 RX ADMIN — INSULIN LISPRO 5 UNITS: 100 INJECTION, SOLUTION INTRAVENOUS; SUBCUTANEOUS at 17:25

## 2021-05-27 NOTE — PROGRESS NOTES
Transitions of Care: 9% risk of re-admission      -TBD, continue PT/OT- remains on IV antibiotics, changed to Rocephin on 5/25- if long-term IV antibiotics are needed, will need 24 hours notice prior to discharge- [place]   -[address] transportation    -Wean Oxygen       The CM noted that Thoracic will be removing final chest tube today- continue PT/OT, patient will need to be weaned off Oxygen [id number]. CM also noted patient remains on IV antibiotics, on IV rocephin- TBD the duration of antibiotics. If patient requires long-term IV antibiotics, will need notice prior to discharge to secure services. [place] The CM will follow for transitions of care.      PUSHPA Andujar

## 2021-05-27 NOTE — PROGRESS NOTES
ID Progress Note  2021      Low grade fever yesterday. No dyspnea, abdominal pain, headache or sore throat. Chest tubes taken out. ROS: No anaphylaxis, seizures, syncope, hematemesis, hematochezia       Objective:     Vitals:   Visit Vitals  BP (!) 144/87   Pulse (!) 107   Temp 98.8 °F (37.1 °C)   Resp 17   Ht 5' 6\" (1.676 m)   Wt 81.2 kg (179 lb)   SpO2 93%   BMI 28.89 kg/m²        Tmax:  Temp (24hrs), Av.3 °F (37.4 °C), Min:98.5 °F (36.9 °C), Max:100.1 °F (37.8 °C)      PHYSICAL EXAM:  Not in distress  Pink conjunctivae, anicteric sclerae  No cervical lymphdenopathy   Lung clear, no rales, wheezes or rhonchi   Heart: s1, s2, RRR, no murmurs rubs or clicks  Abdomen: soft nontender, no guarding or rebound  Knees not warm or tender  Speech fluent     Labs:   Lab Results   Component Value Date/Time    WBC 14.0 (H) 2021 05:41 AM    HGB 7.7 (L) 2021 05:41 AM    HCT 25.1 (L) 2021 05:41 AM    PLATELET 768 (H)  05:41 AM    MCV 85.1 2021 05:41 AM     Lab Results   Component Value Date/Time    Sodium 137 2021 05:41 AM    Potassium 3.5 2021 05:41 AM    Chloride 104 2021 05:41 AM    CO2 24 2021 05:41 AM    Anion gap 9 2021 05:41 AM    Glucose 151 (H) 2021 05:41 AM    BUN 11 2021 05:41 AM    Creatinine 0.92 2021 05:41 AM    BUN/Creatinine ratio 12 2021 05:41 AM    GFR est AA >60 2021 05:41 AM    GFR est non-AA >60 2021 05:41 AM    Calcium 9.5 2021 05:41 AM    Bilirubin, total 0.4 2021 03:42 AM    Alk. phosphatase 109 2021 03:42 AM    Protein, total 6.5 2021 03:42 AM    Albumin 1.9 (L) 2021 03:42 AM    Globulin 4.6 (H) 2021 03:42 AM    A-G Ratio 0.4 (L) 2021 03:42 AM    ALT (SGPT) 39 2021 03:42 AM     CT of chest ():  1. Multiloculated right empyema. 2. Right lower lobe pneumonia. 3. Right upper lobe pneumonia with possible small lung abscess. 4. Emphysema.   5. Coronary artery disease. Urin x (5/18) no growth    Assessment and Plan   CAP   right lung abscess/right empyema  S/p right video assisted thoracoscopy, full pulmonary decortication (5/21) by Dr. Jose Raul Smith  leukocytosis  - COVID 19; rapid & PCR negative    Blood cx (5/19) no growth    Resp cx (5/18) Beta hemolytic group A strep    Fungal blood cx (5/19) no growth so far    Fluid cx (5/21) STREPTOCOCCUS CONSTELLATUS     procal 1.93 (5/18)    O2 @ 2-3L via n/c    HIV (-)   rule out TB; AFB (-) x 3   quantiferon (5/18) indeterminate, aspergillus galact Ag (+)   respiratory panel (-)   mycoplasma IgG (+) with (-) IgM; indicates previous exposure    Continue  IV rocephin. Will need therapy for 4 weeks.      Poorly controlled Type II DM  - A1C 13.2, management per primary team; continue with insulin therapy     CAD  - continue with ASA and statin        Lily Barclay MD

## 2021-05-27 NOTE — PROGRESS NOTES
[address] [facility]  Hospitalist Group                                                                                          Hospitalist Progress Note  Eugenio Dubon MD  Answering service: [phone] from in house phone     2021 : Date of admission    Date of Service:  2021  NAME:  Angie Enriquez  :  1975  MRN:  [id number]      Admission Summary:   Wes Caller a 39 y. o. male with past medical history of CAD, tobacco use disorder, non-insulin-dependent type 2 diabetes, dyslipidemia, hypertension who presented to [facility] with multiple complaints. He was admitted to [facility] on 21.  Patient endorsed a 1 month history of recurrent fevers, chills, night sweats, malaise.  He was a cigarette smoker, down from 2 packs prior to his MI last year. Destinee Tucker  had no recent travel or sick contacts. Bernida Miners was not up-to-date on Covid vaccination. Patient states that he has a chronic cough which really did not change much prior to presentation. He states that what changed over the past month was that he was having right sided chest pain. This was mostly on the right lateral thorax. Patient was found to have right complex loculated pleural effusion, possible lung abscess. Thoracic surgery was consulted and  took  patient for decortication of the right lung via VATS by thoracic surgery on May 21, 2021.    21: Patient has been taken off airborne respiratory isolation by Infectious Disease today as TB has been ruled out with 2 negative acid fast bacilli smears. Interval history / Subjective:     Patient resting in bed, reports that he feels much better, second chest tube removed today       Assessment & Plan:     1. Right empyema. antibiotics with IV vancomycin, IV Zosyn, per ID stopped doxy  S/p Decortication of the right lung via VATS by thoracic surgery on May 21, 2021.    Cont CT management per CTS  One CT removed 21, final chest tube removed today    2. CAD. with stents x 2 to RCA in April, 2020. noncompliance to dual antiplatelet therapy, he developed instent stenosis, that was treated with PCI and furthermore,  had stent placed to the LAD on September 29, 2020. Cont home  meds    3. Hypoalbuminemia. contributing to tachycardia with reduced intravascular volume in the setting of decreased oncotic pressures. S/p IV albumin    4. Persistent tachycardia. Improving with volume. Cont BB, will obtain CT of the chest and echocardiogram    5. DM ty 2   lantus + ISS monitor BG    6. Hypokalemia- repleted K    7. Chr anemia-   Low Iron /B12/folate ok, replace oral and IV Iron, f/u  stool occult blood.  Hgb 6.8 transfuse one unit PRBC today    Code status: full      Care Plan discussed with: Nurse  Anticipated Disposition: Home w/Family  Anticipated Discharge: Greater than 48 hours     Hospital Problems  Never Reviewed        Codes Class Noted POA    Lung abscess ([facility] [address] ICD-10-CM: J85.2  ICD-9-CM: 513.0  5/18/2021 Unknown              Current Facility-Administered Medications:     [START ON 5/28/2021] pantoprazole (PROTONIX) 40 mg in 0.9% sodium chloride 10 mL injection, 40 mg, IntraVENous, DAILY, Nathan Crow MD    0.9% sodium chloride infusion 250 mL, 250 mL, IntraVENous, PRN, Lalo Hope MD    COVID-19 vac,Ad26-PF ([email or web address]) injection 1 Dose, 1 Dose, IntraMUSCular, PRIOR TO DISCHARGE, Lalo Hope MD    cefTRIAXone (ROCEPHIN) 2 g in 0.9% sodium chloride (MBP/ADV) 50 mL MBP, 2 g, IntraVENous, Q24H, Norma Márquez MD, Last Rate: 100 mL/hr at 05/26/21 1749, 2 g at 05/26/21 1749    ferrous sulfate tablet 325 mg, 1 Tablet, Oral, BID WITH MEALS, Lalo Hope MD, 325 mg at 05/27/21 1030    albumin human 25% (BUMINATE) solution 25 g, 25 g, IntraVENous, Q6H, Lm Centeno DO, 25 g at 05/27/21 0524    senna-docusate (PERICOLACE) 8.6-50 mg per tablet 1 Tablet, 1 Tablet, Oral, BID, Herminia, Lalo SMILEY MD, 1 Tablet at 05/27/21 1025    aspirin chewable tablet 81 mg, 81 mg, Oral, DAILY, Lalo Hope MD, 81 mg at 05/27/21 1026    metoprolol tartrate (LOPRESSOR) tablet 12.5 mg, 12.5 mg, Oral, BID, HerminiaLalo MD, 12.5 mg at 05/27/21 1026    diphenhydrAMINE (BENADRYL) capsule 25 mg, 25 mg, Oral, Q6H PRN, Dolores Pollard MD, 25 mg at 05/21/21 0120    insulin glargine (LANTUS) injection 25 Units, 25 Units, SubCUTAneous, QHS, Dolores Pollard MD, 25 Units at 05/26/21 2131    sodium chloride (NS) flush 5-40 mL, 5-40 mL, IntraVENous, Q8H, Dolores Pollard MD, 10 mL at 05/27/21 0523    sodium chloride (NS) flush 5-40 mL, 5-40 mL, IntraVENous, PRN, Dolores Pollard MD    oxyCODONE-acetaminophen (PERCOCET) 5-325 mg per tablet 2 Tablet, 2 Tablet, Oral, Q4H PRN, Dolores Pollard MD, 2 Tablet at 05/27/21 1030    HYDROmorphone (PF) (DILAUDID) injection 1 mg, 1 mg, IntraVENous, Q3H PRN, Dolores Pollard MD, 1 mg at 05/27/21 0602    naloxone (NARCAN) injection 0.4 mg, 0.4 mg, IntraVENous, PRN, Dolores Pollard MD    enoxaparin (LOVENOX) injection 40 mg, 40 mg, SubCUTAneous, Q24H, Dolores Pollard MD, 40 mg at 05/27/21 1026    insulin lispro (HUMALOG) injection, , SubCUTAneous, TIDAC, Dolores Pollard MD, 2 Units at 05/27/21 4664    glucose chewable tablet 16 g, 4 Tablet, Oral, PRN, Dolores Pollard MD    dextrose (D50W) injection syrg 12.5-25 g, 12.5-25 g, IntraVENous, PRN, Dolores Pollard MD    glucagon [facility]) injection 1 mg, 1 mg, IntraMUSCular, PRN, Dolores Pollard MD    atorvastatin (LIPITOR) tablet 80 mg, 80 mg, Oral, DAILY, Dolores Pollard MD, 80 mg at 05/27/21 1026    nicotine (NICODERM CQ) 21 mg/24 hr patch 1 Patch, 1 Patch, TransDERmal, DAILY, Dolores Pollard MD    polyethylene glycol (MIRALAX) packet 17 g, 17 g, Oral, DAILY PRN, Dolores Pollard MD    promethazine (PHENERGAN) tablet 12.5 mg, 12.5 mg, Oral, Q6H PRN **OR** ondansetron (ZOFRAN) injection 4 mg, 4 mg, IntraVENous, Q6H PRN, Gregory Díaz MD    melatonin tablet 3 mg, 3 mg, Oral, QHS PRN, Gregory Díaz MD, 3 mg at 05/23/21 3535    Review of Systems:   A comprehensive review of systems was negative except for that written in the HPI. Vital Signs:     Patient Vitals for the past 24 hrs:  Patient Vitals for the past 24 hrs:   BP Temp Pulse Resp SpO2   05/22/21 1536 106/63 98.8 °F (37.1 °C) (!) 112 13 92 %   05/22/21 1118 118/67 98.2 °F (36.8 °C) (!) 104 18 93 %   05/22/21 0745 102/60 97.8 °F (36.6 °C) 100 17 92 %         Intake/Output Summary (Last 24 hours) at 5/27/2021 1325  Last data filed at 5/27/2021 1040  Gross per 24 hour   Intake 1117.5 ml   Output 1785 ml   Net -667.5 ml        Physical Examination:     Estimated body mass index is 28.93 kg/m² as calculated from the following:    Height as of this encounter: 5' 6\" (1.676 m). Weight as of this encounter: 81.3 kg (179 lb 3.7 oz). Estimated body mass index is 28.93 kg/m² as calculated from the following:    Height as of this encounter: 5' 6\" (1.676 m). Weight as of this encounter: 81.3 kg (179 lb 3.7 oz). General: In no acute distress. Well developed, well nourished. Head: Normocephalic, atraumatic. ENT:   Oral mucosa moist.  Neck: Supple. Heart: Regular rhythm. Tachycardic. Chest: Symmetrical expansion decreased basal breath sounds  Abdomen: Soft, nontender. No abnormal distention. Extremities:  No edema, no cyanosis. Neurological:   Alert, oriented X3. Skin: No jaundice.            Data Review:       Labs:     Recent Labs     05/27/21  0541 05/26/21  0400   WBC 14.0* 14.8*   HGB 7.7* 6.7*   HCT 25.1* 22.3*   * 475*     Recent Labs     05/27/21  0541 05/26/21  0400 05/25/21  0326    139 141   K 3.5 3.5 3.5    106 108   CO2 24 29 27   BUN 11 9 10   CREA 0.92 1.08 1.01   * 167* 168*   CA 9.5 8.9 8.6   MG  --   --  2.3     No results for input(s): ALT, AP, TBIL, TBILI, TP, ALB, GLOB, GGT, AML, LPSE in the last 72 hours. No lab exists for component: SGOT, GPT, AMYP, HLPSE  CXR 5/25  IMPRESSION  Stable small right pleural effusion and right basilar opacity. No  pneumothorax following removal of one of the chest tubes.    ______________________________________________________________________  EXPECTED LENGTH OF STAY: 4d 9h  ACTUAL LENGTH OF STAY:          Amparo Berry MD

## 2021-05-27 NOTE — PROGRESS NOTES
Mr. Viktoria Gambino is POD#6 after a R VATS decortication for Strept Empyema. No acute events overnight. Afebrile  HD stable    CT <100ml, no air leak    On exam he is laying in bed  Alert and oriented  Slightly diminished breath sounds R side  Dressing c/d/i    CXR- improving appearance    Mr. Tsang is progressing well from a surgical standpoint. Will remove final chest tube today. He is stable for discharge from a Thoracic standpoint. We will arrange a post op appointment.

## 2021-05-27 NOTE — PROGRESS NOTES
Bedside and Verbal shift change report given to April Rn (oncoming nurse) by Janet Dominguez (offgoing nurse).  Report included the following information SBAR, Kardex, Procedure Summary, Intake/Output, MAR, Recent Results and Cardiac Rhythm ST.

## 2021-05-27 NOTE — PROGRESS NOTES
Problem: Pain  Goal: *Control of Pain  Outcome: Progressing Towards Goal     Problem: Risk for Spread of Infection  Goal: Prevent transmission of infectious organism to others  Description: Prevent the transmission of infectious organisms to other patients, staff members, and visitors. Outcome: Progressing Towards Goal     Problem: Falls - Risk of  Goal: *Absence of Falls  Description: Document Lida Charles Fall Risk and appropriate interventions in the flowsheet. Outcome: Progressing Towards Goal  Note: Fall Risk Interventions:  Mobility Interventions: Assess mobility with egress test         Medication Interventions: Evaluate medications/consider consulting pharmacy         History of Falls Interventions: Evaluate medications/consider consulting pharmacy         Problem: Patient Education: Go to Patient Education Activity  Goal: Patient/Family Education  Outcome: Progressing Towards Goal     Problem: Diabetes Self-Management  Goal: *Disease process and treatment process  Description: Define diabetes and identify own type of diabetes; list 3 options for treating diabetes. Outcome: Progressing Towards Goal  Goal: *Incorporating nutritional management into lifestyle  Description: Describe effect of type, amount and timing of food on blood glucose; list 3 methods for planning meals. Outcome: Progressing Towards Goal  Goal: *Incorporating physical activity into lifestyle  Description: State effect of exercise on blood glucose levels. Outcome: Progressing Towards Goal  Goal: *Developing strategies to promote health/change behavior  Description: Define the ABC's of diabetes; identify appropriate screenings, schedule and personal plan for screenings. Outcome: Progressing Towards Goal  Goal: *Using medications safely  Description: State effect of diabetes medications on diabetes; name diabetes medication taking, action and side effects.   Outcome: Progressing Towards Goal  Goal: *Monitoring blood glucose, interpreting and using results  Description: Identify recommended blood glucose targets  and personal targets. Outcome: Progressing Towards Goal  Goal: *Prevention, detection and treatment of chronic complications  Description: Define the natural course of diabetes and describe the relationship of blood glucose levels to long term complications of diabetes.   Outcome: Progressing Towards Goal     Problem: Breathing Pattern - Ineffective  Goal: *Absence of hypoxia  Outcome: Progressing Towards Goal  Goal: *Use of effective breathing techniques  Outcome: Progressing Towards Goal

## 2021-05-27 NOTE — PROGRESS NOTES
Patient A&Ox3 and consented to receive J&J vaccine prior to discharge. Attestation signed and EUA fact sheet reviewed and discussed with patient allowing time for questions and answers. henry Clifford served attending Md Cecelia Aj, who communicated that if patient is aware of S/E and has given consent , patient can receive vaccination of ( J&J). Patient anticipated discharge next 24-48hrs and will follow up to confirm if patient is still receptive to receiving J&J vaccine.    Wolf Brar RN

## 2021-05-27 NOTE — PROGRESS NOTES
Bedside shift change report given to [facility] [id number] (oncoming nurse) by Bry Lemus RN (offgoing nurse). Report included the following information SBAR, Intake/Output, MAR, Med Rec Status and Cardiac Rhythm sinus tach.

## 2021-05-28 LAB
ANION GAP SERPL CALC-SCNC: 7 MMOL/L (ref 5–15)
BUN SERPL-MCNC: 14 MG/DL (ref 6–20)
BUN/CREAT SERPL: 14 (ref 12–20)
CALCIUM SERPL-MCNC: 9.5 MG/DL (ref 8.5–10.1)
CHLORIDE SERPL-SCNC: 103 MMOL/L (ref 97–108)
CO2 SERPL-SCNC: 25 MMOL/L (ref 21–32)
CREAT SERPL-MCNC: 0.99 MG/DL (ref 0.7–1.3)
ECHO AO ROOT DIAM: 3.75 CM
ECHO AV AREA PEAK VELOCITY: 2.96 CM2
ECHO AV AREA/BSA PEAK VELOCITY: 1.5 CM2/M2
ECHO AV PEAK GRADIENT: 5.59 MMHG
ECHO AV PEAK VELOCITY: 118.24 CM/S
ECHO EST RA PRESSURE: 3 MMHG
ECHO LA AREA 4C: 20.57 CM2
ECHO LA MAJOR AXIS: 4.2 CM
ECHO LA MINOR AXIS: 2.2 CM
ECHO LA VOL 2C: 73.08 ML (ref 18–58)
ECHO LA VOL 4C: 58.6 ML (ref 18–58)
ECHO LA VOL BP: 72.15 ML (ref 18–58)
ECHO LA VOL/BSA BIPLANE: 37.77 ML/M2 (ref 16–28)
ECHO LA VOLUME INDEX A2C: 38.26 ML/M2 (ref 16–28)
ECHO LA VOLUME INDEX A4C: 30.68 ML/M2 (ref 16–28)
ECHO LV E' LATERAL VELOCITY: 24.89 CM/S
ECHO LV E' SEPTAL VELOCITY: 10.16 CM/S
ECHO LV EDV A2C: 104.65 ML
ECHO LV EDV A4C: 108.4 ML
ECHO LV EDV BP: 106.13 ML (ref 67–155)
ECHO LV EDV INDEX A4C: 56.8 ML/M2
ECHO LV EDV INDEX BP: 55.6 ML/M2
ECHO LV EDV NDEX A2C: 54.8 ML/M2
ECHO LV EJECTION FRACTION A2C: 71 PERCENT
ECHO LV EJECTION FRACTION A4C: 88 PERCENT
ECHO LV EJECTION FRACTION BIPLANE: 81.2 PERCENT (ref 55–100)
ECHO LV ESV A2C: 30.35 ML
ECHO LV ESV A4C: 13.13 ML
ECHO LV ESV BP: 19.92 ML (ref 22–58)
ECHO LV ESV INDEX A2C: 15.9 ML/M2
ECHO LV ESV INDEX A4C: 6.9 ML/M2
ECHO LV ESV INDEX BP: 10.4 ML/M2
ECHO LV INTERNAL DIMENSION DIASTOLIC: 5.41 CM (ref 4.2–5.9)
ECHO LV INTERNAL DIMENSION SYSTOLIC: 3.5 CM
ECHO LV IVSD: 0.8 CM (ref 0.6–1)
ECHO LV MASS 2D: 151.8 G (ref 88–224)
ECHO LV MASS INDEX 2D: 79.5 G/M2 (ref 49–115)
ECHO LV POSTERIOR WALL DIASTOLIC: 0.77 CM (ref 0.6–1)
ECHO LVOT DIAM: 2.2 CM
ECHO LVOT PEAK GRADIENT: 3.42 MMHG
ECHO LVOT PEAK VELOCITY: 92.45 CM/S
ECHO MV A VELOCITY: 65.76 CM/S
ECHO MV AREA PHT: 5.09 CM2
ECHO MV E DECELERATION TIME (DT): 149.08 MS
ECHO MV E VELOCITY: 80.33 CM/S
ECHO MV E/A RATIO: 1.22
ECHO MV E/E' LATERAL: 3.23
ECHO MV E/E' RATIO (AVERAGED): 5.57
ECHO MV E/E' SEPTAL: 7.91
ECHO MV PRESSURE HALF TIME (PHT): 43.23 MS
ECHO PV PEAK INSTANTANEOUS GRADIENT SYSTOLIC: 2.59 MMHG
ECHO PV REGURGITANT MAX VELOCITY: 80.49 CM/S
ECHO RV INTERNAL DIMENSION: 5.03 CM
ECHO RV TAPSE: 2.7 CM (ref 1.5–2)
ERYTHROCYTE [DISTWIDTH] IN BLOOD BY AUTOMATED COUNT: 15.1 % (ref 11.5–14.5)
GLUCOSE BLD STRIP.AUTO-MCNC: 201 MG/DL (ref 65–117)
GLUCOSE BLD STRIP.AUTO-MCNC: 203 MG/DL (ref 65–117)
GLUCOSE BLD STRIP.AUTO-MCNC: 261 MG/DL (ref 65–117)
GLUCOSE BLD STRIP.AUTO-MCNC: 262 MG/DL (ref 65–117)
GLUCOSE SERPL-MCNC: 191 MG/DL (ref 65–100)
HCT VFR BLD AUTO: 28 % (ref 36.6–50.3)
HGB BLD-MCNC: 8.6 G/DL (ref 12.1–17)
MCH RBC QN AUTO: 26.1 PG (ref 26–34)
MCHC RBC AUTO-ENTMCNC: 30.7 G/DL (ref 30–36.5)
MCV RBC AUTO: 85.1 FL (ref 80–99)
NRBC # BLD: 0 K/UL (ref 0–0.01)
NRBC BLD-RTO: 0 PER 100 WBC
PLATELET # BLD AUTO: 508 K/UL (ref 150–400)
PMV BLD AUTO: 9.2 FL (ref 8.9–12.9)
POTASSIUM SERPL-SCNC: 3.6 MMOL/L (ref 3.5–5.1)
RBC # BLD AUTO: 3.29 M/UL (ref 4.1–5.7)
SERVICE CMNT-IMP: ABNORMAL
SODIUM SERPL-SCNC: 135 MMOL/L (ref 136–145)
WBC # BLD AUTO: 13.3 K/UL (ref 4.1–11.1)

## 2021-05-28 PROCEDURE — 85027 COMPLETE CBC AUTOMATED: CPT

## 2021-05-28 PROCEDURE — 74011250637 HC RX REV CODE- 250/637: Performed by: THORACIC SURGERY (CARDIOTHORACIC VASCULAR SURGERY)

## 2021-05-28 PROCEDURE — 82962 GLUCOSE BLOOD TEST: CPT

## 2021-05-28 PROCEDURE — 74011000250 HC RX REV CODE- 250: Performed by: FAMILY MEDICINE

## 2021-05-28 PROCEDURE — P9047 ALBUMIN (HUMAN), 25%, 50ML: HCPCS | Performed by: INTERNAL MEDICINE

## 2021-05-28 PROCEDURE — 74011636637 HC RX REV CODE- 636/637: Performed by: NURSE PRACTITIONER

## 2021-05-28 PROCEDURE — 74011250636 HC RX REV CODE- 250/636: Performed by: THORACIC SURGERY (CARDIOTHORACIC VASCULAR SURGERY)

## 2021-05-28 PROCEDURE — 74011250636 HC RX REV CODE- 250/636: Performed by: FAMILY MEDICINE

## 2021-05-28 PROCEDURE — 74011636637 HC RX REV CODE- 636/637: Performed by: THORACIC SURGERY (CARDIOTHORACIC VASCULAR SURGERY)

## 2021-05-28 PROCEDURE — 77010033678 HC OXYGEN DAILY

## 2021-05-28 PROCEDURE — C9113 INJ PANTOPRAZOLE SODIUM, VIA: HCPCS | Performed by: FAMILY MEDICINE

## 2021-05-28 PROCEDURE — 74011250637 HC RX REV CODE- 250/637: Performed by: HOSPITALIST

## 2021-05-28 PROCEDURE — 65660000000 HC RM CCU STEPDOWN

## 2021-05-28 PROCEDURE — 74011250636 HC RX REV CODE- 250/636: Performed by: INTERNAL MEDICINE

## 2021-05-28 PROCEDURE — 74011000258 HC RX REV CODE- 258: Performed by: INTERNAL MEDICINE

## 2021-05-28 PROCEDURE — 97164 PT RE-EVAL EST PLAN CARE: CPT

## 2021-05-28 PROCEDURE — 36415 COLL VENOUS BLD VENIPUNCTURE: CPT

## 2021-05-28 PROCEDURE — 80048 BASIC METABOLIC PNL TOTAL CA: CPT

## 2021-05-28 RX ORDER — SODIUM CHLORIDE 9 MG/ML
75 INJECTION, SOLUTION INTRAVENOUS CONTINUOUS
Status: DISCONTINUED | OUTPATIENT
Start: 2021-05-28 | End: 2021-06-01

## 2021-05-28 RX ADMIN — ENOXAPARIN SODIUM 40 MG: 40 INJECTION SUBCUTANEOUS at 10:24

## 2021-05-28 RX ADMIN — ALBUMIN (HUMAN) 25 G: 0.25 INJECTION, SOLUTION INTRAVENOUS at 13:02

## 2021-05-28 RX ADMIN — ALBUMIN (HUMAN) 25 G: 0.25 INJECTION, SOLUTION INTRAVENOUS at 23:19

## 2021-05-28 RX ADMIN — HYDROMORPHONE HYDROCHLORIDE 1 MG: 1 INJECTION, SOLUTION INTRAMUSCULAR; INTRAVENOUS; SUBCUTANEOUS at 23:19

## 2021-05-28 RX ADMIN — HYDROMORPHONE HYDROCHLORIDE 1 MG: 1 INJECTION, SOLUTION INTRAMUSCULAR; INTRAVENOUS; SUBCUTANEOUS at 01:43

## 2021-05-28 RX ADMIN — INSULIN LISPRO 5 UNITS: 100 INJECTION, SOLUTION INTRAVENOUS; SUBCUTANEOUS at 13:02

## 2021-05-28 RX ADMIN — INSULIN LISPRO 5 UNITS: 100 INJECTION, SOLUTION INTRAVENOUS; SUBCUTANEOUS at 17:15

## 2021-05-28 RX ADMIN — INSULIN LISPRO 2 UNITS: 100 INJECTION, SOLUTION INTRAVENOUS; SUBCUTANEOUS at 22:04

## 2021-05-28 RX ADMIN — METOPROLOL TARTRATE 12.5 MG: 25 TABLET, FILM COATED ORAL at 08:33

## 2021-05-28 RX ADMIN — SODIUM CHLORIDE 40 MG: 9 INJECTION INTRAMUSCULAR; INTRAVENOUS; SUBCUTANEOUS at 08:34

## 2021-05-28 RX ADMIN — HYDROMORPHONE HYDROCHLORIDE 1 MG: 1 INJECTION, SOLUTION INTRAMUSCULAR; INTRAVENOUS; SUBCUTANEOUS at 19:45

## 2021-05-28 RX ADMIN — FERROUS SULFATE TAB 325 MG (65 MG ELEMENTAL FE) 325 MG: 325 (65 FE) TAB at 08:34

## 2021-05-28 RX ADMIN — HYDROMORPHONE HYDROCHLORIDE 1 MG: 1 INJECTION, SOLUTION INTRAMUSCULAR; INTRAVENOUS; SUBCUTANEOUS at 06:00

## 2021-05-28 RX ADMIN — SODIUM CHLORIDE 75 ML/HR: 9 INJECTION, SOLUTION INTRAVENOUS at 13:02

## 2021-05-28 RX ADMIN — INSULIN LISPRO 3 UNITS: 100 INJECTION, SOLUTION INTRAVENOUS; SUBCUTANEOUS at 07:14

## 2021-05-28 RX ADMIN — ALBUMIN (HUMAN) 25 G: 0.25 INJECTION, SOLUTION INTRAVENOUS at 06:00

## 2021-05-28 RX ADMIN — ALBUMIN (HUMAN) 25 G: 0.25 INJECTION, SOLUTION INTRAVENOUS at 17:15

## 2021-05-28 RX ADMIN — HYDROMORPHONE HYDROCHLORIDE 1 MG: 1 INJECTION, SOLUTION INTRAMUSCULAR; INTRAVENOUS; SUBCUTANEOUS at 15:11

## 2021-05-28 RX ADMIN — ASPIRIN 81 MG: 81 TABLET, CHEWABLE ORAL at 08:34

## 2021-05-28 RX ADMIN — Medication 10 ML: at 06:00

## 2021-05-28 RX ADMIN — CEFTRIAXONE SODIUM 2 G: 2 INJECTION, POWDER, FOR SOLUTION INTRAMUSCULAR; INTRAVENOUS at 17:15

## 2021-05-28 RX ADMIN — INSULIN GLARGINE 25 UNITS: 100 INJECTION, SOLUTION SUBCUTANEOUS at 22:03

## 2021-05-28 RX ADMIN — FERROUS SULFATE TAB 325 MG (65 MG ELEMENTAL FE) 325 MG: 325 (65 FE) TAB at 17:28

## 2021-05-28 RX ADMIN — ATORVASTATIN CALCIUM 80 MG: 40 TABLET, FILM COATED ORAL at 08:34

## 2021-05-28 RX ADMIN — METOPROLOL TARTRATE 12.5 MG: 25 TABLET, FILM COATED ORAL at 22:03

## 2021-05-28 RX ADMIN — Medication 10 ML: at 13:03

## 2021-05-28 NOTE — PROGRESS NOTES
PHYSICAL THERAPY REEVALUATION WITH DISCHARGE  Patient: Toya Akhtar (63 y.o. male)  Date: 5/28/2021  Primary Diagnosis: Lung abscess ([facility] [address] [J85.2]  Procedure(s) (LRB):  RIGHT VATS, DECORTICATION  (Right) 7 Days Post-Op   Precautions:          ASSESSMENT  Based on the objective data described below, the patient appears to be at baseline for functional mobility. PT re-consulted as pt not getting OOB. Pt does not require physical assistance for transfers and ambulation. Educated pt on ambulating 3x/day and OOB to chair 3x/day to prevent deconditioning, RN aware. Anticipate no needs for PT. Functional Outcome Measure: The patient scored 90/100 on the Barthel Index outcome measure which is indicative of patient is 10% impaired with self care and dependency on others. Other factors to consider for discharge: pt reports he does not need to navigate steps          PLAN :  Patient is discharged from skilled acute physical therapy at this time    Recommendation for discharge: (in order for the patient to meet his/her long term goals)  No skilled physical therapy/ follow up rehabilitation needs identified at this time. This discharge recommendation:  Has not yet been discussed the attending provider and/or case management    Equipment recommendations for successful discharge: none         SUBJECTIVE:   Patient stated I am fine.     OBJECTIVE DATA SUMMARY:   HISTORY:    History reviewed. No pertinent past medical history. History reviewed. No pertinent surgical history.   Hospital course since last seen and reason for reevaluation: s/p VATs    Personal factors and/or comorbidities impacting plan of care: PMH    Home Situation  Home Environment: Private residence  # Steps to Enter: 20  One/Two Story Residence: One story  Living Alone: No  Support Systems: Family member(s)  Patient Expects to be Discharged to[de-identified] Private residence  Current DME Used/Available at Home: None    EXAMINATION/PRESENTATION/DECISION MAKING:   Critical Behavior:  Neurologic State: Alert  Orientation Level: Oriented X4  Cognition: Appropriate decision making, Appropriate for age attention/concentration, Appropriate safety awareness, Follows commands     Hearing: Auditory  Auditory Impairment: None  Skin:  intact  Edema: none   Range Of Motion:  AROM: Within functional limits           PROM: Within functional limits           Strength:    Strength: Within functional limits                    Tone & Sensation:   Tone: Normal                              Coordination:  Coordination: Within functional limits  Vision:      Functional Mobility:  Bed Mobility:     Supine to Sit: Independent        Transfers:  Sit to Stand: Independent  Stand to Sit: Independent                       Balance:   Sitting: Intact  Standing: Intact; Without support  Ambulation/Gait Training:  Distance (ft): 150 Feet (ft)  Assistive Device: Gait belt  Ambulation - Level of Assistance: Modified independent                 Base of Support: Narrowed  Functional Measure:  Barthel Index:    Bathin  Bladder: 10  Bowels: 10  Groomin  Dressing: 10  Feeding: 10  Mobility: 10  Stairs: 5  Toilet Use: 10  Transfer (Bed to Chair and Back): 15  Total: 90/100       The Barthel ADL Index: Guidelines  1. The index should be used as a record of what a patient does, not as a record of what a patient could do. 2. The main aim is to establish degree of independence from any help, physical or verbal, however minor and for whatever reason. 3. The need for supervision renders the patient not independent. 4. A patient's performance should be established using the best available evidence. Asking the patient, friends/relatives and nurses are the usual sources, but direct observation and common sense are also important. However direct testing is not needed. 5. Usually the patient's performance over the preceding 24-48 hours is important, but occasionally longer periods will be relevant.   6. Middle categories imply that the patient supplies over 50 per cent of the effort. 7. Use of aids to be independent is allowed. Ramírez Quinn., Barthel, DAveryW. (5604). Functional evaluation: the Barthel Index. [address] [facility] (14)2. RORY Sanders, Geri Pires., Arlena Cushing., [place], [address] (1999). Measuring the change indisability after inpatient rehabilitation; comparison of the responsiveness of the Barthel Index and Functional [place] Measure. Journal of Neurology, Neurosurgery, and Psychiatry, 66(4), 319-226. Shane Goodwin, N.J.A, JOSÉ MIGUEL Gomes, & Oscar Nam M.A. (2004.) Assessment of post-stroke quality of life in cost-effectiveness studies: The usefulness of the Barthel Index and the EuroQoL-5D. Quality of Life Research, 13, 134-77           Activity Tolerance:   Good and SpO2 >94% on 2 L O2    After treatment patient left in no apparent distress:   Sitting in chair and Call bell within reach    COMMUNICATION/EDUCATION:   The patients plan of care was discussed with: Registered nurse. Fall prevention education was provided and the patient/caregiver indicated understanding. and Patient/family have participated as able in goal setting and plan of care.     Thank you for this referral.  Maria M Mckeon, PT, DPT   Time Calculation: 9 mins

## 2021-05-28 NOTE — PROGRESS NOTES
[address] [facility] Adult  Hospitalist Group                                                                                          Hospitalist Progress Note  Priyanka Caba MD  Answering service: [phone] from in house phone     2021 : Date of admission    Date of Service:  2021  NAME:  Navin Mullen  :  1975  MRN:  [id number]      Admission Summary:   Yovani Small a 39 y. o. male with past medical history of CAD, tobacco use disorder, non-insulin-dependent type 2 diabetes, dyslipidemia, hypertension who presented to [facility] with multiple complaints. He was admitted to [facility] on 21.  Patient endorsed a 1 month history of recurrent fevers, chills, night sweats, malaise.  He was a cigarette smoker, down from 2 packs prior to his MI last year. [facility]  had no recent travel or sick contacts. Julio Carias was not up-to-date on Covid vaccination. Patient states that he has a chronic cough which really did not change much prior to presentation. He states that what changed over the past month was that he was having right sided chest pain. This was mostly on the right lateral thorax. Patient was found to have right complex loculated pleural effusion, possible lung abscess. Thoracic surgery was consulted and  took  patient for decortication of the right lung via VATS by thoracic surgery on May 21, 2021.    21: Patient has been taken off airborne respiratory isolation by Infectious Disease today as TB has been ruled out with 2 negative acid fast bacilli smears. Interval history / Subjective:     Patient resting in bed, reports that he feels much better, second chest tube removed today       Assessment & Plan:     1. Right empyema. antibiotics with Rocephin, appreciate ID input  CT chest 2021  1. Loculated right-sided hydropneumothorax with right chest wall emphysema.   Differentiating postprocedural gas from chest tube removal into the chest wall  versus extension of pleural infection into the chest is difficult  2. Diffuse groundglass and airspace opacities most confluent at the right base  compatible with pneumonia. While portions of the consolidation in the right  lower lobe are minimally improved overall appearance of the lungs has progressed  3. Right hilar and mediastinal adenopathy  CT findings discussed with thoracic surgery, no acute intervention  S/p Decortication of the right lung via VATS by thoracic surgery on May 21, 2021. One CT removed 5/25/21, final chest tube removed today  Will need Rocephin for 4 weeks per ID  AFB negative x3    2. CAD. with stents x 2 to RCA in April, 2020. noncompliance to dual antiplatelet therapy, he developed instent stenosis, that was treated with PCI and furthermore,  had stent placed to the LAD on September 29, 2020. Cont home  meds    3. Hypoalbuminemia. contributing to tachycardia with reduced intravascular volume in the setting of decreased oncotic pressures. S/p IV albumin    4. Persistent tachycardia. Improving with volume. Cont BB, will obtain CT of the chest and echocardiogram    5. DM ty 2   lantus + ISS monitor BG    6. Hypokalemia- repleted K    7. Chr anemia-   Low Iron /B12/folate ok, replace oral and IV Iron, f/u  stool occult blood.  Hgb 6.8 transfuse one unit PRBC today    Code status: full      Care Plan discussed with: Nurse  Anticipated Disposition: Home w/Family  Anticipated Discharge: Greater than 48 hours     Hospital Problems  Never Reviewed        Codes Class Noted POA    Lung abscess ([facility] [address] ICD-10-CM: J85.2  ICD-9-CM: 513.0  5/18/2021 Unknown              Current Facility-Administered Medications:     pantoprazole (PROTONIX) 40 mg in 0.9% sodium chloride 10 mL injection, 40 mg, IntraVENous, DAILY, Nathan Crow MD, 40 mg at 05/28/21 0834    insulin lispro (HUMALOG) injection, , SubCUTAneous, AC&HS, Rosa Rodriguez NP, 3 Units at 05/28/21 0714    0.9% sodium chloride infusion 250 mL, 250 mL, IntraVENous, PRN, Lalo Hope MD    COVID-19 vac,Ad26-PF (CRYSTAL) injection 1 Dose, 1 Dose, IntraMUSCular, PRIOR TO DISCHARGE, Lalo Hope MD    cefTRIAXone (ROCEPHIN) 2 g in 0.9% sodium chloride (MBP/ADV) 50 mL MBP, 2 g, IntraVENous, Q24H, Amy Rich MD, Last Rate: 100 mL/hr at 05/27/21 1724, 2 g at 05/27/21 1724    ferrous sulfate tablet 325 mg, 1 Tablet, Oral, BID WITH MEALS, Lalo Hope MD, 325 mg at 05/28/21 0834    albumin human 25% (BUMINATE) solution 25 g, 25 g, IntraVENous, Q6H, Lm Centeno, DO, 25 g at 05/28/21 0600    aspirin chewable tablet 81 mg, 81 mg, Oral, DAILY, Lalo Hope MD, 81 mg at 05/28/21 0834    metoprolol tartrate (LOPRESSOR) tablet 12.5 mg, 12.5 mg, Oral, BID, Lalo Hope MD, 12.5 mg at 05/28/21 6519    diphenhydrAMINE (BENADRYL) capsule 25 mg, 25 mg, Oral, Q6H PRN, Elizabeth Short MD, 25 mg at 05/21/21 0120    insulin glargine (LANTUS) injection 25 Units, 25 Units, SubCUTAneous, QHS, Elizabeth Short MD, 25 Units at 05/27/21 2210    sodium chloride (NS) flush 5-40 mL, 5-40 mL, IntraVENous, Q8H, Elizabeth Short MD, 10 mL at 05/28/21 0600    sodium chloride (NS) flush 5-40 mL, 5-40 mL, IntraVENous, PRN, Elizabeth Short MD    oxyCODONE-acetaminophen (PERCOCET) 5-325 mg per tablet 2 Tablet, 2 Tablet, Oral, Q4H PRN, Elizabeth Short MD, 2 Tablet at 05/27/21 1030    HYDROmorphone (PF) (DILAUDID) injection 1 mg, 1 mg, IntraVENous, Q3H PRN, Elizabeth Short MD, 1 mg at 05/28/21 0600    naloxone (NARCAN) injection 0.4 mg, 0.4 mg, IntraVENous, PRN, Elizabeth Short MD    enoxaparin (LOVENOX) injection 40 mg, 40 mg, SubCUTAneous, Q24H, Elizabeth Short MD, 40 mg at 05/28/21 1024    glucose chewable tablet 16 g, 4 Tablet, Oral, PRN, Elizabeth Short MD    dextrose (D50W) injection syrg 12.5-25 g, 12.5-25 g, IntraVENous, PRN, Elizabeth Short MD    glucagon (GLUCAGEN) injection 1 mg, 1 mg, IntraMUSCular, PRN, Kayla Coy MD    atorvastatin (LIPITOR) tablet 80 mg, 80 mg, Oral, DAILY, Kayla Coy MD, 80 mg at 05/28/21 0834    nicotine (NICODERM CQ) 21 mg/24 hr patch 1 Patch, 1 Patch, TransDERmal, DAILY, Kayla Cyo MD    polyethylene glycol (MIRALAX) packet 17 g, 17 g, Oral, DAILY PRN, Kayla Coy MD    promethazine (PHENERGAN) tablet 12.5 mg, 12.5 mg, Oral, Q6H PRN **OR** ondansetron (ZOFRAN) injection 4 mg, 4 mg, IntraVENous, Q6H PRN, Kayla Coy MD    melatonin tablet 3 mg, 3 mg, Oral, QHS PRN, Kayla Coy MD, 3 mg at 05/23/21 9545    Review of Systems:   A comprehensive review of systems was negative except for that written in the HPI. Vital Signs:     Patient Vitals for the past 24 hrs:  Patient Vitals for the past 24 hrs:   BP Temp Pulse Resp SpO2   05/22/21 1536 106/63 98.8 °F (37.1 °C) (!) 112 13 92 %   05/22/21 1118 118/67 98.2 °F (36.8 °C) (!) 104 18 93 %   05/22/21 0745 102/60 97.8 °F (36.6 °C) 100 17 92 %         Intake/Output Summary (Last 24 hours) at 5/28/2021 1053  Last data filed at 5/28/2021 1027  Gross per 24 hour   Intake 450 ml   Output 1250 ml   Net -800 ml        Physical Examination:     Estimated body mass index is 28.76 kg/m² as calculated from the following:    Height as of this encounter: 5' 6\" (1.676 m). Weight as of this encounter: 80.8 kg (178 lb 3.2 oz). Estimated body mass index is 28.76 kg/m² as calculated from the following:    Height as of this encounter: 5' 6\" (1.676 m). Weight as of this encounter: 80.8 kg (178 lb 3.2 oz). General: In no acute distress. Well developed, well nourished. Head: Normocephalic, atraumatic. ENT:   Oral mucosa moist.  Neck: Supple. Heart: Regular rhythm. Tachycardic. Chest: Symmetrical expansion decreased basal breath sounds  Abdomen: Soft, nontender. No abnormal distention. Extremities:  No edema, no cyanosis. Neurological:   Alert, oriented X3.   Skin: No jaundice. Data Review:       Labs:     Recent Labs     05/28/21  0510 05/27/21  0541   WBC 13.3* 14.0*   HGB 8.6* 7.7*   HCT 28.0* 25.1*   * 511*     Recent Labs     05/28/21  0510 05/27/21  0541 05/26/21  0400   * 137 139   K 3.6 3.5 3.5    104 106   CO2 25 24 29   BUN 14 11 9   CREA 0.99 0.92 1.08   * 151* 167*   CA 9.5 9.5 8.9     No results for input(s): ALT, AP, TBIL, TBILI, TP, ALB, GLOB, GGT, AML, LPSE in the last 72 hours. No lab exists for component: SGOT, GPT, AMYP, HLPSE  CXR 5/25  IMPRESSION  Stable small right pleural effusion and right basilar opacity. No  pneumothorax following removal of one of the chest tubes.    ______________________________________________________________________  EXPECTED LENGTH OF STAY: 4d 9h  ACTUAL LENGTH OF STAY:          Jane Art MD

## 2021-05-28 NOTE — PROGRESS NOTES
Problem: Pain  Goal: *Control of Pain  Outcome: Progressing Towards Goal     Problem: Falls - Risk of  Goal: *Absence of Falls  Description: Document Mesha Fall Risk and appropriate interventions in the flowsheet.   Outcome: Progressing Towards Goal  Note: Fall Risk Interventions:  Mobility Interventions: Assess mobility with egress test, Communicate number of staff needed for ambulation/transfer         Medication Interventions: Evaluate medications/consider consulting pharmacy, Patient to call before getting OOB         History of Falls Interventions: Door open when patient unattended, Evaluate medications/consider consulting pharmacy, Investigate reason for fall

## 2021-05-28 NOTE — PROGRESS NOTES
Transitions of Care: 10% risk of re-admission      -TBD, CM asked MD to re-consult PT/OT, patient has not been getting OOB    -Will need 4 weeks of IV antibiotics      The CM requested PT consultation for this patient, despite encouragement, RN endorses patient has not been OOB. The patient remains on Oxygen, will need to be weaned prior to discharge. The patient will need 4 weeks of IV antibiotics- CM will need IV antibiotic orders. The CM met with the patient at bedside, discussed options for IV therapy- patient is interested in going to OP Infusion center- patient lives in [place], [place] and is wondering if he can go to [facility] in [place] for IV infusion. The CM will need to see if [facility] has an infusion center, if not, CM discussed home IV antibiotics- patient would be open to this options as well pending coverage. The patient was made aware that IF [facility], he would have to go to the hospital daily for 4 weeks for his antibiotic- patient verbalized he could probably find transport. The patient is open to home IV antibiotics- will send referral to [facility] and attempt to find home health, will also call [facility] and see if that is an option. CM [place] MD [place] patient is also not getting OOB- CM requested additional PT consultation- they have not seen since 5/20. CM sent referral to [facility], as well as multiple home health agencies if patient decides to d/c home with IV antibiotics:    [address] [facility]  [facility] cannot accept    [facility] also called [facility]- called main number- contact would be Gamaliel Washington ([phone])- CM called and left voicemail requesting a call back.  CM also called [facility] ([phone])- CM also called and left voicemail for Mike Rodriguez requesting a call back. CM exploring both options- OP Infusion at [facility] vs. Home with IV antibiotics. Patient needs to ambulate further- has not been getting OOB. 16:24 p.m.- CM received call from Mike Rodriguez with [address] cannot set-up the infusion without orders- Mike Rodriguez endorses that the infusion center is closed over the weekend and Monday-     Scheduling line ([phone], f: [phone]). Will need to call and fax orders if patient continues to want to do the OP infusion center- also exploring home IV infusion.     Cristal Alfredo, LCSW

## 2021-05-28 NOTE — PROGRESS NOTES
Bedside and Verbal shift change report given to April, ESTHER (oncoming nurse) by Nita Morin RN (offgoing nurse).  Report included the following information SBAR, Kardex, ED Summary, Procedure Summary, Intake/Output, MAR, Recent Results and Cardiac Rhythm NR-ST.

## 2021-05-28 NOTE — PROGRESS NOTES
Hospitalist Night Cover     Name: Nyoka Schwab  YOB: 1975      Overnight update:        Received call with abnormal CTA chest findings: \"1. Loculated right-sided hydropneumothorax with right chest wall emphysema.  Differentiating postprocedural gas from chest tube removal into the chest wall versus extension of pleural infection into the chest is difficult\"  - Reviewed CTA results with Dr Lizeth Bradley (CT surgery on call), no interventions needed at this time, post operative changes     David Hashimoto FNP-C, PA-C  [phone] or Carmen

## 2021-05-29 ENCOUNTER — APPOINTMENT (OUTPATIENT)
Dept: GENERAL RADIOLOGY | Age: 46
DRG: 121 | End: 2021-05-29
Attending: FAMILY MEDICINE
Payer: COMMERCIAL

## 2021-05-29 LAB
ANION GAP SERPL CALC-SCNC: 7 MMOL/L (ref 5–15)
BASOPHILS # BLD: 0.1 K/UL (ref 0–0.1)
BASOPHILS NFR BLD: 1 % (ref 0–1)
BUN SERPL-MCNC: 15 MG/DL (ref 6–20)
BUN/CREAT SERPL: 15 (ref 12–20)
CALCIUM SERPL-MCNC: 9.3 MG/DL (ref 8.5–10.1)
CHLORIDE SERPL-SCNC: 105 MMOL/L (ref 97–108)
CO2 SERPL-SCNC: 25 MMOL/L (ref 21–32)
CREAT SERPL-MCNC: 0.99 MG/DL (ref 0.7–1.3)
DIFFERENTIAL METHOD BLD: ABNORMAL
EOSINOPHIL # BLD: 0.2 K/UL (ref 0–0.4)
EOSINOPHIL NFR BLD: 2 % (ref 0–7)
ERYTHROCYTE [DISTWIDTH] IN BLOOD BY AUTOMATED COUNT: 15.6 % (ref 11.5–14.5)
GLUCOSE BLD STRIP.AUTO-MCNC: 185 MG/DL (ref 65–117)
GLUCOSE BLD STRIP.AUTO-MCNC: 219 MG/DL (ref 65–117)
GLUCOSE BLD STRIP.AUTO-MCNC: 247 MG/DL (ref 65–117)
GLUCOSE BLD STRIP.AUTO-MCNC: 270 MG/DL (ref 65–117)
GLUCOSE SERPL-MCNC: 166 MG/DL (ref 65–100)
HCT VFR BLD AUTO: 26.7 % (ref 36.6–50.3)
HGB BLD-MCNC: 8.1 G/DL (ref 12.1–17)
IMM GRANULOCYTES # BLD AUTO: 0.1 K/UL (ref 0–0.04)
IMM GRANULOCYTES NFR BLD AUTO: 1 % (ref 0–0.5)
LYMPHOCYTES # BLD: 1.8 K/UL (ref 0.8–3.5)
LYMPHOCYTES NFR BLD: 13 % (ref 12–49)
MCH RBC QN AUTO: 25.9 PG (ref 26–34)
MCHC RBC AUTO-ENTMCNC: 30.3 G/DL (ref 30–36.5)
MCV RBC AUTO: 85.3 FL (ref 80–99)
MONOCYTES # BLD: 0.9 K/UL (ref 0–1)
MONOCYTES NFR BLD: 7 % (ref 5–13)
NEUTS SEG # BLD: 10.3 K/UL (ref 1.8–8)
NEUTS SEG NFR BLD: 76 % (ref 32–75)
NRBC # BLD: 0 K/UL (ref 0–0.01)
NRBC BLD-RTO: 0 PER 100 WBC
PLATELET # BLD AUTO: 498 K/UL (ref 150–400)
PMV BLD AUTO: 9.5 FL (ref 8.9–12.9)
POTASSIUM SERPL-SCNC: 3.5 MMOL/L (ref 3.5–5.1)
RBC # BLD AUTO: 3.13 M/UL (ref 4.1–5.7)
SERVICE CMNT-IMP: ABNORMAL
SODIUM SERPL-SCNC: 137 MMOL/L (ref 136–145)
WBC # BLD AUTO: 13.5 K/UL (ref 4.1–11.1)

## 2021-05-29 PROCEDURE — 82962 GLUCOSE BLOOD TEST: CPT

## 2021-05-29 PROCEDURE — 94760 N-INVAS EAR/PLS OXIMETRY 1: CPT

## 2021-05-29 PROCEDURE — 74011636637 HC RX REV CODE- 636/637: Performed by: THORACIC SURGERY (CARDIOTHORACIC VASCULAR SURGERY)

## 2021-05-29 PROCEDURE — 85025 COMPLETE CBC W/AUTO DIFF WBC: CPT

## 2021-05-29 PROCEDURE — 36415 COLL VENOUS BLD VENIPUNCTURE: CPT

## 2021-05-29 PROCEDURE — 74011000258 HC RX REV CODE- 258: Performed by: INTERNAL MEDICINE

## 2021-05-29 PROCEDURE — 74011250636 HC RX REV CODE- 250/636: Performed by: FAMILY MEDICINE

## 2021-05-29 PROCEDURE — 74011250637 HC RX REV CODE- 250/637: Performed by: HOSPITALIST

## 2021-05-29 PROCEDURE — C9113 INJ PANTOPRAZOLE SODIUM, VIA: HCPCS | Performed by: FAMILY MEDICINE

## 2021-05-29 PROCEDURE — 74011250636 HC RX REV CODE- 250/636: Performed by: INTERNAL MEDICINE

## 2021-05-29 PROCEDURE — 74011250637 HC RX REV CODE- 250/637: Performed by: THORACIC SURGERY (CARDIOTHORACIC VASCULAR SURGERY)

## 2021-05-29 PROCEDURE — 80048 BASIC METABOLIC PNL TOTAL CA: CPT

## 2021-05-29 PROCEDURE — 74011636637 HC RX REV CODE- 636/637: Performed by: NURSE PRACTITIONER

## 2021-05-29 PROCEDURE — P9047 ALBUMIN (HUMAN), 25%, 50ML: HCPCS | Performed by: INTERNAL MEDICINE

## 2021-05-29 PROCEDURE — 71045 X-RAY EXAM CHEST 1 VIEW: CPT

## 2021-05-29 PROCEDURE — 74011250636 HC RX REV CODE- 250/636: Performed by: THORACIC SURGERY (CARDIOTHORACIC VASCULAR SURGERY)

## 2021-05-29 PROCEDURE — 65660000000 HC RM CCU STEPDOWN

## 2021-05-29 PROCEDURE — 74011000250 HC RX REV CODE- 250: Performed by: FAMILY MEDICINE

## 2021-05-29 PROCEDURE — 77010033678 HC OXYGEN DAILY

## 2021-05-29 RX ADMIN — ALBUMIN (HUMAN) 25 G: 0.25 INJECTION, SOLUTION INTRAVENOUS at 06:32

## 2021-05-29 RX ADMIN — FERROUS SULFATE TAB 325 MG (65 MG ELEMENTAL FE) 325 MG: 325 (65 FE) TAB at 08:33

## 2021-05-29 RX ADMIN — SODIUM CHLORIDE 40 MG: 9 INJECTION INTRAMUSCULAR; INTRAVENOUS; SUBCUTANEOUS at 08:34

## 2021-05-29 RX ADMIN — INSULIN GLARGINE 25 UNITS: 100 INJECTION, SOLUTION SUBCUTANEOUS at 21:38

## 2021-05-29 RX ADMIN — HYDROMORPHONE HYDROCHLORIDE 1 MG: 1 INJECTION, SOLUTION INTRAMUSCULAR; INTRAVENOUS; SUBCUTANEOUS at 03:45

## 2021-05-29 RX ADMIN — INSULIN LISPRO 3 UNITS: 100 INJECTION, SOLUTION INTRAVENOUS; SUBCUTANEOUS at 12:41

## 2021-05-29 RX ADMIN — HYDROMORPHONE HYDROCHLORIDE 1 MG: 1 INJECTION, SOLUTION INTRAMUSCULAR; INTRAVENOUS; SUBCUTANEOUS at 16:45

## 2021-05-29 RX ADMIN — SODIUM CHLORIDE 75 ML/HR: 9 INJECTION, SOLUTION INTRAVENOUS at 16:46

## 2021-05-29 RX ADMIN — INSULIN LISPRO 5 UNITS: 100 INJECTION, SOLUTION INTRAVENOUS; SUBCUTANEOUS at 16:46

## 2021-05-29 RX ADMIN — ASPIRIN 81 MG: 81 TABLET, CHEWABLE ORAL at 08:34

## 2021-05-29 RX ADMIN — ENOXAPARIN SODIUM 40 MG: 40 INJECTION SUBCUTANEOUS at 08:34

## 2021-05-29 RX ADMIN — FERROUS SULFATE TAB 325 MG (65 MG ELEMENTAL FE) 325 MG: 325 (65 FE) TAB at 16:45

## 2021-05-29 RX ADMIN — METOPROLOL TARTRATE 12.5 MG: 25 TABLET, FILM COATED ORAL at 21:38

## 2021-05-29 RX ADMIN — METOPROLOL TARTRATE 12.5 MG: 25 TABLET, FILM COATED ORAL at 08:33

## 2021-05-29 RX ADMIN — ALBUMIN (HUMAN) 25 G: 0.25 INJECTION, SOLUTION INTRAVENOUS at 12:41

## 2021-05-29 RX ADMIN — CEFTRIAXONE SODIUM 2 G: 2 INJECTION, POWDER, FOR SOLUTION INTRAMUSCULAR; INTRAVENOUS at 18:28

## 2021-05-29 RX ADMIN — HYDROMORPHONE HYDROCHLORIDE 1 MG: 1 INJECTION, SOLUTION INTRAMUSCULAR; INTRAVENOUS; SUBCUTANEOUS at 20:16

## 2021-05-29 RX ADMIN — ATORVASTATIN CALCIUM 80 MG: 40 TABLET, FILM COATED ORAL at 08:34

## 2021-05-29 RX ADMIN — INSULIN LISPRO 2 UNITS: 100 INJECTION, SOLUTION INTRAVENOUS; SUBCUTANEOUS at 21:38

## 2021-05-29 RX ADMIN — HYDROMORPHONE HYDROCHLORIDE 1 MG: 1 INJECTION, SOLUTION INTRAMUSCULAR; INTRAVENOUS; SUBCUTANEOUS at 22:58

## 2021-05-29 RX ADMIN — ALBUMIN (HUMAN) 25 G: 0.25 INJECTION, SOLUTION INTRAVENOUS at 18:29

## 2021-05-29 RX ADMIN — HYDROMORPHONE HYDROCHLORIDE 1 MG: 1 INJECTION, SOLUTION INTRAMUSCULAR; INTRAVENOUS; SUBCUTANEOUS at 08:36

## 2021-05-29 RX ADMIN — INSULIN LISPRO 2 UNITS: 100 INJECTION, SOLUTION INTRAVENOUS; SUBCUTANEOUS at 06:32

## 2021-05-29 RX ADMIN — ALBUMIN (HUMAN) 25 G: 0.25 INJECTION, SOLUTION INTRAVENOUS at 23:05

## 2021-05-29 NOTE — PROGRESS NOTES
Bedside shift change report given to Davonte Castellon RN (oncoming nurse) by ESTHER Anne (offgoing nurse).  Report included the following information SBAR, Kardex, Intake/Output, MAR, Accordion and Cardiac Rhythm NSR-ST.

## 2021-05-29 NOTE — PROGRESS NOTES
Problem: Pain  Goal: *Control of Pain  Outcome: Progressing Towards Goal     Problem: Patient Education: Go to Patient Education Activity  Goal: Patient/Family Education  Outcome: Progressing Towards Goal     Problem: Risk for Spread of Infection  Goal: Prevent transmission of infectious organism to others  Description: Prevent the transmission of infectious organisms to other patients, staff members, and visitors. Outcome: Progressing Towards Goal     Problem: Patient Education:  Go to Education Activity  Goal: Patient/Family Education  Outcome: Progressing Towards Goal     Problem: Falls - Risk of  Goal: *Absence of Falls  Description: Document Gail Portillo Fall Risk and appropriate interventions in the flowsheet. Outcome: Progressing Towards Goal  Note: Fall Risk Interventions:  Mobility Interventions: Assess mobility with egress test         Medication Interventions: Patient to call before getting OOB, Teach patient to arise slowly         History of Falls Interventions: Door open when patient unattended         Problem: Patient Education: Go to Patient Education Activity  Goal: Patient/Family Education  Outcome: Progressing Towards Goal     Problem: Diabetes Self-Management  Goal: *Disease process and treatment process  Description: Define diabetes and identify own type of diabetes; list 3 options for treating diabetes. Outcome: Progressing Towards Goal  Goal: *Incorporating nutritional management into lifestyle  Description: Describe effect of type, amount and timing of food on blood glucose; list 3 methods for planning meals. Outcome: Progressing Towards Goal  Goal: *Incorporating physical activity into lifestyle  Description: State effect of exercise on blood glucose levels. Outcome: Progressing Towards Goal  Goal: *Developing strategies to promote health/change behavior  Description: Define the ABC's of diabetes; identify appropriate screenings, schedule and personal plan for screenings.   Outcome: Progressing Towards Goal  Goal: *Using medications safely  Description: State effect of diabetes medications on diabetes; name diabetes medication taking, action and side effects. Outcome: Progressing Towards Goal  Goal: *Monitoring blood glucose, interpreting and using results  Description: Identify recommended blood glucose targets  and personal targets. Outcome: Progressing Towards Goal  Goal: *Prevention, detection, treatment of acute complications  Description: List symptoms of hyper- and hypoglycemia; describe how to treat low blood sugar and actions for lowering  high blood glucose level. Outcome: Progressing Towards Goal  Goal: *Prevention, detection and treatment of chronic complications  Description: Define the natural course of diabetes and describe the relationship of blood glucose levels to long term complications of diabetes.   Outcome: Progressing Towards Goal  Goal: *Developing strategies to address psychosocial issues  Description: Describe feelings about living with diabetes; identify support needed and support network  Outcome: Progressing Towards Goal  Goal: *Insulin pump training  Outcome: Progressing Towards Goal  Goal: *Sick day guidelines  Outcome: Progressing Towards Goal  Goal: *Patient Specific Goal (EDIT GOAL, INSERT TEXT)  Outcome: Progressing Towards Goal     Problem: Patient Education: Go to Patient Education Activity  Goal: Patient/Family Education  Outcome: Progressing Towards Goal     Problem: Breathing Pattern - Ineffective  Goal: *Absence of hypoxia  Outcome: Progressing Towards Goal  Goal: *Use of effective breathing techniques  Outcome: Progressing Towards Goal  Goal: *PALLIATIVE CARE:  Alleviation of Dyspnea  Outcome: Progressing Towards Goal     Problem: Patient Education: Go to Patient Education Activity  Goal: Patient/Family Education  Outcome: Progressing Towards Goal

## 2021-05-29 NOTE — PROGRESS NOTES
Problem: Pain  Goal: *Control of Pain  Outcome: Progressing Towards Goal     Problem: Patient Education: Go to Patient Education Activity  Goal: Patient/Family Education  Outcome: Progressing Towards Goal     Problem: Risk for Spread of Infection  Goal: Prevent transmission of infectious organism to others  Description: Prevent the transmission of infectious organisms to other patients, staff members, and visitors. Outcome: Progressing Towards Goal     Problem: Patient Education:  Go to Education Activity  Goal: Patient/Family Education  Outcome: Progressing Towards Goal     Problem: Falls - Risk of  Goal: *Absence of Falls  Description: Document Yulisa Sheldon Fall Risk and appropriate interventions in the flowsheet. Outcome: Progressing Towards Goal  Note: Fall Risk Interventions:  Mobility Interventions: Assess mobility with egress test         Medication Interventions: Patient to call before getting OOB, Teach patient to arise slowly         History of Falls Interventions: Door open when patient unattended         Problem: Patient Education: Go to Patient Education Activity  Goal: Patient/Family Education  Outcome: Progressing Towards Goal     Problem: Diabetes Self-Management  Goal: *Disease process and treatment process  Description: Define diabetes and identify own type of diabetes; list 3 options for treating diabetes. Outcome: Progressing Towards Goal  Goal: *Incorporating nutritional management into lifestyle  Description: Describe effect of type, amount and timing of food on blood glucose; list 3 methods for planning meals. Outcome: Progressing Towards Goal  Goal: *Incorporating physical activity into lifestyle  Description: State effect of exercise on blood glucose levels. Outcome: Progressing Towards Goal  Goal: *Developing strategies to promote health/change behavior  Description: Define the ABC's of diabetes; identify appropriate screenings, schedule and personal plan for screenings.   Outcome: Progressing Towards Goal  Goal: *Using medications safely  Description: State effect of diabetes medications on diabetes; name diabetes medication taking, action and side effects. Outcome: Progressing Towards Goal  Goal: *Monitoring blood glucose, interpreting and using results  Description: Identify recommended blood glucose targets  and personal targets. Outcome: Progressing Towards Goal  Goal: *Prevention, detection, treatment of acute complications  Description: List symptoms of hyper- and hypoglycemia; describe how to treat low blood sugar and actions for lowering  high blood glucose level. Outcome: Progressing Towards Goal  Goal: *Prevention, detection and treatment of chronic complications  Description: Define the natural course of diabetes and describe the relationship of blood glucose levels to long term complications of diabetes.   Outcome: Progressing Towards Goal  Goal: *Developing strategies to address psychosocial issues  Description: Describe feelings about living with diabetes; identify support needed and support network  Outcome: Progressing Towards Goal  Goal: *Insulin pump training  Outcome: Progressing Towards Goal  Goal: *Sick day guidelines  Outcome: Progressing Towards Goal  Goal: *Patient Specific Goal (EDIT GOAL, INSERT TEXT)  Outcome: Progressing Towards Goal     Problem: Patient Education: Go to Patient Education Activity  Goal: Patient/Family Education  Outcome: Progressing Towards Goal     Problem: Breathing Pattern - Ineffective  Goal: *Absence of hypoxia  Outcome: Progressing Towards Goal  Goal: *Use of effective breathing techniques  Outcome: Progressing Towards Goal  Goal: *PALLIATIVE CARE:  Alleviation of Dyspnea  Outcome: Progressing Towards Goal     Problem: Patient Education: Go to Patient Education Activity  Goal: Patient/Family Education  Outcome: Progressing Towards Goal

## 2021-05-29 NOTE — PROGRESS NOTES
ID Progress Note  2021      Minimally elevated temp yesterday. No dyspnea,     Objective:     Vitals:   Visit Vitals  /77 (BP 1 Location: Right arm, BP Patient Position: At rest)   Pulse (!) 106   Temp 98.9 °F (37.2 °C)   Resp 18   Ht 5' 6\" (1.676 m)   Wt 80.7 kg (178 lb)   SpO2 95%   BMI 28.73 kg/m²        Tmax:  Temp (24hrs), Av.3 °F (37.4 °C), Min:98.9 °F (37.2 °C), Max:99.7 °F (37.6 °C)      PHYSICAL EXAM:  Not in distress   Lungs: decreased breath sounds   Heart: s1, s2, RRR, no murmurs rubs or clicks  Abdomen: soft nontender, no guarding or rebound  Speech fluent     Labs:   Lab Results   Component Value Date/Time    WBC 13.5 (H) 2021 03:59 AM    HGB 8.1 (L) 2021 03:59 AM    HCT 26.7 (L) 2021 03:59 AM    PLATELET 630 (H)  03:59 AM    MCV 85.3 2021 03:59 AM     Lab Results   Component Value Date/Time    Sodium 137 2021 03:59 AM    Potassium 3.5 2021 03:59 AM    Chloride 105 2021 03:59 AM    CO2 25 2021 03:59 AM    Anion gap 7 2021 03:59 AM    Glucose 166 (H) 2021 03:59 AM    BUN 15 2021 03:59 AM    Creatinine 0.99 2021 03:59 AM    BUN/Creatinine ratio 15 2021 03:59 AM    GFR est AA >60 2021 03:59 AM    GFR est non-AA >60 2021 03:59 AM    Calcium 9.3 2021 03:59 AM    Bilirubin, total 0.4 2021 03:42 AM    Alk. phosphatase 109 2021 03:42 AM    Protein, total 6.5 2021 03:42 AM    Albumin 1.9 (L) 2021 03:42 AM    Globulin 4.6 (H) 2021 03:42 AM    A-G Ratio 0.4 (L) 2021 03:42 AM    ALT (SGPT) 39 2021 03:42 AM     CT of chest ():  1. Multiloculated right empyema. 2. Right lower lobe pneumonia. 3. Right upper lobe pneumonia with possible small lung abscess. 4. Emphysema. 5. Coronary artery disease.       Urin x () no growth    Assessment and Plan   CAP   right lung abscess/right empyema  S/p right video assisted thoracoscopy, full pulmonary decortication (5/21) by Dr. Rich Cash  leukocytosis  - COVID 19; rapid & PCR negative    Blood cx (5/19) no growth    Resp cx (5/18) Beta hemolytic group A strep    Fungal blood cx (5/19) no growth so far    Fluid cx (5/21) STREPTOCOCCUS CONSTELLATUS     procal 1.93 (5/18)    O2 @ 2-3L via n/c    HIV (-)  ; AFB (-) x 3   quantiferon (5/18) indeterminate, aspergillus galact Ag (+)    respiratory panel (-)   mycoplasma IgG (+) with (-) IgM; indicates previous exposure    Continue  IV rocephin. Will need therapy for 4 weeks. Poorly controlled Type II DM  - A1C 13.2, management per primary team; continue with insulin therapy     CAD  - continue with ASA and statin      Team available over the weekend if needed.          Dante Booker MD

## 2021-05-29 NOTE — PROGRESS NOTES
[facility]                                                                                          Hospitalist Progress Note  Ailyn Buchanan MD  Answering service: [phone] from in house phone     2021 : Date of admission    Date of Service:  2021  NAME:  Raymon Woodard  :  1975  MRN:  [id number]      Admission Summary:   Cheryle Sinner a 39 y. o. male with past medical history of CAD, tobacco use disorder, non-insulin-dependent type 2 diabetes, dyslipidemia, hypertension who presented to [facility] with multiple complaints. He was admitted to [facility] on 21.  Patient endorsed a 1 month history of recurrent fevers, chills, night sweats, malaise.  He was a cigarette smoker, down from 2 packs prior to his MI last year. [facility]  had no recent travel or sick contacts. Chayito Fat was not up-to-date on Covid vaccination. Patient states that he has a chronic cough which really did not change much prior to presentation. He states that what changed over the past month was that he was having right sided chest pain. This was mostly on the right lateral thorax. Patient was found to have right complex loculated pleural effusion, possible lung abscess. Thoracic surgery was consulted and  took  patient for decortication of the right lung via VATS by thoracic surgery on May 21, 2021.    21: Patient has been taken off airborne respiratory isolation by Infectious Disease today as TB has been ruled out with 2 negative acid fast bacilli smears. Interval history / Subjective:     Patient resting in bed, reports some pain where the chest tubes were inserted, denies any other complaints or problems       Assessment & Plan:     1. Right empyema. antibiotics with Rocephin, appreciate ID input  CT chest 2021  1. Loculated right-sided hydropneumothorax with right chest wall emphysema.   Differentiating postprocedural gas from chest tube removal into the chest wall  versus extension of pleural infection into the chest is difficult  2. Diffuse groundglass and airspace opacities most confluent at the right base  compatible with pneumonia. While portions of the consolidation in the right  lower lobe are minimally improved overall appearance of the lungs has progressed  3. Right hilar and mediastinal adenopathy  CT findings discussed with thoracic surgery, no acute intervention  S/p Decortication of the right lung via VATS by thoracic surgery on May 21, 2021. One CT removed 5/25/21, final chest tube removed today  Will need Rocephin for 4 weeks per ID  AFB negative x3    2. CAD. with stents x 2 to RCA in April, 2020. noncompliance to dual antiplatelet therapy, he developed instent stenosis, that was treated with PCI and furthermore,  had stent placed to the LAD on September 29, 2020. Cont home  meds    3. Hypoalbuminemia. contributing to tachycardia with reduced intravascular volume in the setting of decreased oncotic pressures. S/p IV albumin    4. Persistent tachycardia. Improving with volume. Cont BB, will obtain CT of the chest and echocardiogram    5. DM ty 2   lantus + ISS monitor BG    6. Hypokalemia- repleted K    7. Chr anemia-   Low Iron /B12/folate ok, replace oral and IV Iron, f/u  stool occult blood.  Hgb 6.8 transfuse one unit PRBC today    Code status: full      Care Plan discussed with: Nurse  Anticipated Disposition: Home w/Family  Anticipated Discharge: Greater than 48 hours     Hospital Problems  Never Reviewed        Codes Class Noted POA    [facility] ICD-10-CM: J85.2  ICD-9-CM: 513.0  5/18/2021 Unknown              Current Facility-Administered Medications:     0.9% sodium chloride infusion, 75 mL/hr, IntraVENous, CONTINUOUS, Nathan Crow MD, Last Rate: 75 mL/hr at 05/28/21 1302, 75 mL/hr at 05/28/21 1302    pantoprazole (PROTONIX) 40 mg in 0.9% sodium chloride 10 mL injection, 40 mg, IntraVENous, DAILY, Nathan Crow MD, 40 mg at 05/29/21 0834    insulin lispro (HUMALOG) injection, , SubCUTAneous, AC&HS, Clarisa Alfonso NP, 2 Units at 05/29/21 1774    0.9% sodium chloride infusion 250 mL, 250 mL, IntraVENous, PRN, Lalo Hope MD    COVID-19 vac,Ad26-PF (CRYSTAL) injection 1 Dose, 1 Dose, IntraMUSCular, PRIOR TO DISCHARGE, Lalo Hope MD    cefTRIAXone (ROCEPHIN) 2 g in 0.9% sodium chloride (MBP/ADV) 50 mL MBP, 2 g, IntraVENous, Q24H, Rhea Avila MD, Last Rate: 100 mL/hr at 05/28/21 1715, 2 g at 05/28/21 1715    ferrous sulfate tablet 325 mg, 1 Tablet, Oral, BID WITH MEALS, Lalo Hope MD, 325 mg at 05/29/21 4964    albumin human 25% (BUMINATE) solution 25 g, 25 g, IntraVENous, Q6H, Radha CULVER, DO, 25 g at 05/29/21 2847    aspirin chewable tablet 81 mg, 81 mg, Oral, DAILY, Lalo Hope MD, 81 mg at 05/29/21 0834    metoprolol tartrate (LOPRESSOR) tablet 12.5 mg, 12.5 mg, Oral, BID, Lalo Hope MD, 12.5 mg at 05/29/21 0402    diphenhydrAMINE (BENADRYL) capsule 25 mg, 25 mg, Oral, Q6H PRN, Liudmila Ramsey MD, 25 mg at 05/21/21 0120    insulin glargine (LANTUS) injection 25 Units, 25 Units, SubCUTAneous, QHS, Liudmila Ramsey MD, 25 Units at 05/28/21 2203    sodium chloride (NS) flush 5-40 mL, 5-40 mL, IntraVENous, Q8H, Liudmila Ramsey MD, 10 mL at 05/28/21 1303    sodium chloride (NS) flush 5-40 mL, 5-40 mL, IntraVENous, PRN, Liudmila Ramsey MD    oxyCODONE-acetaminophen (PERCOCET) 5-325 mg per tablet 2 Tablet, 2 Tablet, Oral, Q4H PRN, Liudmila Ramsey, MD, 2 Tablet at 05/27/21 1030    HYDROmorphone (PF) (DILAUDID) injection 1 mg, 1 mg, IntraVENous, Q3H PRN, Liudmila Ramsey MD, 1 mg at 05/29/21 0836    naloxone (NARCAN) injection 0.4 mg, 0.4 mg, IntraVENous, PRN, Liudmila Ramsey MD    enoxaparin (LOVENOX) injection 40 mg, 40 mg, SubCUTAneous, Q24H, Liudmila Ramsey MD, 40 mg at 05/29/21 0834    glucose chewable tablet 16 g, 4 Tablet, Oral, PRN, Lori Cruz MD    dextrose (D50W) injection syrg 12.5-25 g, 12.5-25 g, IntraVENous, PRN, Lori Cruz MD    glucagon [facility]) injection 1 mg, 1 mg, IntraMUSCular, PRN, Lori Cruz MD    atorvastatin (LIPITOR) tablet 80 mg, 80 mg, Oral, DAILY, Lori Cruz MD, 80 mg at 05/29/21 0834    nicotine (NICODERM CQ) 21 mg/24 hr patch 1 Patch, 1 Patch, TransDERmal, DAILY, Lori Cruz MD    polyethylene glycol (MIRALAX) packet 17 g, 17 g, Oral, DAILY PRN, Lori Cruz MD    promethazine (PHENERGAN) tablet 12.5 mg, 12.5 mg, Oral, Q6H PRN **OR** ondansetron (ZOFRAN) injection 4 mg, 4 mg, IntraVENous, Q6H PRN, Lori Cruz MD    melatonin tablet 3 mg, 3 mg, Oral, QHS PRN, Lori Cruz MD, 3 mg at 05/23/21 9518    Review of Systems:   A comprehensive review of systems was negative except for that written in the HPI. Vital Signs:     Patient Vitals for the past 24 hrs:  Patient Vitals for the past 24 hrs:   BP Temp Pulse Resp SpO2   05/22/21 1536 106/63 98.8 °F (37.1 °C) (!) 112 13 92 %   05/22/21 1118 118/67 98.2 °F (36.8 °C) (!) 104 18 93 %   05/22/21 0745 102/60 97.8 °F (36.6 °C) 100 17 92 %         Intake/Output Summary (Last 24 hours) at 5/29/2021 6895  Last data filed at 5/29/2021 7910  Gross per 24 hour   Intake    Output 2150 ml   Net -2150 ml        Physical Examination:     Estimated body mass index is 28.73 kg/m² as calculated from the following:    Height as of this encounter: 5' 6\" (1.676 m). Weight as of this encounter: 80.7 kg (178 lb). Estimated body mass index is 28.73 kg/m² as calculated from the following:    Height as of this encounter: 5' 6\" (1.676 m). Weight as of this encounter: 80.7 kg (178 lb). General: In no acute distress. Well developed, well nourished. Head: Normocephalic, atraumatic. ENT:   Oral mucosa moist.  Neck: Supple. Heart: Regular rhythm. Tachycardic.    Chest: Symmetrical expansion decreased basal breath sounds  Abdomen: Soft, nontender. No abnormal distention. Extremities:  No edema, no cyanosis. Neurological:   Alert, oriented X3. Skin: No jaundice. Data Review:       Labs:     Recent Labs     05/29/21 0359 05/28/21  0510   WBC 13.5* 13.3*   HGB 8.1* 8.6*   HCT 26.7* 28.0*   * 508*     Recent Labs     05/29/21 0359 05/28/21  0510 05/27/21  0541    135* 137   K 3.5 3.6 3.5    103 104   CO2 25 25 24   BUN 15 14 11   CREA 0.99 0.99 0.92   * 191* 151*   CA 9.3 9.5 9.5     No results for input(s): ALT, AP, TBIL, TBILI, TP, ALB, GLOB, GGT, AML, LPSE in the last 72 hours. No lab exists for component: SGOT, GPT, AMYP, HLPSE  CXR 5/25  IMPRESSION  Stable small right pleural effusion and right basilar opacity. No  pneumothorax following removal of one of the chest tubes.    ______________________________________________________________________  EXPECTED LENGTH OF STAY: 4d 9h  ACTUAL LENGTH OF STAY:          Elvin Jenkins MD

## 2021-05-29 NOTE — PROGRESS NOTES
Bedside and Verbal shift change report given to april (oncoming nurse) by Len Valdovinos (offgoing nurse).  Report included the following information SBAR, Kardex, Intake/Output, MAR, Recent Results and Cardiac Rhythm SR - ST.

## 2021-05-30 LAB
ANION GAP SERPL CALC-SCNC: 8 MMOL/L (ref 5–15)
BUN SERPL-MCNC: 16 MG/DL (ref 6–20)
BUN/CREAT SERPL: 18 (ref 12–20)
CALCIUM SERPL-MCNC: 9.5 MG/DL (ref 8.5–10.1)
CHLORIDE SERPL-SCNC: 107 MMOL/L (ref 97–108)
CO2 SERPL-SCNC: 23 MMOL/L (ref 21–32)
CREAT SERPL-MCNC: 0.9 MG/DL (ref 0.7–1.3)
ERYTHROCYTE [DISTWIDTH] IN BLOOD BY AUTOMATED COUNT: 15.6 % (ref 11.5–14.5)
GLUCOSE BLD STRIP.AUTO-MCNC: 157 MG/DL (ref 65–117)
GLUCOSE BLD STRIP.AUTO-MCNC: 184 MG/DL (ref 65–117)
GLUCOSE BLD STRIP.AUTO-MCNC: 186 MG/DL (ref 65–117)
GLUCOSE BLD STRIP.AUTO-MCNC: 197 MG/DL (ref 65–117)
GLUCOSE SERPL-MCNC: 160 MG/DL (ref 65–100)
HCT VFR BLD AUTO: 25 % (ref 36.6–50.3)
HGB BLD-MCNC: 7.5 G/DL (ref 12.1–17)
MCH RBC QN AUTO: 25.3 PG (ref 26–34)
MCHC RBC AUTO-ENTMCNC: 30 G/DL (ref 30–36.5)
MCV RBC AUTO: 84.5 FL (ref 80–99)
NRBC # BLD: 0 K/UL (ref 0–0.01)
NRBC BLD-RTO: 0 PER 100 WBC
PLATELET # BLD AUTO: 444 K/UL (ref 150–400)
PMV BLD AUTO: 9.6 FL (ref 8.9–12.9)
POTASSIUM SERPL-SCNC: 3.5 MMOL/L (ref 3.5–5.1)
RBC # BLD AUTO: 2.96 M/UL (ref 4.1–5.7)
SERVICE CMNT-IMP: ABNORMAL
SODIUM SERPL-SCNC: 138 MMOL/L (ref 136–145)
WBC # BLD AUTO: 11.5 K/UL (ref 4.1–11.1)

## 2021-05-30 PROCEDURE — 65660000000 HC RM CCU STEPDOWN

## 2021-05-30 PROCEDURE — 74011250637 HC RX REV CODE- 250/637: Performed by: HOSPITALIST

## 2021-05-30 PROCEDURE — 80048 BASIC METABOLIC PNL TOTAL CA: CPT

## 2021-05-30 PROCEDURE — 85027 COMPLETE CBC AUTOMATED: CPT

## 2021-05-30 PROCEDURE — 82962 GLUCOSE BLOOD TEST: CPT

## 2021-05-30 PROCEDURE — P9047 ALBUMIN (HUMAN), 25%, 50ML: HCPCS | Performed by: INTERNAL MEDICINE

## 2021-05-30 PROCEDURE — 36415 COLL VENOUS BLD VENIPUNCTURE: CPT

## 2021-05-30 PROCEDURE — 74011000258 HC RX REV CODE- 258: Performed by: INTERNAL MEDICINE

## 2021-05-30 PROCEDURE — 74011636637 HC RX REV CODE- 636/637: Performed by: NURSE PRACTITIONER

## 2021-05-30 PROCEDURE — 74011000250 HC RX REV CODE- 250: Performed by: FAMILY MEDICINE

## 2021-05-30 PROCEDURE — 74011250636 HC RX REV CODE- 250/636: Performed by: INTERNAL MEDICINE

## 2021-05-30 PROCEDURE — 74011636637 HC RX REV CODE- 636/637: Performed by: THORACIC SURGERY (CARDIOTHORACIC VASCULAR SURGERY)

## 2021-05-30 PROCEDURE — C9113 INJ PANTOPRAZOLE SODIUM, VIA: HCPCS | Performed by: FAMILY MEDICINE

## 2021-05-30 PROCEDURE — 74011250636 HC RX REV CODE- 250/636: Performed by: THORACIC SURGERY (CARDIOTHORACIC VASCULAR SURGERY)

## 2021-05-30 PROCEDURE — 74011250637 HC RX REV CODE- 250/637: Performed by: THORACIC SURGERY (CARDIOTHORACIC VASCULAR SURGERY)

## 2021-05-30 PROCEDURE — 74011250636 HC RX REV CODE- 250/636: Performed by: FAMILY MEDICINE

## 2021-05-30 RX ADMIN — INSULIN LISPRO 2 UNITS: 100 INJECTION, SOLUTION INTRAVENOUS; SUBCUTANEOUS at 17:59

## 2021-05-30 RX ADMIN — INSULIN LISPRO 2 UNITS: 100 INJECTION, SOLUTION INTRAVENOUS; SUBCUTANEOUS at 06:53

## 2021-05-30 RX ADMIN — HYDROMORPHONE HYDROCHLORIDE 1 MG: 1 INJECTION, SOLUTION INTRAMUSCULAR; INTRAVENOUS; SUBCUTANEOUS at 03:52

## 2021-05-30 RX ADMIN — ALBUMIN (HUMAN) 25 G: 0.25 INJECTION, SOLUTION INTRAVENOUS at 23:01

## 2021-05-30 RX ADMIN — CEFTRIAXONE SODIUM 2 G: 2 INJECTION, POWDER, FOR SOLUTION INTRAMUSCULAR; INTRAVENOUS at 17:59

## 2021-05-30 RX ADMIN — INSULIN LISPRO 2 UNITS: 100 INJECTION, SOLUTION INTRAVENOUS; SUBCUTANEOUS at 12:07

## 2021-05-30 RX ADMIN — SODIUM CHLORIDE 75 ML/HR: 9 INJECTION, SOLUTION INTRAVENOUS at 14:13

## 2021-05-30 RX ADMIN — METOPROLOL TARTRATE 12.5 MG: 25 TABLET, FILM COATED ORAL at 08:57

## 2021-05-30 RX ADMIN — INSULIN GLARGINE 25 UNITS: 100 INJECTION, SOLUTION SUBCUTANEOUS at 21:13

## 2021-05-30 RX ADMIN — ATORVASTATIN CALCIUM 80 MG: 40 TABLET, FILM COATED ORAL at 08:57

## 2021-05-30 RX ADMIN — HYDROMORPHONE HYDROCHLORIDE 1 MG: 1 INJECTION, SOLUTION INTRAMUSCULAR; INTRAVENOUS; SUBCUTANEOUS at 12:07

## 2021-05-30 RX ADMIN — HYDROMORPHONE HYDROCHLORIDE 1 MG: 1 INJECTION, SOLUTION INTRAMUSCULAR; INTRAVENOUS; SUBCUTANEOUS at 08:57

## 2021-05-30 RX ADMIN — ASPIRIN 81 MG: 81 TABLET, CHEWABLE ORAL at 08:57

## 2021-05-30 RX ADMIN — METOPROLOL TARTRATE 12.5 MG: 25 TABLET, FILM COATED ORAL at 21:13

## 2021-05-30 RX ADMIN — HYDROMORPHONE HYDROCHLORIDE 1 MG: 1 INJECTION, SOLUTION INTRAMUSCULAR; INTRAVENOUS; SUBCUTANEOUS at 17:59

## 2021-05-30 RX ADMIN — ALBUMIN (HUMAN) 25 G: 0.25 INJECTION, SOLUTION INTRAVENOUS at 06:53

## 2021-05-30 RX ADMIN — SODIUM CHLORIDE 40 MG: 9 INJECTION INTRAMUSCULAR; INTRAVENOUS; SUBCUTANEOUS at 08:57

## 2021-05-30 RX ADMIN — ALBUMIN (HUMAN) 25 G: 0.25 INJECTION, SOLUTION INTRAVENOUS at 12:07

## 2021-05-30 RX ADMIN — HYDROMORPHONE HYDROCHLORIDE 1 MG: 1 INJECTION, SOLUTION INTRAMUSCULAR; INTRAVENOUS; SUBCUTANEOUS at 21:13

## 2021-05-30 RX ADMIN — FERROUS SULFATE TAB 325 MG (65 MG ELEMENTAL FE) 325 MG: 325 (65 FE) TAB at 08:57

## 2021-05-30 RX ADMIN — FERROUS SULFATE TAB 325 MG (65 MG ELEMENTAL FE) 325 MG: 325 (65 FE) TAB at 17:59

## 2021-05-30 RX ADMIN — ENOXAPARIN SODIUM 40 MG: 40 INJECTION SUBCUTANEOUS at 08:57

## 2021-05-30 RX ADMIN — ALBUMIN (HUMAN) 25 G: 0.25 INJECTION, SOLUTION INTRAVENOUS at 17:59

## 2021-05-30 NOTE — PROGRESS NOTES
Problem: Pain  Goal: *Control of Pain  Outcome: Progressing Towards Goal     Problem: Patient Education: Go to Patient Education Activity  Goal: Patient/Family Education  Outcome: Progressing Towards Goal     Problem: Risk for Spread of Infection  Goal: Prevent transmission of infectious organism to others  Description: Prevent the transmission of infectious organisms to other patients, staff members, and visitors. Outcome: Progressing Towards Goal     Problem: Patient Education:  Go to Education Activity  Goal: Patient/Family Education  Outcome: Progressing Towards Goal     Problem: Falls - Risk of  Goal: *Absence of Falls  Description: Document Kelley Mccray Fall Risk and appropriate interventions in the flowsheet. Outcome: Progressing Towards Goal  Note: Fall Risk Interventions:  Mobility Interventions: Communicate number of staff needed for ambulation/transfer         Medication Interventions: Patient to call before getting OOB, Evaluate medications/consider consulting pharmacy         History of Falls Interventions: Door open when patient unattended         Problem: Patient Education: Go to Patient Education Activity  Goal: Patient/Family Education  Outcome: Progressing Towards Goal     Problem: Diabetes Self-Management  Goal: *Disease process and treatment process  Description: Define diabetes and identify own type of diabetes; list 3 options for treating diabetes. Outcome: Progressing Towards Goal  Goal: *Incorporating nutritional management into lifestyle  Description: Describe effect of type, amount and timing of food on blood glucose; list 3 methods for planning meals. Outcome: Progressing Towards Goal  Goal: *Incorporating physical activity into lifestyle  Description: State effect of exercise on blood glucose levels.   Outcome: Progressing Towards Goal  Goal: *Developing strategies to promote health/change behavior  Description: Define the ABC's of diabetes; identify appropriate screenings, schedule and personal plan for screenings. Outcome: Progressing Towards Goal  Goal: *Using medications safely  Description: State effect of diabetes medications on diabetes; name diabetes medication taking, action and side effects. Outcome: Progressing Towards Goal  Goal: *Monitoring blood glucose, interpreting and using results  Description: Identify recommended blood glucose targets  and personal targets. Outcome: Progressing Towards Goal  Goal: *Prevention, detection, treatment of acute complications  Description: List symptoms of hyper- and hypoglycemia; describe how to treat low blood sugar and actions for lowering  high blood glucose level. Outcome: Progressing Towards Goal  Goal: *Prevention, detection and treatment of chronic complications  Description: Define the natural course of diabetes and describe the relationship of blood glucose levels to long term complications of diabetes.   Outcome: Progressing Towards Goal  Goal: *Developing strategies to address psychosocial issues  Description: Describe feelings about living with diabetes; identify support needed and support network  Outcome: Progressing Towards Goal  Goal: *Insulin pump training  Outcome: Progressing Towards Goal  Goal: *Sick day guidelines  Outcome: Progressing Towards Goal  Goal: *Patient Specific Goal (EDIT GOAL, INSERT TEXT)  Outcome: Progressing Towards Goal     Problem: Patient Education: Go to Patient Education Activity  Goal: Patient/Family Education  Outcome: Progressing Towards Goal     Problem: Breathing Pattern - Ineffective  Goal: *Absence of hypoxia  Outcome: Progressing Towards Goal  Goal: *Use of effective breathing techniques  Outcome: Progressing Towards Goal  Goal: *PALLIATIVE CARE:  Alleviation of Dyspnea  Outcome: Progressing Towards Goal     Problem: Patient Education: Go to Patient Education Activity  Goal: Patient/Family Education  Outcome: Progressing Towards Goal

## 2021-05-30 NOTE — ROUTINE PROCESS
Bedside and Verbal shift change report given to April (oncoming nurse) by Dearl Robert (offgoing nurse). Report included the following information SBAR, Kardex, Intake/Output, MAR, Recent Results and Cardiac Rhythm NSR.

## 2021-05-30 NOTE — PROGRESS NOTES
[address] [facility] Adult  Hospitalist Group                                                                                          Hospitalist Progress Note  Lindsey Barnes MD  Answering service: [phone] from in house phone     2021 : Date of admission    Date of Service:  2021  NAME:  Marleni Ross  :  1975  MRN:  [id number]      Admission Summary:   Alycia Tsang is a 39 y. o. male with past medical history of CAD, tobacco use disorder, non-insulin-dependent type 2 diabetes, dyslipidemia, hypertension who presented to [facility] with multiple complaints. He was [facility] [place] on 21.  Patient endorsed a 1 month history of recurrent fevers, chills, night sweats, malaise.  He was a cigarette smoker, down from 2 packs prior to his MI last year. [facility]  had no recent travel or sick contacts. Michelle Garza was not up-to-date on Covid vaccination. Patient states that he has a chronic cough which really did not change much prior to presentation. He states that what changed over the past month was that he was having right sided chest pain. This was mostly on the right lateral thorax. Patient was found to have right complex loculated pleural effusion, possible lung abscess. Thoracic surgery was consulted and  took  patient for decortication of the right lung via VATS by thoracic surgery on May 21, 2021.    21: Patient has been taken off airborne respiratory isolation by Infectious Disease today as TB has been ruled out with 2 negative acid fast bacilli smears. Interval history / Subjective:     Patient was seen and examined. No acute events overnight. \"Feeling okay today\"       Assessment & Plan:     1. Right empyema. antibiotics with Rocephin, appreciate ID input to complete 4 weeks  CT chest 2021  1. Loculated right-sided hydropneumothorax with right chest wall emphysema.   Differentiating postprocedural gas from chest tube removal into the chest wall  versus extension of pleural infection into the chest is difficult  2. Diffuse groundglass and airspace opacities most confluent at the right base  compatible with pneumonia. While portions of the consolidation in the right  lower lobe are minimally improved overall appearance of the lungs has progressed  3. Right hilar and mediastinal adenopathy  CT findings discussed with thoracic surgery, no acute intervention  S/p Decortication of the right lung via VATS by thoracic surgery on May 21, 2021. One CT removed 5/25/21, final chest tube removed today  Will need Rocephin for 4 weeks per ID. Will order PICC line  AFB negative x3    2. CAD. with stents x 2 to RCA in April, 2020. noncompliance to dual antiplatelet therapy, he developed instent stenosis, that was treated with PCI and furthermore,  had stent placed to the LAD on September 29, 2020. Cont home  meds    3. Hypoalbuminemia. contributing to tachycardia with reduced intravascular volume in the setting of decreased oncotic pressures. S/p IV albumin    4. Persistent tachycardia. Improving with volume. Cont BB, will obtain CT of the chest and echocardiogram    5. DM ty 2   lantus + ISS monitor BG    6. Hypokalemia- repleted K    7. Chr anemia-   Low Iron /B12/folate ok, replace oral and IV Iron, f/u  stool occult blood. Hgb 6.8 transfuse one unit PRBC today    Code status: full      Care Plan discussed with: Nurse  Anticipated Disposition: Patient is concerned that he can take care of himself at home because he lives with his brother and mother and they both have disability as he said he would like to go to nursing facility if possible.   Anticipated Discharge: Greater than 48 hours     Hospital Problems  Never Reviewed        Codes Class Noted POA    Lung abscess ([facility] [address] ICD-10-CM: J85.2  ICD-9-CM: 513.0  5/18/2021 Unknown              Current Facility-Administered Medications:     0.9% sodium chloride infusion, 75 mL/hr, IntraVENous, CONTINUOUS, Lakhwinder Munguia MD, Last Rate: 75 mL/hr at 05/29/21 1646, 75 mL/hr at 05/29/21 1646    pantoprazole (PROTONIX) 40 mg in 0.9% sodium chloride 10 mL injection, 40 mg, IntraVENous, DAILY, Nathan Crow MD, 40 mg at 05/30/21 0857    insulin lispro (HUMALOG) injection, , SubCUTAneous, AC&HS, Thompson Camara, JENNIFER, 2 Units at 05/30/21 0883    0.9% sodium chloride infusion 250 mL, 250 mL, IntraVENous, PRN, Lalo Hope MD    COVID-19 vac,Ad26-PF (CRYSTAL) injection 1 Dose, 1 Dose, IntraMUSCular, PRIOR TO DISCHARGE, Lalo Hope MD    cefTRIAXone (ROCEPHIN) 2 g in 0.9% sodium chloride (MBP/ADV) 50 mL MBP, 2 g, IntraVENous, Q24H, Elizabeth Houston MD, Last Rate: 100 mL/hr at 05/29/21 1828, 2 g at 05/29/21 1828    ferrous sulfate tablet 325 mg, 1 Tablet, Oral, BID WITH MEALS, Lalo Hope MD, 325 mg at 05/30/21 0857    albumin human 25% (BUMINATE) solution 25 g, 25 g, IntraVENous, Q6H, Lm Centeno H, DO, 25 g at 05/30/21 2685    aspirin chewable tablet 81 mg, 81 mg, Oral, DAILY, Lalo Hope MD, 81 mg at 05/30/21 0857    metoprolol tartrate (LOPRESSOR) tablet 12.5 mg, 12.5 mg, Oral, BID, Lalo Hope MD, 12.5 mg at 05/30/21 0857    diphenhydrAMINE (BENADRYL) capsule 25 mg, 25 mg, Oral, Q6H PRN, Gregory Díaz MD, 25 mg at 05/21/21 0120    insulin glargine (LANTUS) injection 25 Units, 25 Units, SubCUTAneous, QHS, Gregory Díaz MD, 25 Units at 05/29/21 2138    sodium chloride (NS) flush 5-40 mL, 5-40 mL, IntraVENous, Q8H, Gregory Díaz MD, 10 mL at 05/28/21 1303    sodium chloride (NS) flush 5-40 mL, 5-40 mL, IntraVENous, PRN, Gregory Díaz MD    oxyCODONE-acetaminophen (PERCOCET) 5-325 mg per tablet 2 Tablet, 2 Tablet, Oral, Q4H PRN, Gregory Díaz MD, 2 Tablet at 05/27/21 1030    HYDROmorphone (PF) (DILAUDID) injection 1 mg, 1 mg, IntraVENous, Q3H PRN, Gregory Díaz MD, 1 mg at 05/30/21 0857    naloxone (NARCAN) injection 0.4 mg, 0.4 mg, IntraVENous, PRN, Gentry Pearl MD    enoxaparin (LOVENOX) injection 40 mg, 40 mg, SubCUTAneous, Q24H, Gentry Pearl MD, 40 mg at 05/30/21 0857    glucose chewable tablet 16 g, 4 Tablet, Oral, PRN, Gentry Pearl MD    dextrose (D50W) injection syrg 12.5-25 g, 12.5-25 g, IntraVENous, PRN, Gentyr Pearl MD    glucagon [facility]) injection 1 mg, 1 mg, IntraMUSCular, PRN, Gentry Pearl MD    atorvastatin (LIPITOR) tablet 80 mg, 80 mg, Oral, DAILY, Gentry Pearl MD, 80 mg at 05/30/21 0857    nicotine (NICODERM CQ) 21 mg/24 hr patch 1 Patch, 1 Patch, TransDERmal, DAILY, Gentry Pearl MD    polyethylene glycol (MIRALAX) packet 17 g, 17 g, Oral, DAILY PRN, Gentry Pearl MD    promethazine (PHENERGAN) tablet 12.5 mg, 12.5 mg, Oral, Q6H PRN **OR** ondansetron (ZOFRAN) injection 4 mg, 4 mg, IntraVENous, Q6H PRN, Gentry Pearl MD    melatonin tablet 3 mg, 3 mg, Oral, QHS PRN, Gentry Pearl MD, 3 mg at 05/23/21 7093    Review of Systems:   A comprehensive review of systems was negative except for that written in the HPI. Vital Signs:     Patient Vitals for the past 24 hrs:  Patient Vitals for the past 24 hrs:   BP Temp Pulse Resp SpO2   05/22/21 1536 106/63 98.8 °F (37.1 °C) (!) 112 13 92 %   05/22/21 1118 118/67 98.2 °F (36.8 °C) (!) 104 18 93 %   05/22/21 0745 102/60 97.8 °F (36.6 °C) 100 17 92 %       No intake or output data in the 24 hours ending 05/30/21 1138     Physical Examination:     Estimated body mass index is 28.54 kg/m² as calculated from the following:    Height as of this encounter: 5' 6\" (1.676 m). Weight as of this encounter: 80.2 kg (176 lb 12.9 oz). Estimated body mass index is 28.54 kg/m² as calculated from the following:    Height as of this encounter: 5' 6\" (1.676 m). Weight as of this encounter: 80.2 kg (176 lb 12.9 oz). General: In no acute distress. Well developed, well nourished.   Head: Normocephalic, atraumatic. ENT:   Oral mucosa moist.  Neck: Supple. Heart: Regular rhythm. Tachycardic. Chest: Symmetrical expansion decreased basal breath sounds  Abdomen: Soft, nontender. No abnormal distention. Extremities:  No edema, no cyanosis. Neurological:   Alert, oriented X3. Skin: No jaundice. Data Review:       Labs:     Recent Labs     05/30/21 0351 05/29/21  0359   WBC 11.5* 13.5*   HGB 7.5* 8.1*   HCT 25.0* 26.7*   * 498*     Recent Labs     05/30/21  0351 05/29/21  0359 05/28/21  0510    137 135*   K 3.5 3.5 3.6    105 103   CO2 23 25 25   BUN 16 15 14   CREA 0.90 0.99 0.99   * 166* 191*   CA 9.5 9.3 9.5     No results for input(s): ALT, AP, TBIL, TBILI, TP, ALB, GLOB, GGT, AML, LPSE in the last 72 hours. No lab exists for component: SGOT, GPT, AMYP, HLPSE  CXR 5/25  IMPRESSION  Stable small right pleural effusion and right basilar opacity. No  pneumothorax following removal of one of the chest tubes.    ______________________________________________________________________  EXPECTED LENGTH OF STAY: 4d 9h  ACTUAL LENGTH OF STAY:          Francy Saenz MD

## 2021-05-30 NOTE — PROGRESS NOTES
Bedside shift change report given to Cordelia Nissen, RN (oncoming nurse) by April RN (offgoing nurse).  Report included the following information SBAR, Kardex, Intake/Output, MAR, Accordion and Cardiac Rhythm NSR-ST.

## 2021-05-31 LAB
GLUCOSE BLD STRIP.AUTO-MCNC: 170 MG/DL (ref 65–117)
GLUCOSE BLD STRIP.AUTO-MCNC: 200 MG/DL (ref 65–117)
GLUCOSE BLD STRIP.AUTO-MCNC: 204 MG/DL (ref 65–117)
GLUCOSE BLD STRIP.AUTO-MCNC: 237 MG/DL (ref 65–117)
SERVICE CMNT-IMP: ABNORMAL

## 2021-05-31 PROCEDURE — 74011250637 HC RX REV CODE- 250/637: Performed by: THORACIC SURGERY (CARDIOTHORACIC VASCULAR SURGERY)

## 2021-05-31 PROCEDURE — 65660000000 HC RM CCU STEPDOWN

## 2021-05-31 PROCEDURE — 82962 GLUCOSE BLOOD TEST: CPT

## 2021-05-31 PROCEDURE — 74011250637 HC RX REV CODE- 250/637: Performed by: HOSPITALIST

## 2021-05-31 PROCEDURE — C9113 INJ PANTOPRAZOLE SODIUM, VIA: HCPCS | Performed by: FAMILY MEDICINE

## 2021-05-31 PROCEDURE — 74011250636 HC RX REV CODE- 250/636: Performed by: FAMILY MEDICINE

## 2021-05-31 PROCEDURE — 74011000258 HC RX REV CODE- 258: Performed by: INTERNAL MEDICINE

## 2021-05-31 PROCEDURE — 74011250636 HC RX REV CODE- 250/636: Performed by: THORACIC SURGERY (CARDIOTHORACIC VASCULAR SURGERY)

## 2021-05-31 PROCEDURE — 74011636637 HC RX REV CODE- 636/637: Performed by: NURSE PRACTITIONER

## 2021-05-31 PROCEDURE — 65270000029 HC RM PRIVATE

## 2021-05-31 PROCEDURE — 74011000250 HC RX REV CODE- 250: Performed by: FAMILY MEDICINE

## 2021-05-31 PROCEDURE — 74011250636 HC RX REV CODE- 250/636: Performed by: INTERNAL MEDICINE

## 2021-05-31 PROCEDURE — P9047 ALBUMIN (HUMAN), 25%, 50ML: HCPCS | Performed by: INTERNAL MEDICINE

## 2021-05-31 PROCEDURE — 74011636637 HC RX REV CODE- 636/637: Performed by: THORACIC SURGERY (CARDIOTHORACIC VASCULAR SURGERY)

## 2021-05-31 RX ADMIN — INSULIN LISPRO 2 UNITS: 100 INJECTION, SOLUTION INTRAVENOUS; SUBCUTANEOUS at 06:39

## 2021-05-31 RX ADMIN — ATORVASTATIN CALCIUM 80 MG: 40 TABLET, FILM COATED ORAL at 08:55

## 2021-05-31 RX ADMIN — INSULIN LISPRO 2 UNITS: 100 INJECTION, SOLUTION INTRAVENOUS; SUBCUTANEOUS at 21:53

## 2021-05-31 RX ADMIN — CEFTRIAXONE SODIUM 2 G: 2 INJECTION, POWDER, FOR SOLUTION INTRAMUSCULAR; INTRAVENOUS at 18:45

## 2021-05-31 RX ADMIN — FERROUS SULFATE TAB 325 MG (65 MG ELEMENTAL FE) 325 MG: 325 (65 FE) TAB at 17:15

## 2021-05-31 RX ADMIN — HYDROMORPHONE HYDROCHLORIDE 1 MG: 1 INJECTION, SOLUTION INTRAMUSCULAR; INTRAVENOUS; SUBCUTANEOUS at 20:10

## 2021-05-31 RX ADMIN — INSULIN GLARGINE 25 UNITS: 100 INJECTION, SOLUTION SUBCUTANEOUS at 22:00

## 2021-05-31 RX ADMIN — ASPIRIN 81 MG: 81 TABLET, CHEWABLE ORAL at 08:55

## 2021-05-31 RX ADMIN — HYDROMORPHONE HYDROCHLORIDE 1 MG: 1 INJECTION, SOLUTION INTRAMUSCULAR; INTRAVENOUS; SUBCUTANEOUS at 09:05

## 2021-05-31 RX ADMIN — HYDROMORPHONE HYDROCHLORIDE 1 MG: 1 INJECTION, SOLUTION INTRAMUSCULAR; INTRAVENOUS; SUBCUTANEOUS at 13:06

## 2021-05-31 RX ADMIN — ALBUMIN (HUMAN) 25 G: 0.25 INJECTION, SOLUTION INTRAVENOUS at 11:58

## 2021-05-31 RX ADMIN — ALBUMIN (HUMAN) 25 G: 0.25 INJECTION, SOLUTION INTRAVENOUS at 05:40

## 2021-05-31 RX ADMIN — SODIUM CHLORIDE 40 MG: 9 INJECTION INTRAMUSCULAR; INTRAVENOUS; SUBCUTANEOUS at 08:55

## 2021-05-31 RX ADMIN — METOPROLOL TARTRATE 12.5 MG: 25 TABLET, FILM COATED ORAL at 08:55

## 2021-05-31 RX ADMIN — HYDROMORPHONE HYDROCHLORIDE 1 MG: 1 INJECTION, SOLUTION INTRAMUSCULAR; INTRAVENOUS; SUBCUTANEOUS at 17:15

## 2021-05-31 RX ADMIN — SODIUM CHLORIDE 75 ML/HR: 9 INJECTION, SOLUTION INTRAVENOUS at 08:55

## 2021-05-31 RX ADMIN — HYDROMORPHONE HYDROCHLORIDE 1 MG: 1 INJECTION, SOLUTION INTRAMUSCULAR; INTRAVENOUS; SUBCUTANEOUS at 05:41

## 2021-05-31 RX ADMIN — HYDROMORPHONE HYDROCHLORIDE 1 MG: 1 INJECTION, SOLUTION INTRAMUSCULAR; INTRAVENOUS; SUBCUTANEOUS at 01:46

## 2021-05-31 RX ADMIN — FERROUS SULFATE TAB 325 MG (65 MG ELEMENTAL FE) 325 MG: 325 (65 FE) TAB at 08:55

## 2021-05-31 RX ADMIN — INSULIN LISPRO 3 UNITS: 100 INJECTION, SOLUTION INTRAVENOUS; SUBCUTANEOUS at 17:15

## 2021-05-31 RX ADMIN — INSULIN LISPRO 3 UNITS: 100 INJECTION, SOLUTION INTRAVENOUS; SUBCUTANEOUS at 11:58

## 2021-05-31 RX ADMIN — ALBUMIN (HUMAN) 25 G: 0.25 INJECTION, SOLUTION INTRAVENOUS at 17:15

## 2021-05-31 RX ADMIN — METOPROLOL TARTRATE 12.5 MG: 25 TABLET, FILM COATED ORAL at 21:53

## 2021-05-31 NOTE — PROGRESS NOTES
[address] [facility] Adult  Hospitalist Group                                                                                          Hospitalist Progress Note  Sal Duarte MD  Answering service: [phone] from in house phone     2021 : Date of admission    Date of Service:  2021  NAME:  Maddie De Guzman  :  1975  MRN:  [id number]      Admission Summary:   Charli Tsang is a 39 y. o. male with past medical history of CAD, tobacco use disorder, non-insulin-dependent type 2 diabetes, dyslipidemia, hypertension who presented to [facility] with multiple complaints. He was admitted to [facility] on 21.  Patient endorsed a 1 month history of recurrent fevers, chills, night sweats, malaise.  He was a cigarette smoker, down from 2 packs prior to his MI last year. [facility]  had no recent travel or sick contacts. Marylou Huynh was not up-to-date on Covid vaccination. Patient states that he has a chronic cough which really did not change much prior to presentation. He states that what changed over the past month was that he was having right sided chest pain. This was mostly on the right lateral thorax. Patient was found to have right complex loculated pleural effusion, possible lung abscess. Thoracic surgery was consulted and  took  patient for decortication of the right lung via VATS by thoracic surgery on May 21, 2021.    21: Patient has been taken off airborne respiratory isolation by Infectious Disease today as TB has been ruled out with 2 negative acid fast bacilli smears. Interval history / Subjective:     Patient was seen and examined. No acute events overnight. \"Nothing much just waiting\"       Assessment & Plan:     1. Right empyema. antibiotics with Rocephin, appreciate ID input to complete 4 weeks  CT chest 2021  1. Loculated right-sided hydropneumothorax with right chest wall emphysema.   Differentiating postprocedural gas from chest tube removal into the chest wall  versus extension of pleural infection into the chest is difficult  2. Diffuse groundglass and airspace opacities most confluent at the right base  compatible with pneumonia. While portions of the consolidation in the right  lower lobe are minimally improved overall appearance of the lungs has progressed  3. Right hilar and mediastinal adenopathy  CT findings discussed with thoracic surgery, no acute intervention  S/p Decortication of the right lung via VATS by thoracic surgery on May 21, 2021. One CT removed 5/25/21, final chest tube removed today  Will need Rocephin for 4 weeks per ID. PICC line ordered to be placed on May 30  AFB negative x3    2. CAD. with stents x 2 to RCA in April, 2020. noncompliance to dual antiplatelet therapy, he developed instent stenosis, that was treated with PCI and furthermore,  had stent placed to the LAD on September 29, 2020. Cont home  meds    3. Hypoalbuminemia. contributing to tachycardia with reduced intravascular volume in the setting of decreased oncotic pressures. S/p IV albumin    4. Persistent tachycardia. Improving with volume. Cont BB, will obtain CT of the chest and echocardiogram    5. DM ty 2   lantus + ISS monitor BG    6. Hypokalemia- repleted K    7. Chr anemia-   Low Iron /B12/folate ok, replace oral and IV Iron, f/u  stool occult blood. Hgb 6.8 transfuse one unit PRBC today    Code status: full      Care Plan discussed with: Nurse  Anticipated Disposition: Patient is concerned that he can take care of himself at home because he lives with his brother and mother and they both have disability as he said he would like to go to nursing facility if possible. He is awaiting placement to SNF. Patient is medically stable to be discharged to SNF once bed is available.   Anticipated Discharge: Greater than 48 hours     Hospital Problems  Never Reviewed        Codes Class Noted POA    Lung abscess ([facility] [address] ICD-10-CM: J85.2  ICD-9-CM: 513.0  5/18/2021 Unknown              Current Facility-Administered Medications:     0.9% sodium chloride infusion, 75 mL/hr, IntraVENous, CONTINUOUS, Chacorta Helms MD, Last Rate: 75 mL/hr at 05/31/21 0855, 75 mL/hr at 05/31/21 0855    pantoprazole (PROTONIX) 40 mg in 0.9% sodium chloride 10 mL injection, 40 mg, IntraVENous, DAILY, Chacorta Helms MD, 40 mg at 05/31/21 0855    insulin lispro (HUMALOG) injection, , SubCUTAneous, AC&HS, Gisela Putt, NP, 3 Units at 05/31/21 1158    0.9% sodium chloride infusion 250 mL, 250 mL, IntraVENous, PRN, Lalo Hope MD    COVID-19 vac,Ad26-PF ([employer]) injection 1 Dose, 1 Dose, IntraMUSCular, PRIOR TO DISCHARGE, Lalo Hope MD    cefTRIAXone (ROCEPHIN) 2 g in 0.9% sodium chloride (MBP/ADV) 50 mL MBP, 2 g, IntraVENous, Q24H, Caryn Adrian MD, Last Rate: 100 mL/hr at 05/30/21 1759, 2 g at 05/30/21 1759    ferrous sulfate tablet 325 mg, 1 Tablet, Oral, BID WITH MEALS, Lalo Hope MD, 325 mg at 05/31/21 0855    albumin human 25% (BUMINATE) solution 25 g, 25 g, IntraVENous, Q6H, Lm Centeno DO, 25 g at 05/31/21 1158    aspirin chewable tablet 81 mg, 81 mg, Oral, DAILY, Lalo Hope MD, 81 mg at 05/31/21 0855    metoprolol tartrate (LOPRESSOR) tablet 12.5 mg, 12.5 mg, Oral, BID, Lalo Hope MD, 12.5 mg at 05/31/21 0855    diphenhydrAMINE (BENADRYL) capsule 25 mg, 25 mg, Oral, Q6H PRN, Kary Zhang MD, 25 mg at 05/21/21 0120    insulin glargine (LANTUS) injection 25 Units, 25 Units, SubCUTAneous, QHS, Kary Zhang MD, 25 Units at 05/30/21 2113    sodium chloride (NS) flush 5-40 mL, 5-40 mL, IntraVENous, Q8H, Kary Zhang MD, 10 mL at 05/28/21 1303    sodium chloride (NS) flush 5-40 mL, 5-40 mL, IntraVENous, PRN, Kary Zhang MD    oxyCODONE-acetaminophen (PERCOCET) 5-325 mg per tablet 2 Tablet, 2 Tablet, Oral, Q4H PRN, Kary Zhang MD, 2 Tablet at 05/27/21 1030    HYDROmorphone (PF) (DILAUDID) injection 1 mg, 1 mg, IntraVENous, Q3H PRN, Liudmila Ramsey MD, 1 mg at 05/31/21 0905    naloxone [facility]) injection 0.4 mg, 0.4 mg, IntraVENous, PRN, Liudmila Ramsey MD    enoxaparin (LOVENOX) injection 40 mg, 40 mg, SubCUTAneous, Q24H, Liudmila Ramsey MD, 40 mg at 05/30/21 0857    glucose chewable tablet 16 g, 4 Tablet, Oral, PRN, Liudmila Ramsey MD    dextrose (D50W) injection syrg 12.5-25 g, 12.5-25 g, IntraVENous, PRN, Liudmila Ramsey MD    glucagon [facility]) injection 1 mg, 1 mg, IntraMUSCular, PRN, Liudmila Ramsey MD    atorvastatin (LIPITOR) tablet 80 mg, 80 mg, Oral, DAILY, Liudmila Ramsey MD 80 mg at 05/31/21 0855    nicotine (NICODERM CQ) 21 mg/24 hr patch 1 Patch, 1 Patch, TransDERmal, DAILY, Liudmila Ramsey MD    polyethylene glycol (MIRALAX) packet 17 g, 17 g, Oral, DAILY PRN, Liudmila Ramsey MD    promethazine (PHENERGAN) tablet 12.5 mg, 12.5 mg, Oral, Q6H PRN **OR** ondansetron (ZOFRAN) injection 4 mg, 4 mg, IntraVENous, Q6H PRN, Liudmila Ramsey MD    melatonin tablet 3 mg, 3 mg, Oral, QHS PRN, Liudmila Ramsey MD, 3 mg at 05/23/21 1380    Review of Systems:   A comprehensive review of systems was negative except for that written in the HPI. Vital Signs:     Patient Vitals for the past 24 hrs:  Patient Vitals for the past 24 hrs:   BP Temp Pulse Resp SpO2   05/22/21 1536 106/63 98.8 °F (37.1 °C) (!) 112 13 92 %   05/22/21 1118 118/67 98.2 °F (36.8 °C) (!) 104 18 93 %   05/22/21 0745 102/60 97.8 °F (36.6 °C) 100 17 92 %         Intake/Output Summary (Last 24 hours) at 5/31/2021 1224  Last data filed at 5/31/2021 0540  Gross per 24 hour   Intake 1800 ml   Output 500 ml   Net 1300 ml        Physical Examination:     Estimated body mass index is 28.76 kg/m² as calculated from the following:    Height as of this encounter: 5' 6\" (1.676 m). Weight as of this encounter: 80.8 kg (178 lb 3.2 oz).    Estimated body mass index is 28.76 kg/m² as calculated from the following:    Height as of this encounter: 5' 6\" (1.676 m). Weight as of this encounter: 80.8 kg (178 lb 3.2 oz). General: In no acute distress. Well developed, well nourished. Head: Normocephalic, atraumatic. ENT:   Oral mucosa moist.  Neck: Supple. Heart: Regular rhythm. Tachycardic. Chest: Symmetrical expansion decreased basal breath sounds  Abdomen: Soft, nontender. No abnormal distention. Extremities:  No edema, no cyanosis. Neurological:   Alert, oriented X3. Skin: No jaundice. Data Review:       Labs:     Recent Labs     05/30/21 0351 05/29/21 0359   WBC 11.5* 13.5*   HGB 7.5* 8.1*   HCT 25.0* 26.7*   * 498*     Recent Labs     05/30/21 0351 05/29/21 0359    137   K 3.5 3.5    105   CO2 23 25   BUN 16 15   CREA 0.90 0.99   * 166*   CA 9.5 9.3     No results for input(s): ALT, AP, TBIL, TBILI, TP, ALB, GLOB, GGT, AML, LPSE in the last 72 hours. No lab exists for component: SGOT, GPT, AMYP, HLPSE  CXR 5/25  IMPRESSION  Stable small right pleural effusion and right basilar opacity. No  pneumothorax following removal of one of the chest tubes.    ______________________________________________________________________  EXPECTED LENGTH OF STAY: 4d 9h  ACTUAL LENGTH OF STAY:          Andrei Marcos MD

## 2021-05-31 NOTE — PROGRESS NOTES
Problem: Pain  Goal: *Control of Pain  Outcome: Progressing Towards Goal     Problem: Patient Education: Go to Patient Education Activity  Goal: Patient/Family Education  Outcome: Progressing Towards Goal     Problem: Risk for Spread of Infection  Goal: Prevent transmission of infectious organism to others  Description: Prevent the transmission of infectious organisms to other patients, staff members, and visitors. Outcome: Progressing Towards Goal     Problem: Patient Education:  Go to Education Activity  Goal: Patient/Family Education  Outcome: Progressing Towards Goal     Problem: Falls - Risk of  Goal: *Absence of Falls  Description: Document Alicia Polanco Fall Risk and appropriate interventions in the flowsheet. Outcome: Progressing Towards Goal  Note: Fall Risk Interventions:  Mobility Interventions: Patient to call before getting OOB         Medication Interventions: Evaluate medications/consider consulting pharmacy         History of Falls Interventions: Evaluate medications/consider consulting pharmacy         Problem: Patient Education: Go to Patient Education Activity  Goal: Patient/Family Education  Outcome: Progressing Towards Goal     Problem: Diabetes Self-Management  Goal: *Disease process and treatment process  Description: Define diabetes and identify own type of diabetes; list 3 options for treating diabetes. Outcome: Progressing Towards Goal  Goal: *Incorporating nutritional management into lifestyle  Description: Describe effect of type, amount and timing of food on blood glucose; list 3 methods for planning meals. Outcome: Progressing Towards Goal  Goal: *Incorporating physical activity into lifestyle  Description: State effect of exercise on blood glucose levels. Outcome: Progressing Towards Goal  Goal: *Developing strategies to promote health/change behavior  Description: Define the ABC's of diabetes; identify appropriate screenings, schedule and personal plan for screenings.   Outcome: Progressing Towards Goal  Goal: *Using medications safely  Description: State effect of diabetes medications on diabetes; name diabetes medication taking, action and side effects. Outcome: Progressing Towards Goal  Goal: *Monitoring blood glucose, interpreting and using results  Description: Identify recommended blood glucose targets  and personal targets. Outcome: Progressing Towards Goal  Goal: *Prevention, detection, treatment of acute complications  Description: List symptoms of hyper- and hypoglycemia; describe how to treat low blood sugar and actions for lowering  high blood glucose level. Outcome: Progressing Towards Goal  Goal: *Prevention, detection and treatment of chronic complications  Description: Define the natural course of diabetes and describe the relationship of blood glucose levels to long term complications of diabetes.   Outcome: Progressing Towards Goal  Goal: *Developing strategies to address psychosocial issues  Description: Describe feelings about living with diabetes; identify support needed and support network  Outcome: Progressing Towards Goal  Goal: *Insulin pump training  Outcome: Progressing Towards Goal  Goal: *Sick day guidelines  Outcome: Progressing Towards Goal  Goal: *Patient Specific Goal (EDIT GOAL, INSERT TEXT)  Outcome: Progressing Towards Goal     Problem: Patient Education: Go to Patient Education Activity  Goal: Patient/Family Education  Outcome: Progressing Towards Goal     Problem: Breathing Pattern - Ineffective  Goal: *Absence of hypoxia  Outcome: Progressing Towards Goal  Goal: *Use of effective breathing techniques  Outcome: Progressing Towards Goal  Goal: *PALLIATIVE CARE:  Alleviation of Dyspnea  Outcome: Progressing Towards Goal     Problem: Patient Education: Go to Patient Education Activity  Goal: Patient/Family Education  Outcome: Progressing Towards Goal

## 2021-05-31 NOTE — PROGRESS NOTES
Problem: Pain  Goal: *Control of Pain  Outcome: Progressing Towards Goal     Problem: Risk for Spread of Infection  Goal: Prevent transmission of infectious organism to others  Description: Prevent the transmission of infectious organisms to other patients, staff members, and visitors. Outcome: Progressing Towards Goal     Problem: Falls - Risk of  Goal: *Absence of Falls  Description: Document Fredy Hankins Fall Risk and appropriate interventions in the flowsheet. Outcome: Progressing Towards Goal  Note: Fall Risk Interventions:  Mobility Interventions: Communicate number of staff needed for ambulation/transfer         Medication Interventions: Evaluate medications/consider consulting pharmacy         History of Falls Interventions: Door open when patient unattended         Problem: Diabetes Self-Management  Goal: *Disease process and treatment process  Description: Define diabetes and identify own type of diabetes; list 3 options for treating diabetes. Outcome: Progressing Towards Goal  Goal: *Incorporating nutritional management into lifestyle  Description: Describe effect of type, amount and timing of food on blood glucose; list 3 methods for planning meals.   Outcome: Progressing Towards Goal     Problem: Breathing Pattern - Ineffective  Goal: *Absence of hypoxia  Outcome: Progressing Towards Goal  Goal: *Use of effective breathing techniques  Outcome: Progressing Towards Goal

## 2021-05-31 NOTE — ROUTINE PROCESS
Bedside and Verbal shift change report given to Watkins (oncoming nurse) by [place] Wong (offgoing nurse). Report included the following information SBAR, Kardex, ED Summary, Intake/Output, MAR, Recent Results and Cardiac Rhythm NSR.

## 2021-06-01 PROBLEM — J86.9 EMPYEMA OF RIGHT PLEURAL SPACE (HCC): Status: ACTIVE | Noted: 2021-06-01

## 2021-06-01 LAB
ANION GAP SERPL CALC-SCNC: 9 MMOL/L (ref 5–15)
BUN SERPL-MCNC: 15 MG/DL (ref 6–20)
BUN/CREAT SERPL: 16 (ref 12–20)
CALCIUM SERPL-MCNC: 9.3 MG/DL (ref 8.5–10.1)
CHLORIDE SERPL-SCNC: 105 MMOL/L (ref 97–108)
CO2 SERPL-SCNC: 24 MMOL/L (ref 21–32)
CREAT SERPL-MCNC: 0.94 MG/DL (ref 0.7–1.3)
ERYTHROCYTE [DISTWIDTH] IN BLOOD BY AUTOMATED COUNT: 15.7 % (ref 11.5–14.5)
GLUCOSE BLD STRIP.AUTO-MCNC: 162 MG/DL (ref 65–117)
GLUCOSE BLD STRIP.AUTO-MCNC: 181 MG/DL (ref 65–117)
GLUCOSE BLD STRIP.AUTO-MCNC: 208 MG/DL (ref 65–117)
GLUCOSE BLD STRIP.AUTO-MCNC: 213 MG/DL (ref 65–117)
GLUCOSE SERPL-MCNC: 193 MG/DL (ref 65–100)
HCT VFR BLD AUTO: 23.6 % (ref 36.6–50.3)
HGB BLD-MCNC: 7 G/DL (ref 12.1–17)
MCH RBC QN AUTO: 25.2 PG (ref 26–34)
MCHC RBC AUTO-ENTMCNC: 29.7 G/DL (ref 30–36.5)
MCV RBC AUTO: 84.9 FL (ref 80–99)
NRBC # BLD: 0 K/UL (ref 0–0.01)
NRBC BLD-RTO: 0 PER 100 WBC
PLATELET # BLD AUTO: 408 K/UL (ref 150–400)
PMV BLD AUTO: 10 FL (ref 8.9–12.9)
POTASSIUM SERPL-SCNC: 3.8 MMOL/L (ref 3.5–5.1)
RBC # BLD AUTO: 2.78 M/UL (ref 4.1–5.7)
SERVICE CMNT-IMP: ABNORMAL
SODIUM SERPL-SCNC: 138 MMOL/L (ref 136–145)
WBC # BLD AUTO: 10.8 K/UL (ref 4.1–11.1)

## 2021-06-01 PROCEDURE — 74011250636 HC RX REV CODE- 250/636: Performed by: FAMILY MEDICINE

## 2021-06-01 PROCEDURE — 76937 US GUIDE VASCULAR ACCESS: CPT

## 2021-06-01 PROCEDURE — 74011250637 HC RX REV CODE- 250/637: Performed by: THORACIC SURGERY (CARDIOTHORACIC VASCULAR SURGERY)

## 2021-06-01 PROCEDURE — 74011000258 HC RX REV CODE- 258: Performed by: INTERNAL MEDICINE

## 2021-06-01 PROCEDURE — 74011250636 HC RX REV CODE- 250/636: Performed by: THORACIC SURGERY (CARDIOTHORACIC VASCULAR SURGERY)

## 2021-06-01 PROCEDURE — C1751 CATH, INF, PER/CENT/MIDLINE: HCPCS

## 2021-06-01 PROCEDURE — 74011636637 HC RX REV CODE- 636/637: Performed by: NURSE PRACTITIONER

## 2021-06-01 PROCEDURE — 74011250636 HC RX REV CODE- 250/636: Performed by: INTERNAL MEDICINE

## 2021-06-01 PROCEDURE — 77030020365 HC SOL INJ SOD CL 0.9% 50ML

## 2021-06-01 PROCEDURE — 36415 COLL VENOUS BLD VENIPUNCTURE: CPT

## 2021-06-01 PROCEDURE — 74011000250 HC RX REV CODE- 250: Performed by: FAMILY MEDICINE

## 2021-06-01 PROCEDURE — 74011250637 HC RX REV CODE- 250/637: Performed by: HOSPITALIST

## 2021-06-01 PROCEDURE — P9047 ALBUMIN (HUMAN), 25%, 50ML: HCPCS | Performed by: INTERNAL MEDICINE

## 2021-06-01 PROCEDURE — 65270000029 HC RM PRIVATE

## 2021-06-01 PROCEDURE — C9113 INJ PANTOPRAZOLE SODIUM, VIA: HCPCS | Performed by: FAMILY MEDICINE

## 2021-06-01 PROCEDURE — 85027 COMPLETE CBC AUTOMATED: CPT

## 2021-06-01 PROCEDURE — 74011636637 HC RX REV CODE- 636/637: Performed by: THORACIC SURGERY (CARDIOTHORACIC VASCULAR SURGERY)

## 2021-06-01 PROCEDURE — 80048 BASIC METABOLIC PNL TOTAL CA: CPT

## 2021-06-01 PROCEDURE — 36573 INSJ PICC RS&I 5 YR+: CPT | Performed by: NURSE PRACTITIONER

## 2021-06-01 PROCEDURE — 82962 GLUCOSE BLOOD TEST: CPT

## 2021-06-01 RX ADMIN — CEFTRIAXONE SODIUM 2 G: 2 INJECTION, POWDER, FOR SOLUTION INTRAMUSCULAR; INTRAVENOUS at 18:04

## 2021-06-01 RX ADMIN — ALBUMIN (HUMAN) 25 G: 0.25 INJECTION, SOLUTION INTRAVENOUS at 11:13

## 2021-06-01 RX ADMIN — METOPROLOL TARTRATE 12.5 MG: 25 TABLET, FILM COATED ORAL at 21:06

## 2021-06-01 RX ADMIN — HYDROMORPHONE HYDROCHLORIDE 1 MG: 1 INJECTION, SOLUTION INTRAMUSCULAR; INTRAVENOUS; SUBCUTANEOUS at 00:42

## 2021-06-01 RX ADMIN — ALBUMIN (HUMAN) 25 G: 0.25 INJECTION, SOLUTION INTRAVENOUS at 18:04

## 2021-06-01 RX ADMIN — INSULIN LISPRO 3 UNITS: 100 INJECTION, SOLUTION INTRAVENOUS; SUBCUTANEOUS at 16:50

## 2021-06-01 RX ADMIN — INSULIN GLARGINE 25 UNITS: 100 INJECTION, SOLUTION SUBCUTANEOUS at 22:28

## 2021-06-01 RX ADMIN — ATORVASTATIN CALCIUM 80 MG: 40 TABLET, FILM COATED ORAL at 08:31

## 2021-06-01 RX ADMIN — ALBUMIN (HUMAN) 25 G: 0.25 INJECTION, SOLUTION INTRAVENOUS at 00:35

## 2021-06-01 RX ADMIN — Medication 10 ML: at 16:50

## 2021-06-01 RX ADMIN — ENOXAPARIN SODIUM 40 MG: 40 INJECTION SUBCUTANEOUS at 11:12

## 2021-06-01 RX ADMIN — INSULIN LISPRO 2 UNITS: 100 INJECTION, SOLUTION INTRAVENOUS; SUBCUTANEOUS at 06:49

## 2021-06-01 RX ADMIN — INSULIN LISPRO 2 UNITS: 100 INJECTION, SOLUTION INTRAVENOUS; SUBCUTANEOUS at 22:29

## 2021-06-01 RX ADMIN — Medication 10 ML: at 19:36

## 2021-06-01 RX ADMIN — ALBUMIN (HUMAN) 25 G: 0.25 INJECTION, SOLUTION INTRAVENOUS at 06:49

## 2021-06-01 RX ADMIN — INSULIN LISPRO 2 UNITS: 100 INJECTION, SOLUTION INTRAVENOUS; SUBCUTANEOUS at 11:58

## 2021-06-01 RX ADMIN — FERROUS SULFATE TAB 325 MG (65 MG ELEMENTAL FE) 325 MG: 325 (65 FE) TAB at 16:51

## 2021-06-01 RX ADMIN — FERROUS SULFATE TAB 325 MG (65 MG ELEMENTAL FE) 325 MG: 325 (65 FE) TAB at 08:31

## 2021-06-01 RX ADMIN — SODIUM CHLORIDE 40 MG: 9 INJECTION INTRAMUSCULAR; INTRAVENOUS; SUBCUTANEOUS at 08:32

## 2021-06-01 RX ADMIN — OXYCODONE HYDROCHLORIDE AND ACETAMINOPHEN 2 TABLET: 5; 325 TABLET ORAL at 11:13

## 2021-06-01 RX ADMIN — HYDROMORPHONE HYDROCHLORIDE 1 MG: 1 INJECTION, SOLUTION INTRAMUSCULAR; INTRAVENOUS; SUBCUTANEOUS at 07:01

## 2021-06-01 RX ADMIN — HYDROMORPHONE HYDROCHLORIDE 1 MG: 1 INJECTION, SOLUTION INTRAMUSCULAR; INTRAVENOUS; SUBCUTANEOUS at 03:32

## 2021-06-01 RX ADMIN — Medication 10 ML: at 21:00

## 2021-06-01 RX ADMIN — HYDROMORPHONE HYDROCHLORIDE 1 MG: 1 INJECTION, SOLUTION INTRAMUSCULAR; INTRAVENOUS; SUBCUTANEOUS at 19:36

## 2021-06-01 RX ADMIN — METOPROLOL TARTRATE 12.5 MG: 25 TABLET, FILM COATED ORAL at 08:31

## 2021-06-01 RX ADMIN — ALBUMIN (HUMAN) 25 G: 0.25 INJECTION, SOLUTION INTRAVENOUS at 23:12

## 2021-06-01 RX ADMIN — ASPIRIN 81 MG: 81 TABLET, CHEWABLE ORAL at 08:31

## 2021-06-01 NOTE — PROGRESS NOTES
Home IV Antibiotic Orders     1. Diagnosis:  Empyema    2. Routine PICC care per protocol    3. Antibiotic:   IV rocephin 2 gm every 24 hours    4. Lab each Monday & Thursdays             CBC/diff/platelets             BMP             CRP (every Mondays)     5. Fax lab to Dr. Sylvie Zuleta @ [phone]. 6.  Call Dr. Sylvie Zuleta @ [phone] for WBC <4, PLT <100, and/or Creat > 1.5    7. Duration of therapy: 6/21/2021       Please call Dr. Sylvie Zuleta @ [phone] before stopping therapy. 8.  Allergies: none    9.   Case management has been instructed to Call [phone] with name of [address] [place] [address] Way       Follow up with Dr. Sylvie Zuleta in 2-3 weeks     Belen Davila Si, NP

## 2021-06-01 NOTE — PROGRESS NOTES
[facility] Adult  Hospitalist Group                                                                                          Hospitalist Progress Note  Fito Almendarez MD  Answering service: [phone] from in house phone     2021 : Date of admission    Date of Service:  2021  NAME:  Nader Bernal  :  1975  MRN:  [id number]      Admission Summary:   Kevin Beauchamp a 39 y. o. male with past medical history of CAD, tobacco use disorder, non-insulin-dependent type 2 diabetes, dyslipidemia, hypertension who presented to [facility] with multiple complaints. He was admitted to [facility] on 21.  Patient endorsed a 1 month history of recurrent fevers, chills, night sweats, malaise.  He was a cigarette smoker, down from 2 packs prior to his MI last year. [facility]  had no recent travel or sick contacts. Talon Hook was not up-to-date on Covid vaccination. Patient states that he has a chronic cough which really did not change much prior to presentation. He states that what changed over the past month was that he was having right sided chest pain. This was mostly on the right lateral thorax. Patient was found to have right complex loculated pleural effusion, possible lung abscess. Thoracic surgery was consulted and  took  patient for decortication of the right lung via VATS by thoracic surgery on May 21, 2021.    21: Patient has been taken off airborne respiratory isolation by Infectious Disease today as TB has been ruled out with 2 negative acid fast bacilli smears. Interval history / Subjective:   F/u Right empyema  No new issues  Tachycardia resolved  Hb dropped to 7 today     Assessment & Plan:     Right empyema. CT chest 2021  1. Loculated right-sided hydropneumothorax with right chest wall emphysema.   Differentiating postprocedural gas from chest tube removal into the chest wall  versus extension of pleural infection into the chest is difficult  2. Diffuse groundglass and airspace opacities most confluent at the right base  compatible with pneumonia. While portions of the consolidation in the right  lower lobe are minimally improved overall appearance of the lungs has progressed  3. Right hilar and mediastinal adenopathy  -S/p Decortication of the right lung via VATS by thoracic surgery on May 21, 2021.   -One CT removed 5/25/21, final chest tube removed 5/27  -s/p PICC 5/30  -Will need Rocephin for 4 weeks per ID.      CAD. with stents x 2 to RCA in April, 2020.  -noncompliance to dual antiplatelet therapy, he developed instant stenosis, that was treated with PCI and furthermore,  had stent placed to the LAD on September 29, 2020. Cont home  meds    Hypoalbuminemia. contributing to tachycardia with reduced intravascular volume in the setting of decreased oncotic pressures. S/p IV albumin    Persistent tachycardia.  Improved with fluids  -CTA chest as above  -Echo EF 65-70%    DM ty 2   lantus + ISS monitor BG    Hypokalemia- repleted K    Chr anemia-   Low Iron /B12/folate ok, replace oral and IV Iron  -s/p 1 unit PRBC 5/26  -hb 7 today, will get FOBT, repeat CBC  -transfuse for hb<7    Code status: full  DVT proph:Lovenox    Plan: FOBT,      Care Plan discussed with: Nurse  Anticipated Disposition: Patient wants to get discharged to home with outpatient IV antibiotics at infusion center     Hospital Problems  Date Reviewed: 6/1/2021        Codes Class Noted POA    * (Principal) Empyema of right pleural space ([address] ICD-10-CM: J86.9  ICD-9-CM: 510.9  6/1/2021 Yes        Lung abscess ([address] ICD-10-CM: J85.2  ICD-9-CM: 513.0  5/18/2021 Unknown              Current Facility-Administered Medications:     0.9% sodium chloride infusion, 75 mL/hr, IntraVENous, CONTINUOUS, Natahn Crow MD, Last Rate: 75 mL/hr at 05/31/21 0855, 75 mL/hr at 05/31/21 0855    pantoprazole (PROTONIX) 40 mg in 0.9% sodium chloride 10 mL injection, 40 mg, IntraVENous, DAILY, Alisson Love MD, 40 mg at 06/01/21 8501    insulin lispro (HUMALOG) injection, , SubCUTAneous, AC&HS, Cira Rooney, NP, 2 Units at 06/01/21 1158    0.9% sodium chloride infusion 250 mL, 250 mL, IntraVENous, PRN, Lalo Hope MD    COVID-19 vac,Ad26-PF (CRYSTAL) injection 1 Dose, 1 Dose, IntraMUSCular, PRIOR TO DISCHARGE, Lalo Hope MD    cefTRIAXone (ROCEPHIN) 2 g in 0.9% sodium chloride (MBP/ADV) 50 mL MBP, 2 g, IntraVENous, Q24H, Rosa Elena Chow MD, Last Rate: 100 mL/hr at 05/31/21 1845, 2 g at 05/31/21 1845    ferrous sulfate tablet 325 mg, 1 Tablet, Oral, BID WITH MEALS, Lalo Hope MD, 325 mg at 06/01/21 0831    albumin human 25% (BUMINATE) solution 25 g, 25 g, IntraVENous, Q6H, Lm Centeno H, DO, 25 g at 06/01/21 1113    aspirin chewable tablet 81 mg, 81 mg, Oral, DAILY, Lalo Hope MD, 81 mg at 06/01/21 0831    metoprolol tartrate (LOPRESSOR) tablet 12.5 mg, 12.5 mg, Oral, BID, Lalo Hope MD, 12.5 mg at 06/01/21 0831    diphenhydrAMINE (BENADRYL) capsule 25 mg, 25 mg, Oral, Q6H PRN, Yuridia Shultz MD, 25 mg at 05/21/21 0120    insulin glargine (LANTUS) injection 25 Units, 25 Units, SubCUTAneous, QHS, Yuridia Shultz MD, 25 Units at 05/31/21 2200    sodium chloride (NS) flush 5-40 mL, 5-40 mL, IntraVENous, Q8H, Yuridia Shultz MD, 10 mL at 05/28/21 1303    sodium chloride (NS) flush 5-40 mL, 5-40 mL, IntraVENous, PRN, Yuridia Shultz MD    oxyCODONE-acetaminophen (PERCOCET) 5-325 mg per tablet 2 Tablet, 2 Tablet, Oral, Q4H PRN, Yuridia Shultz MD, 2 Tablet at 06/01/21 1113    HYDROmorphone (PF) (DILAUDID) injection 1 mg, 1 mg, IntraVENous, Q3H PRN, Yuridia Shultz MD, 1 mg at 06/01/21 0701    naloxone [facility]) injection 0.4 mg, 0.4 mg, IntraVENous, PRN, Yuridia Shultz MD    enoxaparin (LOVENOX) injection 40 mg, 40 mg, SubCUTAneous, Q24H, Yuridia Shultz MD, 40 mg at 06/01/21 1112    glucose chewable tablet 16 g, 4 Tablet, Oral, PRN, Gentry Pearl MD    dextrose (D50W) injection syrg 12.5-25 g, 12.5-25 g, IntraVENous, PRN, Gentry Pearl MD    glucagon [facility]) injection 1 mg, 1 mg, IntraMUSCular, PRN, Gentry Pearl MD    atorvastatin (LIPITOR) tablet 80 mg, 80 mg, Oral, DAILY, Gentry Pearl MD, 80 mg at 06/01/21 0831    nicotine (NICODERM CQ) 21 mg/24 hr patch 1 Patch, 1 Patch, TransDERmal, DAILY, Gentry Pearl MD    polyethylene glycol (MIRALAX) packet 17 g, 17 g, Oral, DAILY PRN, Gentry Pearl MD    promethazine (PHENERGAN) tablet 12.5 mg, 12.5 mg, Oral, Q6H PRN **OR** ondansetron (ZOFRAN) injection 4 mg, 4 mg, IntraVENous, Q6H PRN, Gentry Pearl MD    melatonin tablet 3 mg, 3 mg, Oral, QHS PRN, Gentry Pearl MD, 3 mg at 05/23/21 2926    Review of Systems:   A comprehensive review of systems was negative except for that written in the HPI. Vital Signs:     Patient Vitals for the past 24 hrs:  Patient Vitals for the past 24 hrs:   BP Temp Pulse Resp SpO2   05/22/21 1536 106/63 98.8 °F (37.1 °C) (!) 112 13 92 %   05/22/21 1118 118/67 98.2 °F (36.8 °C) (!) 104 18 93 %   05/22/21 0745 102/60 97.8 °F (36.6 °C) 100 17 92 %         Intake/Output Summary (Last 24 hours) at 6/1/2021 1326  Last data filed at 5/31/2021 2006  Gross per 24 hour   Intake 8638.75 ml   Output 500 ml   Net 8138.75 ml        Physical Examination:     Estimated body mass index is 28.76 kg/m² as calculated from the following:    Height as of this encounter: 5' 6\" (1.676 m). Weight as of this encounter: 80.8 kg (178 lb 3.2 oz). Estimated body mass index is 28.76 kg/m² as calculated from the following:    Height as of this encounter: 5' 6\" (1.676 m). Weight as of this encounter: 80.8 kg (178 lb 3.2 oz). General: In no acute distress. Well developed, well nourished. Head: Normocephalic, atraumatic. ENT:   Oral mucosa moist.  Neck: Supple. Heart: Regular rhythm. Tachycardic.    Chest: Symmetrical expansion decreased basal breath sounds  Abdomen: Soft, nontender. No abnormal distention. Extremities:  No edema, no cyanosis. Neurological:   Alert, oriented X3. Skin: No jaundice. Data Review:       Labs:     Recent Labs     06/01/21 0034 05/30/21  0351   WBC 10.8 11.5*   HGB 7.0* 7.5*   HCT 23.6* 25.0*   * 444*     Recent Labs     06/01/21 0034 05/30/21  0351    138   K 3.8 3.5    107   CO2 24 23   BUN 15 16   CREA 0.94 0.90   * 160*   CA 9.3 9.5     No results for input(s): ALT, AP, TBIL, TBILI, TP, ALB, GLOB, GGT, AML, LPSE in the last 72 hours. No lab exists for component: SGOT, GPT, AMYP, HLPSE  CXR 5/25  IMPRESSION  Stable small right pleural effusion and right basilar opacity. No  pneumothorax following removal of one of the chest tubes.    ______________________________________________________________________  EXPECTED LENGTH OF STAY: 4d 9h  ACTUAL LENGTH OF STAY:          Dav Shah MD

## 2021-06-01 NOTE — PROGRESS NOTES
As per communication with RN Md [employer] [facility] has given consent for patient to receive J&J vaccine. Fiona Trivedi RN  Addendum : Patient has reconsidered J&J vaccine administrarton and prefers to receive either Moderna or Recio Peter in the community post discharge.

## 2021-06-01 NOTE — PROGRESS NOTES
ID Progress Note  2021    Subjective:   Denies any discomfort this morning. Review of Systems:            Symptom Y/N Comments   Symptom Y/N Comments   Fever/Chills  n     Chest Pain n       Poor Appetite       Edema        Cough  n     Abdominal Pain        Sputum  n     Joint Pain        SOB/WARE  n     Pruritis/Rash        Nausea/vomit  n     Tolerating PT/OT        Diarrhea  n     Tolerating Diet        Constipation  n     Other           Could NOT obtain due to:       Objective:     Vitals:   Visit Vitals  /83   Pulse 94   Temp 99.1 °F (37.3 °C)   Resp 17   Ht 5' 6\" (1.676 m)   Wt 80.8 kg (178 lb 3.2 oz)   SpO2 91%   BMI 28.76 kg/m²        Tmax:  Temp (24hrs), Av.2 °F (37.3 °C), Min:99.1 °F (37.3 °C), Max:99.5 °F (37.5 °C)      PHYSICAL EXAM:  General:  WD, WN. Alert, cooperative, no acute distress    EENT:  EOMI. Anicteric sclerae. MMM  Resp:  Clear in apex with decreased breath sounds at bases, no wheezing or rales. No accessory muscle use  CV:  Regular  rhythm,  No edema  GI:  Soft, Non distended, Non tender. +Bowel sounds  Neurologic:  Alert and oriented X 3, normal speech,   Psych:   Good insight. Not anxious nor agitated  Skin:  No rashes.   No jaundice    Labs:   Lab Results   Component Value Date/Time    WBC 10.8 2021 12:34 AM    HGB 7.0 (L) 2021 12:34 AM    HCT 23.6 (L) 2021 12:34 AM    PLATELET 441 (H) 9882 12:34 AM    MCV 84.9 2021 12:34 AM     Lab Results   Component Value Date/Time    Sodium 138 2021 12:34 AM    Potassium 3.8 2021 12:34 AM    Chloride 105 2021 12:34 AM    CO2 24 2021 12:34 AM    Anion gap 9 2021 12:34 AM    Glucose 193 (H) 2021 12:34 AM    BUN 15 2021 12:34 AM    Creatinine 0.94 2021 12:34 AM    BUN/Creatinine ratio 16 2021 12:34 AM    GFR est AA >60 2021 12:34 AM    GFR est non-AA >60 2021 12:34 AM    Calcium 9.3 2021 12:34 AM    Bilirubin, total 0.4 2021 03:42 AM    Alk. phosphatase 109 05/23/2021 03:42 AM    Protein, total 6.5 05/23/2021 03:42 AM    Albumin 1.9 (L) 05/23/2021 03:42 AM    Globulin 4.6 (H) 05/23/2021 03:42 AM    A-G Ratio 0.4 (L) 05/23/2021 03:42 AM    ALT (SGPT) 39 05/23/2021 03:42 AM     CT of chest (5/18):  1. Multiloculated right empyema. 2. Right lower lobe pneumonia. 3. Right upper lobe pneumonia with possible small lung abscess. 4. Emphysema. 5. Coronary artery disease. CXR (5/29) Right lower lung airspace disease and pleural thickening/effusion are unchanged.   Left lung remains clear. Assessment and Plan   CAP   right lung abscess/right empyema  S/p right video assisted thoracoscopy, full pulmonary decortication (5/21) by Dr. Emanuel Case  leukocytosis  - COVID 19; rapid & PCR negative    WBC 15 (5/25), am lab ordered, afebrile    Blood cx (5/19) no growth so far    Resp cx (5/18) Beta hemolytic group A strep    Fungal blood cx (5/19) no growth so far    Pathology from Right pleural peel (5/21) Acute and chronic pleuritis with empyema     Fluid cx (5/21) STREPTOCOCCUS CONSTELLATUS     procal 1.93 (5/18)    O2 @ 2-3L via n/c    HIV (-)   rule out TB; AFB (-) x 3   quantiferon (5/18) indeterminate, aspergillus galact Ag (+)   respiratory panel (-)   legionella, strep pneumo pending   mycoplasma IgG (+) with (-) IgM; indicates previous exposure     continue with IV rocephin, recommend to complete total 4 weeks, last dose 6/21   Discharge IV ABX order in place 6/1   PICC line placement has been requested 6/1     fever work up if temp >= 100.4     Poorly controlled Type II DM  - A1C 13.2, management per primary team; continue with insulin therapy     CAD  - continue with ASA and statin    Pt is clear to discharge in ID stand point once IV ABX therapy has been arranged.     Belen Renteria NP

## 2021-06-01 NOTE — PROGRESS NOTES
.. TRANSFER - IN REPORT:    Verbal report received [facility] EST[facility]man ([facility]  [facility] (unit) for routine progression of care      Report consisted of patients Situation, Background, Assessment and   Recommendations(SBAR). Information from the following report(s) SBAR, Kardex and MAR was reviewed with the receiving nurse. Opportunity for questions and clarification was provided. Assessment completed upon patients arrival to unit and care assumed.

## 2021-06-01 NOTE — PROGRESS NOTES
Transitions of Care:  10% RUR low     Dispo- OPIC for IV ABX at [facility] in [place] - Medicaid transport for home        CM met with patient to discuss options for discharge planning for OPIC, [facility] vs NH placement. Patient declined [facility] and NH and would like to go home and can get transportation to [facility] near his home. Medicals and current [facility] orders faxed to [facility] in [place] at [phone]. Patient is requesting transportation to home upon discharge. Unit CM made aware and will follow.   PUSHPA [place]

## 2021-06-01 NOTE — PROCEDURES
PICC Placement Note    PRE-PROCEDURE VERIFICATION  Correct Procedure: yes  Correct Site:  yes  Temperature: Temp: 98.5 °F (36.9 °C), Temperature Source: Temp Source: Oral  Recent Labs     06/01/21  0034   BUN 15   CREA 0.94   *   WBC 10.8       Allergies: Patient has no known allergies. Education materials, including PICC Booklet and written information regarding central catheter related bloodstream infection and prevention given to patient. See Patient Education activity for further details. PROCEDURE DETAIL  A single lumen power injectable PICC line was started for Home IV Antibiotic Therapy. The following documentation is in addition to the PICC properties in the lines/airways flowsheet :  Lot #: [id number]  Xylocaine 1% used intradermally:  yes  Total Catheter Length: 38 (cm)  External Catheter Length: 1 (cm)  Circumference: 30.5 (cm)  Vein Selection for PICC: right basilic  Central Line Bundle followed: yes  Complication Related to Insertion: none    The placement was verified by ECG technology. The PICC is on the right side and the tip overlies the superior vena cava. ECG verification documentation is on the patient's paper chart. Line is okay to use. Report to April.     BEKA BowlesN, RN, VA-BC   Vascular Access Team

## 2021-06-01 NOTE — ROUTINE PROCESS
Bedside shift change report given to [address]man (oncoming nurse) by Bobo Rodriguez (offgoing nurse). Report included the following information SBAR, Kardex, MAR and Cardiac Rhythm NSR/ST.

## 2021-06-01 NOTE — PROGRESS NOTES
Problem: Pain  Goal: *Control of Pain  Outcome: Progressing Towards Goal     Problem: Patient Education: Go to Patient Education Activity  Goal: Patient/Family Education  Outcome: Progressing Towards Goal     Problem: Risk for Spread of Infection  Goal: Prevent transmission of infectious organism to others  Description: Prevent the transmission of infectious organisms to other patients, staff members, and visitors. Outcome: Progressing Towards Goal     Problem: Patient Education:  Go to Education Activity  Goal: Patient/Family Education  Outcome: Progressing Towards Goal     Problem: Falls - Risk of  Goal: *Absence of Falls  Description: Document [place] Fall Risk and appropriate interventions in the flowsheet. Outcome: Progressing Towards Goal  Note: Fall Risk Interventions:  Mobility Interventions: Patient to call before getting OOB         Medication Interventions: Evaluate medications/consider consulting pharmacy         History of Falls Interventions: Evaluate medications/consider consulting pharmacy         Problem: Patient Education: Go to Patient Education Activity  Goal: Patient/Family Education  Outcome: Progressing Towards Goal     Problem: Diabetes Self-Management  Goal: *Disease process and treatment process  Description: Define diabetes and identify own type of diabetes; list 3 options for treating diabetes. Outcome: Progressing Towards Goal  Goal: *Incorporating nutritional management into lifestyle  Description: Describe effect of type, amount and timing of food on blood glucose; list 3 methods for planning meals. Outcome: Progressing Towards Goal  Goal: *Incorporating physical activity into lifestyle  Description: State effect of exercise on blood glucose levels. Outcome: Progressing Towards Goal  Goal: *Developing strategies to promote health/change behavior  Description: Define the ABC's of diabetes; identify appropriate screenings, schedule and personal plan for screenings.   Outcome: Progressing Towards Goal  Goal: *Using medications safely  Description: State effect of diabetes medications on diabetes; name diabetes medication taking, action and side effects. Outcome: Progressing Towards Goal  Goal: *Monitoring blood glucose, interpreting and using results  Description: Identify recommended blood glucose targets  and personal targets. Outcome: Progressing Towards Goal  Goal: *Prevention, detection, treatment of acute complications  Description: List symptoms of hyper- and hypoglycemia; describe how to treat low blood sugar and actions for lowering  high blood glucose level. Outcome: Progressing Towards Goal  Goal: *Prevention, detection and treatment of chronic complications  Description: Define the natural course of diabetes and describe the relationship of blood glucose levels to long term complications of diabetes.   Outcome: Progressing Towards Goal  Goal: *Developing strategies to address psychosocial issues  Description: Describe feelings about living with diabetes; identify support needed and support network  Outcome: Progressing Towards Goal  Goal: *Insulin pump training  Outcome: Progressing Towards Goal  Goal: *Sick day guidelines  Outcome: Progressing Towards Goal  Goal: *Patient Specific Goal (EDIT GOAL, INSERT TEXT)  Outcome: Progressing Towards Goal     Problem: Patient Education: Go to Patient Education Activity  Goal: Patient/Family Education  Outcome: Progressing Towards Goal     Problem: Breathing Pattern - Ineffective  Goal: *Absence of hypoxia  Outcome: Progressing Towards Goal  Goal: *Use of effective breathing techniques  Outcome: Progressing Towards Goal  Goal: *PALLIATIVE CARE:  Alleviation of Dyspnea  Outcome: Progressing Towards Goal     Problem: Patient Education: Go to Patient Education Activity  Goal: Patient/Family Education  Outcome: Progressing Towards Goal

## 2021-06-01 NOTE — PROGRESS NOTES
Patient has decided to go to [phone] at [facility] in [place], [address] [facility], if possible- CM faxed the IV antibiotic orders to facility-     CM called and left voicemail for [facility] scheduling line- CM awaiting a call back from scheduling line regarding scheduling OPIC at their facility. CM also left voicemail for Damian Cordero and Stewart Posadas at [facility] requesting a call back- notified MD that [facility] still needs to be established. Do not anticipate discharge today due to above barriers- will continue to call scheduling line for [facility].     15:04 p.m.- CM received call back from Stewart Posadas with [facility] ([phone]), [facility] does not have an infusion center, however, they do have patients come to the Special Procedures Unit to get infusions- open 7 days/week, and she endorses they have ability to do IV antibiotic therapy-     Will need new orders, the orders have to read, \"IV antibiotic therapy for [facility]" and need specific PICC orders- Stewart Posadas endorses they need orders that state access through PICC and de-access from PICC, etc.- will contact HERBERT Posadas confirmed the patient does not require an authorization for the infusion- will fax updated orders to facility to begin scheduling process, will contact ID (f: [phone]). CM sent provider with ID [employer] message.      Feliz Mendoza LCSW

## 2021-06-02 LAB
ANION GAP SERPL CALC-SCNC: 6 MMOL/L (ref 5–15)
BASOPHILS # BLD: 0.1 K/UL (ref 0–0.1)
BASOPHILS NFR BLD: 1 % (ref 0–1)
BUN SERPL-MCNC: 16 MG/DL (ref 6–20)
BUN/CREAT SERPL: 17 (ref 12–20)
CALCIUM SERPL-MCNC: 9.6 MG/DL (ref 8.5–10.1)
CHLORIDE SERPL-SCNC: 106 MMOL/L (ref 97–108)
CO2 SERPL-SCNC: 26 MMOL/L (ref 21–32)
CREAT SERPL-MCNC: 0.93 MG/DL (ref 0.7–1.3)
DIFFERENTIAL METHOD BLD: ABNORMAL
EOSINOPHIL # BLD: 0.2 K/UL (ref 0–0.4)
EOSINOPHIL NFR BLD: 2 % (ref 0–7)
ERYTHROCYTE [DISTWIDTH] IN BLOOD BY AUTOMATED COUNT: 15.9 % (ref 11.5–14.5)
GLUCOSE BLD STRIP.AUTO-MCNC: 160 MG/DL (ref 65–117)
GLUCOSE BLD STRIP.AUTO-MCNC: 174 MG/DL (ref 65–117)
GLUCOSE BLD STRIP.AUTO-MCNC: 219 MG/DL (ref 65–117)
GLUCOSE BLD STRIP.AUTO-MCNC: 219 MG/DL (ref 65–117)
GLUCOSE SERPL-MCNC: 123 MG/DL (ref 65–100)
HCT VFR BLD AUTO: 23.9 % (ref 36.6–50.3)
HEMOCCULT STL QL: NEGATIVE
HGB BLD-MCNC: 7.2 G/DL (ref 12.1–17)
IMM GRANULOCYTES # BLD AUTO: 0 K/UL (ref 0–0.04)
IMM GRANULOCYTES NFR BLD AUTO: 0 % (ref 0–0.5)
LYMPHOCYTES # BLD: 1.5 K/UL (ref 0.8–3.5)
LYMPHOCYTES NFR BLD: 16 % (ref 12–49)
MCH RBC QN AUTO: 25.4 PG (ref 26–34)
MCHC RBC AUTO-ENTMCNC: 30.1 G/DL (ref 30–36.5)
MCV RBC AUTO: 84.5 FL (ref 80–99)
MONOCYTES # BLD: 0.7 K/UL (ref 0–1)
MONOCYTES NFR BLD: 7 % (ref 5–13)
NEUTS SEG # BLD: 7.2 K/UL (ref 1.8–8)
NEUTS SEG NFR BLD: 74 % (ref 32–75)
NRBC # BLD: 0 K/UL (ref 0–0.01)
NRBC BLD-RTO: 0 PER 100 WBC
PLATELET # BLD AUTO: 386 K/UL (ref 150–400)
PMV BLD AUTO: 9.9 FL (ref 8.9–12.9)
POTASSIUM SERPL-SCNC: 3.9 MMOL/L (ref 3.5–5.1)
RBC # BLD AUTO: 2.83 M/UL (ref 4.1–5.7)
SERVICE CMNT-IMP: ABNORMAL
SODIUM SERPL-SCNC: 138 MMOL/L (ref 136–145)
WBC # BLD AUTO: 9.6 K/UL (ref 4.1–11.1)

## 2021-06-02 PROCEDURE — 74011250636 HC RX REV CODE- 250/636: Performed by: INTERNAL MEDICINE

## 2021-06-02 PROCEDURE — 74011636637 HC RX REV CODE- 636/637: Performed by: THORACIC SURGERY (CARDIOTHORACIC VASCULAR SURGERY)

## 2021-06-02 PROCEDURE — 82272 OCCULT BLD FECES 1-3 TESTS: CPT

## 2021-06-02 PROCEDURE — 82962 GLUCOSE BLOOD TEST: CPT

## 2021-06-02 PROCEDURE — 74011000258 HC RX REV CODE- 258: Performed by: INTERNAL MEDICINE

## 2021-06-02 PROCEDURE — 74011636637 HC RX REV CODE- 636/637: Performed by: NURSE PRACTITIONER

## 2021-06-02 PROCEDURE — 74011250636 HC RX REV CODE- 250/636: Performed by: THORACIC SURGERY (CARDIOTHORACIC VASCULAR SURGERY)

## 2021-06-02 PROCEDURE — C9113 INJ PANTOPRAZOLE SODIUM, VIA: HCPCS | Performed by: FAMILY MEDICINE

## 2021-06-02 PROCEDURE — 85025 COMPLETE CBC W/AUTO DIFF WBC: CPT

## 2021-06-02 PROCEDURE — 74011250636 HC RX REV CODE- 250/636: Performed by: FAMILY MEDICINE

## 2021-06-02 PROCEDURE — 36415 COLL VENOUS BLD VENIPUNCTURE: CPT

## 2021-06-02 PROCEDURE — P9047 ALBUMIN (HUMAN), 25%, 50ML: HCPCS | Performed by: INTERNAL MEDICINE

## 2021-06-02 PROCEDURE — 74011250637 HC RX REV CODE- 250/637: Performed by: HOSPITALIST

## 2021-06-02 PROCEDURE — 65270000029 HC RM PRIVATE

## 2021-06-02 PROCEDURE — 74011000250 HC RX REV CODE- 250: Performed by: FAMILY MEDICINE

## 2021-06-02 PROCEDURE — 74011250637 HC RX REV CODE- 250/637: Performed by: THORACIC SURGERY (CARDIOTHORACIC VASCULAR SURGERY)

## 2021-06-02 PROCEDURE — 80048 BASIC METABOLIC PNL TOTAL CA: CPT

## 2021-06-02 RX ORDER — INSULIN GLARGINE 100 [IU]/ML
INJECTION, SOLUTION SUBCUTANEOUS
Qty: 1 VIAL | Refills: 1 | Status: SHIPPED | OUTPATIENT
Start: 2021-06-02

## 2021-06-02 RX ORDER — LANOLIN ALCOHOL/MO/W.PET/CERES
325 CREAM (GRAM) TOPICAL 2 TIMES DAILY WITH MEALS
Qty: 60 TABLET | Refills: 1 | Status: SHIPPED | OUTPATIENT
Start: 2021-06-02

## 2021-06-02 RX ORDER — IBUPROFEN 200 MG
1 TABLET ORAL DAILY
Qty: 30 PATCH | Refills: 0 | Status: SHIPPED | OUTPATIENT
Start: 2021-06-02 | End: 2021-07-02

## 2021-06-02 RX ORDER — HYDROMORPHONE HYDROCHLORIDE 2 MG/1
1 TABLET ORAL
Status: DISCONTINUED | OUTPATIENT
Start: 2021-06-02 | End: 2021-06-03 | Stop reason: HOSPADM

## 2021-06-02 RX ORDER — METOPROLOL SUCCINATE 25 MG/1
12.5 TABLET, EXTENDED RELEASE ORAL DAILY
Qty: 15 TABLET | Refills: 0 | Status: SHIPPED | OUTPATIENT
Start: 2021-06-02

## 2021-06-02 RX ADMIN — HYDROMORPHONE HYDROCHLORIDE 1 MG: 1 INJECTION, SOLUTION INTRAMUSCULAR; INTRAVENOUS; SUBCUTANEOUS at 14:23

## 2021-06-02 RX ADMIN — ALBUMIN (HUMAN) 25 G: 0.25 INJECTION, SOLUTION INTRAVENOUS at 17:02

## 2021-06-02 RX ADMIN — ALBUMIN (HUMAN) 25 G: 0.25 INJECTION, SOLUTION INTRAVENOUS at 23:12

## 2021-06-02 RX ADMIN — FERROUS SULFATE TAB 325 MG (65 MG ELEMENTAL FE) 325 MG: 325 (65 FE) TAB at 17:01

## 2021-06-02 RX ADMIN — INSULIN LISPRO 2 UNITS: 100 INJECTION, SOLUTION INTRAVENOUS; SUBCUTANEOUS at 21:46

## 2021-06-02 RX ADMIN — OXYCODONE HYDROCHLORIDE AND ACETAMINOPHEN 2 TABLET: 5; 325 TABLET ORAL at 20:57

## 2021-06-02 RX ADMIN — HYDROMORPHONE HYDROCHLORIDE 1 MG: 1 INJECTION, SOLUTION INTRAMUSCULAR; INTRAVENOUS; SUBCUTANEOUS at 11:14

## 2021-06-02 RX ADMIN — ATORVASTATIN CALCIUM 80 MG: 40 TABLET, FILM COATED ORAL at 10:33

## 2021-06-02 RX ADMIN — Medication 10 ML: at 14:27

## 2021-06-02 RX ADMIN — CEFTRIAXONE SODIUM 2 G: 2 INJECTION, POWDER, FOR SOLUTION INTRAMUSCULAR; INTRAVENOUS at 17:02

## 2021-06-02 RX ADMIN — INSULIN LISPRO 2 UNITS: 100 INJECTION, SOLUTION INTRAVENOUS; SUBCUTANEOUS at 07:17

## 2021-06-02 RX ADMIN — SODIUM CHLORIDE 40 MG: 9 INJECTION INTRAMUSCULAR; INTRAVENOUS; SUBCUTANEOUS at 10:34

## 2021-06-02 RX ADMIN — Medication 10 ML: at 11:14

## 2021-06-02 RX ADMIN — HYDROMORPHONE HYDROCHLORIDE 1 MG: 2 TABLET ORAL at 18:09

## 2021-06-02 RX ADMIN — Medication 10 ML: at 05:09

## 2021-06-02 RX ADMIN — METOPROLOL TARTRATE 12.5 MG: 25 TABLET, FILM COATED ORAL at 20:57

## 2021-06-02 RX ADMIN — Medication 10 ML: at 10:34

## 2021-06-02 RX ADMIN — METOPROLOL TARTRATE 12.5 MG: 25 TABLET, FILM COATED ORAL at 10:34

## 2021-06-02 RX ADMIN — INSULIN GLARGINE 25 UNITS: 100 INJECTION, SOLUTION SUBCUTANEOUS at 21:46

## 2021-06-02 RX ADMIN — FERROUS SULFATE TAB 325 MG (65 MG ELEMENTAL FE) 325 MG: 325 (65 FE) TAB at 07:17

## 2021-06-02 RX ADMIN — INSULIN LISPRO 2 UNITS: 100 INJECTION, SOLUTION INTRAVENOUS; SUBCUTANEOUS at 17:01

## 2021-06-02 RX ADMIN — INSULIN LISPRO 3 UNITS: 100 INJECTION, SOLUTION INTRAVENOUS; SUBCUTANEOUS at 12:32

## 2021-06-02 RX ADMIN — HYDROMORPHONE HYDROCHLORIDE 1 MG: 1 INJECTION, SOLUTION INTRAMUSCULAR; INTRAVENOUS; SUBCUTANEOUS at 05:08

## 2021-06-02 RX ADMIN — HYDROMORPHONE HYDROCHLORIDE 1 MG: 1 INJECTION, SOLUTION INTRAMUSCULAR; INTRAVENOUS; SUBCUTANEOUS at 00:12

## 2021-06-02 RX ADMIN — ALBUMIN (HUMAN) 25 G: 0.25 INJECTION, SOLUTION INTRAVENOUS at 12:35

## 2021-06-02 RX ADMIN — ASPIRIN 81 MG: 81 TABLET, CHEWABLE ORAL at 10:34

## 2021-06-02 RX ADMIN — ALBUMIN (HUMAN) 25 G: 0.25 INJECTION, SOLUTION INTRAVENOUS at 05:10

## 2021-06-02 NOTE — PROGRESS NOTES
ID Progress Note  2021    Subjective:   Denies any discomfort this morning. Review of Systems:            Symptom Y/N Comments   Symptom Y/N Comments   Fever/Chills  n     Chest Pain n       Poor Appetite       Edema        Cough  n     Abdominal Pain        Sputum  n     Joint Pain        SOB/WARE  n     Pruritis/Rash        Nausea/vomit  n     Tolerating PT/OT        Diarrhea  n     Tolerating Diet        Constipation  n     Other           Could NOT obtain due to:       Objective:     Vitals:   Visit Vitals  /81 (BP 1 Location: Left upper arm, BP Patient Position: At rest)   Pulse 98   Temp 98 °F (36.7 °C)   Resp 16   Ht 5' 6\" (1.676 m)   Wt 80.8 kg (178 lb 3.2 oz)   SpO2 92%   BMI 28.76 kg/m²        Tmax:  Temp (24hrs), Av.2 °F (36.8 °C), Min:98 °F (36.7 °C), Max:98.5 °F (36.9 °C)      PHYSICAL EXAM:  General: WD, WN. Alert, cooperative, no acute distress    EENT:  EOMI. Anicteric sclerae. MMM  Resp:  Clear in apex with decreased breath sounds at bases, no wheezing or rales. No accessory muscle use  CV:  Regular  rhythm,  No edema  GI:  Soft, Non distended, Non tender. +Bowel sounds  Neurologic:  Alert and oriented X 3, normal speech,   Psych:   Good insight. Not anxious nor agitated  Skin:  No rashes.   No jaundice    Labs:   Lab Results   Component Value Date/Time    WBC 9.6 2021 01:34 AM    HGB 7.2 (L) 2021 01:34 AM    HCT 23.9 (L) 2021 01:34 AM    PLATELET 375  01:34 AM    MCV 84.5 2021 01:34 AM     Lab Results   Component Value Date/Time    Sodium 138 2021 01:34 AM    Potassium 3.9 2021 01:34 AM    Chloride 106 2021 01:34 AM    CO2 26 2021 01:34 AM    Anion gap 6 2021 01:34 AM    Glucose 123 (H) 2021 01:34 AM    BUN 16 2021 01:34 AM    Creatinine 0.93 2021 01:34 AM    BUN/Creatinine ratio 17 2021 01:34 AM    GFR est AA >60 2021 01:34 AM    GFR est non-AA >60 2021 01:34 AM    Calcium 9.6 06/02/2021 01:34 AM    Bilirubin, total 0.4 05/23/2021 03:42 AM    Alk. phosphatase 109 05/23/2021 03:42 AM    Protein, total 6.5 05/23/2021 03:42 AM    Albumin 1.9 (L) 05/23/2021 03:42 AM    Globulin 4.6 (H) 05/23/2021 03:42 AM    A-G Ratio 0.4 (L) 05/23/2021 03:42 AM    ALT (SGPT) 39 05/23/2021 03:42 AM     CT of chest (5/18):  1. Multiloculated right empyema. 2. Right lower lobe pneumonia. 3. Right upper lobe pneumonia with possible small lung abscess. 4. Emphysema. 5. Coronary artery disease. CXR (5/29) Right lower lung airspace disease and pleural thickening/effusion are unchanged.   Left lung remains clear. Assessment and Plan   CAP   right lung abscess/right empyema  S/p right video assisted thoracoscopy, full pulmonary decortication (5/21) by Dr. Funk Needs  leukocytosis  - COVID 19; rapid & PCR negative    WBC 15 (5/25), am lab ordered, afebrile    Blood cx (5/19) no growth so far    Resp cx (5/18) Beta hemolytic group A strep    Fungal blood cx (5/19) no growth so far    Pathology from Right pleural peel (5/21) Acute and chronic pleuritis with empyema     Fluid cx (5/21) STREPTOCOCCUS CONSTELLATUS     procal 1.93 (5/18)    O2 @ 2-3L via n/c    HIV (-)   rule out TB; AFB (-) x 3   quantiferon (5/18) indeterminate, aspergillus galact Ag (+)   respiratory panel (-)   legionella, strep pneumo pending   mycoplasma IgG (+) with (-) IgM; indicates previous exposure     continue with IV rocephin, recommend to complete total 4 weeks, last dose 6/21   Discharge IV ABX order in place 6/1   PICC line placed 6/1     fever work up if temp >= 100.4     Poorly controlled Type II DM  - A1C 13.2, management per primary team; continue with insulin therapy     CAD  - continue with ASA and statin    ID team signing off. Please contact us with any questions.     Belen Taylor NP

## 2021-06-02 NOTE — PROGRESS NOTES
Problem: Falls - Risk of  Goal: *Absence of Falls  Description: Document Ilya Reddy Fall Risk and appropriate interventions in the flowsheet.   Outcome: Progressing Towards Goal  Note: Fall Risk Interventions:  Mobility Interventions: Patient to call before getting OOB         Medication Interventions: Evaluate medications/consider consulting pharmacy         History of Falls Interventions: Evaluate medications/consider consulting pharmacy         Problem: Pain  Goal: *Control of Pain  Outcome: Not Progressing Towards Goal

## 2021-06-02 NOTE — PROGRESS NOTES
Home IV Antibiotic Orders     1. Diagnosis:  Empyema    2. Routine PICC care per protocol    3. Antibiotic:   IV rocephin 2 gm every 24 hours    4. Lab each Monday & Thursdays             CBC/diff/platelets             BMP             CRP (every Mondays)     5. Fax lab to Dr. Dari Gore @ [phone]. 6.  Call Dr. Dari Gore @ [phone] for WBC <4, PLT <100, and/or Creat > 1.5    7. Duration of therapy: 6/21/2021       Please call Dr. Dari Gore @ [phone] before stopping therapy. 8.  Allergies: none    9.  IV abx for [facility]      Follow up with Dr. Dari Gore in 2-3 weeks     Rewritten for Hyunsun Claudina Dandy, NP

## 2021-06-02 NOTE — DISCHARGE SUMMARY
Discharge Summary       PATIENT ID: Abhishek Arias  MRN: [id number]   YOB: 1975    DATE OF ADMISSION: 5/18/2021  2:43 AM    DATE OF DISCHARGE: 6/2/2021   PRIMARY CARE PROVIDER: Francis Nguyen MD     ATTENDING PHYSICIAN: Dr Argelia Cruz  DISCHARGING PROVIDER: Argelia Cruz MD    To contact this individual call [phone] and ask the  to page. If unavailable ask to be transferred the Adult Hospitalist Department. CONSULTATIONS: IP CONSULT TO INFECTIOUS DISEASES  IP CONSULT TO NEPHROLOGY  IP CONSULT TO THORACIC SURGERY  IP CONSULT TO THORACIC SURGERY    PROCEDURES/SURGERIES: Procedure(s):  RIGHT VATS, DECORTICATION     ADMITTING DIAGNOSES & HOSPITAL COURSE:   Right empyema. CT chest 5/27/2021  1. Loculated right-sided hydropneumothorax with right chest wall emphysema. Differentiating postprocedural gas from chest tube removal into the chest wall  versus extension of pleural infection into the chest is difficult  2. Diffuse groundglass and airspace opacities most confluent at the right base  compatible with pneumonia. While portions of the consolidation in the right  lower lobe are minimally improved overall appearance of the lungs has progressed  3. Right hilar and mediastinal adenopathy  -S/p Decortication of the right lung via VATS by thoracic surgery on May 21, 2021.   -One CT removed 5/25/21, final chest tube removed 5/27  -s/p PICC 5/30  -Will need Rocephin for 4 weeks per ID.       CAD. with stents x 2 to RCA in April, 2020.  -noncompliance to dual antiplatelet therapy, he developed instant stenosis, that was treated with PCI and furthermore,  had stent placed to the LAD on September 29, 2020. Cont home  meds  -Outpatient follow up with Cardiology, he does not remember the name of his cardiologist     Hypoalbuminemia. contributing to tachycardia with reduced intravascular volume in the setting of decreased oncotic pressures. S/p IV albumin     Persistent tachycardia.  Improved with fluids  -CTA chest as above  -Echo EF 65-70%     DM ty 2   lantus + ISS monitor BG     Hypokalemia- repleted K as needed     Chr anemia-   Low Iron /B12/folate ok  -s/p 1 unit PRBC 5/26  -hb stable, awaiting FOBT  -transfuse for hb<7  -Outpatient work up        [address] / PLAN:      1. Right empyema  2. CAD  3. Anemia       PENDING TEST RESULTS:   At the time of discharge the following test results are still pending: none    FOLLOW UP APPOINTMENTS:    Follow-up Information     Follow up With Specialties Details Why Contact Info    PMD  In 1 week      Cardiologist  In 1 week             ADDITIONAL CARE RECOMMENDATIONS:   Follow up with PMD. Please expect a blood work when you see your PMD (CBC, BMP)  Follow up with Cardiology    From ID:  1.  Diagnosis:  Empyema     2.  Routine PICC care per protocol     3.  Antibiotic:   IV rocephin 2 gm every 24 hours     4.  Lab each Monday & Thursdays             CBC/diff/platelets             BMP             CRP (every Mondays)     5.  Fax lab to Dr. Cody Haskins @ [phone].  Armin Haskins @ [phone] for WBC <4, PLT <100, and/or Creat > 1.5     7.  Duration of therapy: 6/21/2021       Please call Dr. Cody Haskins @ [phone] before stopping therapy.     8.  Allergies: none     9.  Case management has been instructed to Call [phone] with name of [address]      Follow up with Dr. Cody Haskins in 2-3 weeks       DIET: Diabetic diet    ACTIVITY: Activity as tolerated      DISCHARGE MEDICATIONS:   See Medication Reconciliation Form      NOTIFY YOUR PHYSICIAN FOR ANY OF THE FOLLOWING:   Fever over 101 degrees for 24 hours. Chest pain, shortness of breath, fever, chills, nausea, vomiting, diarrhea, change in mentation, falling, weakness, bleeding. Severe pain or pain not relieved by medications. Or, any other signs or symptoms that you may have questions about.     DISPOSITION:   x Home With:   OT  PT  [facility]  RN       SNF/Inpatient Rehab/LTAC Independent/assisted living    Hospice    Other:       PATIENT CONDITION AT DISCHARGE:     Functional status    Poor     Deconditioned    x Independent      Cognition   x  Lucid     Forgetful     Dementia      Catheters/lines (plus indication)    Graham     PICC     PEG     None      Code status    x Full code     DNR      PHYSICAL EXAMINATION AT DISCHARGE:  Please see progress note          CHRONIC MEDICAL DIAGNOSES:  Problem List as of 6/3/2021 Date Reviewed: 6/1/2021        Codes Class Noted - Resolved    * (Principal) Empyema of right pleural space ([facility] [address] ICD-10-CM: J86.9  ICD-9-CM: 510.9  6/1/2021 - Present        Lung abscess ([facility] [address] ICD-10-CM: J85.2  ICD-9-CM: 513.0  5/18/2021 - Present              Greater than 39 minutes were spent with the patient on counseling and coordination of care    Signed:   Carmelita Zuleta MD  6/3/2021  9:18 AM   .

## 2021-06-02 NOTE — PROGRESS NOTES
CM provided SBAR to unit CM now following patient- needs IV Rocephin Q24, wants to go to [facility] in [address] is contact to schedule ([phone])- need updated orders to read  \"IV antibiotic therapy for [facility]" and need specific PICC orders- Molly endorses they need orders that state access through PICC and de-access from [facility]" along with antibiotic therapy.      INLAM MoralesW

## 2021-06-02 NOTE — PROGRESS NOTES
TRANSFER - OUT REPORT:    Verbal report given to Symone(name) on Maddie De Guzman  being transferred to [facility](unit) for routine progression of care       Report consisted of patients Situation, Background, Assessment and   Recommendations(SBAR). Information from the following report(s) SBAR, Kardex, ED Summary, Procedure Summary, Intake/Output, MAR, Recent Results, Med Rec Status and Cardiac Rhythm NSR was reviewed with the receiving nurse. Lines:   PICC Single Lumen 17/65/63 Right;Basilic (Active)   Criteria for Appropriate Use Long term IV/antibiotic administration 06/01/21 1035   Site Assessment Clean;Dry 06/01/21 1035   Phlebitis Assessment 0 06/01/21 1035   Infiltration Assessment 0 06/01/21 1035   Arm Circumference (cm) 30.5 cm 06/01/21 1035   Date of Last Dressing Change 06/01/21 06/01/21 1035   Dressing Status New;Occlusive 06/01/21 1035   External Catheter Length (cm) 1 centimeters 06/01/21 1035   Dressing Type Disk with Chlorhexadine gluconate (CHG); Transparent 06/01/21 1035   Hub Color/Line Status Pink;Flushed;Capped 06/01/21 1035   Positive Blood Return (Site #1) Yes 06/01/21 1035   Alcohol Cap Used Yes 06/01/21 1035       Peripheral IV 05/28/21 Right Hand (Active)   Site Assessment Clean, dry, & intact 05/31/21 0800   Phlebitis Assessment 0 05/31/21 0800   Infiltration Assessment 0 05/31/21 0800   Dressing Status Clean, dry, & intact 05/31/21 0800   Dressing Type Transparent;Tape 05/31/21 0800   Hub Color/Line Status Pink; Infusing 05/31/21 0855   Action Taken Open ports on tubing capped 05/31/21 0800   Alcohol Cap Used Yes 05/31/21 0804        Opportunity for questions and clarification was provided.       Patient transported with:   [facility]

## 2021-06-02 NOTE — PROGRESS NOTES
Bedside and Verbal shift change report given to Marleen Jones (oncoming nurse) by Ericka Mast (offgoing nurse). Report included the following information SBAR.     1024 Notified Dr. Yoko Bales that pt's hgb is 7.2. Lovenox to be held. [address] Asked Dr. Yoko Bales if pt's Dilaudid can be changed to PO.     1715 Pt to be discharged tomorrow per Dr. Yoko Bales. [address] to Michelle Aguilera, pharmacist about Rocephin timing for tomorrow. Rocephin may be given at 1200 prior to discharge.

## 2021-06-02 NOTE — PROGRESS NOTES
MARCOS:  RUR: 11%    Disposition:  Home with IV abx at [facility] Special Procedures Unit to get infusions-       Transport: Medicaid Transport;  Confirmation #[id number] Call Back #[phone]   Transportation provider will contact the unit floor once they arrive. CM received hand-off earlier today. CM spoke with Isrrael Gonzalez at [facility] [phone] option 3. Facility need complete home IV abx orders. Orders must also include \"IV abx for [facility]". Orders must state access via PICC Line and de-access from PICC. CM sent message to Seymour RODRIGUEZ via [employer]. Patient is scheduled for 12pm tomorrow at above facility pending updated orders. Anjum Nicole, [address]    3:01pm CM sent perfect serve message to Dr. Taylor Jaime to assist in updating home IV abx orders. 4:39p  CM faxed updated orders to Isrrael Gonzalez at [facility] at [phone].    5:02p  CM received voicemail message from Betsy Denton ([facility] co-worker) advising that she had received updates orders faxed from this CM.    5:27p  CM informed Sheree Bhatia, RN, Francois Hanna RN) that patient is not being d/c'd today and that he will be discharged tomorrow (Thurs). CM sent fax to Isrrael Gonzalez  [facility]) to  Advise of d/c cancellation. 5:37pm CM called [address] [facility] to reschedule transportation for 6/3/21 at 2pm.   Ref # [id number] will be notified when  arrives.      Edgar Kodiak, [place], [address]

## 2021-06-02 NOTE — PROGRESS NOTES
[facility] Adult  Hospitalist Group                                                                                          Hospitalist Progress Note  John Muller MD  Answering service: [phone] from in house phone     2021 : Date of admission    Date of Service:  2021  NAME:  Gerri Andrade  :  1975  MRN:  [id number]      Admission Summary:   Robbin Figueroa a 39 y. o. male with past medical history of CAD, tobacco use disorder, non-insulin-dependent type 2 diabetes, dyslipidemia, hypertension who presented to [facility] with multiple complaints. He was admitted to [facility] on 21.  Patient endorsed a 1 month history of recurrent fevers, chills, night sweats, malaise.  He was a cigarette smoker, down from 2 packs prior to his MI last year. [facility]  had no recent travel or sick contacts. Wally Steve was not up-to-date on Covid vaccination. Patient states that he has a chronic cough which really did not change much prior to presentation. He states that what changed over the past month was that he was having right sided chest pain. This was mostly on the right lateral thorax. Patient was found to have right complex loculated pleural effusion, possible lung abscess. Thoracic surgery was consulted and  took  patient for decortication of the right lung via VATS by thoracic surgery on May 21, 2021. Interval history / Subjective:   F/u Right empyema  No new issues  Tachycardia resolved  Hb dropped to 7 today     Assessment & Plan:     Right empyema. CT chest 2021  1. Loculated right-sided hydropneumothorax with right chest wall emphysema. Differentiating postprocedural gas from chest tube removal into the chest wall  versus extension of pleural infection into the chest is difficult  2. Diffuse groundglass and airspace opacities most confluent at the right base  compatible with pneumonia.  While portions of the consolidation in the right  lower lobe are minimally improved overall appearance of the lungs has progressed  3. Right hilar and mediastinal adenopathy  -S/p Decortication of the right lung via VATS by thoracic surgery on May 21, 2021.   -One CT removed 5/25/21, final chest tube removed 5/27  -s/p PICC 5/30  -Will need Rocephin for 4 weeks per ID.      CAD. with stents x 2 to RCA in April, 2020.  -noncompliance to dual antiplatelet therapy, he developed instant stenosis, that was treated with PCI and furthermore,  had stent placed to the LAD on September 29, 2020. Cont home  meds    Hypoalbuminemia. contributing to tachycardia with reduced intravascular volume in the setting of decreased oncotic pressures. S/p IV albumin    Persistent tachycardia. Improved with fluids  -CTA chest as above  -Echo EF 65-70%    DM ty 2   lantus + ISS monitor BG    Hypokalemia- repleted K    Chr anemia-   Low Iron /B12/folate ok  -s/p 1 unit PRBC 5/26  -hb stable, awaiting FOBT  -transfuse for hb<7  -Outpatient work up    Code status: full  DVT proph:Lovenox    Plan: Medically stable, awaiting outpatient IV antibiotics.  CM aware      Care Plan discussed with: Nurse  Anticipated Disposition: Patient wants to get discharged to home with outpatient IV antibiotics at infusion center     Hospital Problems  Date Reviewed: 6/1/2021        Codes Class Noted POA    * (Principal) Empyema of right pleural space ([facility] [address] ICD-10-CM: J86.9  ICD-9-CM: 510.9  6/1/2021 Yes        Lung abscess ([facility] [address] ICD-10-CM: J85.2  ICD-9-CM: 513.0  5/18/2021 Unknown              Current Facility-Administered Medications:     pantoprazole (PROTONIX) 40 mg in 0.9% sodium chloride 10 mL injection, 40 mg, IntraVENous, DAILY, Nathan Crow MD, 40 mg at 06/01/21 0832    insulin lispro (HUMALOG) injection, , SubCUTAneous, AC&HS, Anitra Favre, NP, 2 Units at 06/02/21 0717    0.9% sodium chloride infusion 250 mL, 250 mL, IntraVENous, PRN, Herminia, Lalo SMILEY MD    COVID-19 vac,Ad26-PF (CRYSTAL) injection 1 Dose, 1 Dose, IntraMUSCular, PRIOR TO DISCHARGE, Lalo Hope MD    cefTRIAXone (ROCEPHIN) 2 g in 0.9% sodium chloride (MBP/ADV) 50 mL MBP, 2 g, IntraVENous, Q24H, Jonh Garcia MD, Last Rate: 100 mL/hr at 06/01/21 1804, 2 g at 06/01/21 1804    ferrous sulfate tablet 325 mg, 1 Tablet, Oral, BID WITH MEALS, Lalo Hope MD, 325 mg at 06/02/21 0717    albumin human 25% (BUMINATE) solution 25 g, 25 g, IntraVENous, Q6H, Lm Centeno DO, 25 g at 06/02/21 0510    aspirin chewable tablet 81 mg, 81 mg, Oral, DAILY, Lalo Hope MD, 81 mg at 06/01/21 0831    metoprolol tartrate (LOPRESSOR) tablet 12.5 mg, 12.5 mg, Oral, BID, Lalo Hope MD, 12.5 mg at 06/01/21 2106    diphenhydrAMINE (BENADRYL) capsule 25 mg, 25 mg, Oral, Q6H PRN, John Kohli MD, 25 mg at 05/21/21 0120    insulin glargine (LANTUS) injection 25 Units, 25 Units, SubCUTAneous, QHS, John Kohli MD, 25 Units at 06/01/21 2228    sodium chloride (NS) flush 5-40 mL, 5-40 mL, IntraVENous, Q8H, John Kohli MD, 10 mL at 06/02/21 0509    sodium chloride (NS) flush 5-40 mL, 5-40 mL, IntraVENous, PRN, John Kohli MD, 10 mL at 06/01/21 1936    oxyCODONE-acetaminophen (PERCOCET) 5-325 mg per tablet 2 Tablet, 2 Tablet, Oral, Q4H PRN, John Kohli MD, 2 Tablet at 06/01/21 1113    HYDROmorphone (PF) (DILAUDID) injection 1 mg, 1 mg, IntraVENous, Q3H PRN, John Kohli MD, 1 mg at 06/02/21 0508    naloxone (NARCAN) injection 0.4 mg, 0.4 mg, IntraVENous, PRN, John Kohli MD    enoxaparin (LOVENOX) injection 40 mg, 40 mg, SubCUTAneous, Q24H, John Kohli MD, 40 mg at 06/01/21 1112    glucose chewable tablet 16 g, 4 Tablet, Oral, PRN, John Kohli MD    dextrose (D50W) injection syrg 12.5-25 g, 12.5-25 g, IntraVENous, PRN, John Kohli MD    glucagon [facility]) injection 1 mg, 1 mg, IntraMUSCular, PRN, John Kohli MD    atorvastatin (LIPITOR) tablet 80 mg, 80 mg, Oral, DAILY, Lori Cruz MD, 80 mg at 06/01/21 0831    nicotine (NICODERM CQ) 21 mg/24 hr patch 1 Patch, 1 Patch, TransDERmal, DAILY, Lori Cruz MD    polyethylene glycol (MIRALAX) packet 17 g, 17 g, Oral, DAILY PRN, Lori Cruz MD    promethazine (PHENERGAN) tablet 12.5 mg, 12.5 mg, Oral, Q6H PRN **OR** ondansetron (ZOFRAN) injection 4 mg, 4 mg, IntraVENous, Q6H PRN, Lori Cruz MD    melatonin tablet 3 mg, 3 mg, Oral, QHS PRN, Lori Cruz MD, 3 mg at 05/23/21 3120    Review of Systems:   A comprehensive review of systems was negative except for that written in the HPI. Vital Signs:     Patient Vitals for the past 24 hrs:  Patient Vitals for the past 24 hrs:   BP Temp Pulse Resp SpO2   05/22/21 1536 106/63 98.8 °F (37.1 °C) (!) 112 13 92 %   05/22/21 1118 118/67 98.2 °F (36.8 °C) (!) 104 18 93 %   05/22/21 0745 102/60 97.8 °F (36.6 °C) 100 17 92 %         Intake/Output Summary (Last 24 hours) at 6/2/2021 0851  Last data filed at 6/1/2021 1818  Gross per 24 hour   Intake    Output 750 ml   Net -750 ml        Physical Examination:     Estimated body mass index is 28.76 kg/m² as calculated from the following:    Height as of this encounter: 5' 6\" (1.676 m). Weight as of this encounter: 80.8 kg (178 lb 3.2 oz). Estimated body mass index is 28.76 kg/m² as calculated from the following:    Height as of this encounter: 5' 6\" (1.676 m). Weight as of this encounter: 80.8 kg (178 lb 3.2 oz). General: In no acute distress. Well developed, well nourished. Head: Normocephalic, atraumatic. ENT:   Oral mucosa moist.  Neck: Supple. Heart: Regular rhythm. Tachycardic. Chest: Symmetrical expansion decreased basal breath sounds  Abdomen: Soft, nontender. No abnormal distention. Extremities:  No edema, no cyanosis. Neurological:   Alert, oriented X3. Skin: No jaundice.            Data Review:       Labs:     Recent Labs     06/02/21  0134 06/01/21  0034   WBC 9.6 10.8   HGB 7.2* 7.0*   HCT 23.9* 23.6*    408*     Recent Labs     06/02/21  0134 06/01/21  0034    138   K 3.9 3.8    105   CO2 26 24   BUN 16 15   CREA 0.93 0.94   * 193*   CA 9.6 9.3     No results for input(s): ALT, AP, TBIL, TBILI, TP, ALB, GLOB, GGT, AML, LPSE in the last 72 hours. No lab exists for component: SGOT, GPT, AMYP, HLPSE  CXR 5/25  IMPRESSION  Stable small right pleural effusion and right basilar opacity. No  pneumothorax following removal of one of the chest tubes.    ______________________________________________________________________  EXPECTED LENGTH OF STAY: 4d 9h  ACTUAL LENGTH OF STAY:          Shannen Valiente MD

## 2021-06-02 NOTE — DISCHARGE INSTRUCTIONS
Discharge SNF/Rehab Instructions/LTAC       PATIENT ID: Sue Gutierrez  MRN: [id number]   YOB: 1975    DATE OF ADMISSION: 5/18/2021  2:43 AM    DATE OF DISCHARGE: 6/2/2021    PRIMARY CARE PROVIDER: Francis Nguyen MD       ATTENDING PHYSICIAN: Rosana Collet, MD  DISCHARGING PROVIDER: Cheyanne Joseph MD     To contact this individual call [phone] and ask the  to page. If unavailable ask to be transferred the Adult Hospitalist Department. CONSULTATIONS: IP CONSULT TO INFECTIOUS DISEASES  IP CONSULT TO NEPHROLOGY  IP CONSULT TO THORACIC SURGERY  IP CONSULT TO THORACIC SURGERY    PROCEDURES/SURGERIES: Procedure(s):  RIGHT VATS, DECORTICATION     ADMITTING DIAGNOSES & HOSPITAL COURSE:   Right empyema. CT chest 5/27/2021  1. Loculated right-sided hydropneumothorax with right chest wall emphysema. Differentiating postprocedural gas from chest tube removal into the chest wall  versus extension of pleural infection into the chest is difficult  2. Diffuse groundglass and airspace opacities most confluent at the right base  compatible with pneumonia. While portions of the consolidation in the right  lower lobe are minimally improved overall appearance of the lungs has progressed  3. Right hilar and mediastinal adenopathy  -S/p Decortication of the right lung via VATS by thoracic surgery on May 21, 2021.   -One CT removed 5/25/21, final chest tube removed 5/27  -s/p PICC 5/30  -Will need Rocephin for 4 weeks per ID.       CAD. with stents x 2 to RCA in April, 2020.  -noncompliance to dual antiplatelet therapy, he developed instant stenosis, that was treated with PCI and furthermore,  had stent placed to the LAD on September 29, 2020. Cont home  meds  -Outpatient follow up with Cardiology     Hypoalbuminemia. contributing to tachycardia with reduced intravascular volume in the setting of decreased oncotic pressures. S/p IV albumin     Persistent tachycardia.  Improved with fluids  -CTA chest as above  -Echo EF 65-70%     DM ty 2   lantus + ISS monitor BG     Hypokalemia- repleted K as needed     Chr anemia-   Low Iron /B12/folate ok  -s/p 1 unit PRBC 5/26  -hb stable, awaiting FOBT  -transfuse for hb<7  -Outpatient work up        [id number] / PLAN:      1. Right empyema  2. CAD  3. Anemia       PENDING TEST RESULTS:   At the time of discharge the following test results are still pending: none    FOLLOW UP APPOINTMENTS:    Follow-up Information     Follow up With Specialties Details Why Contact Info    PMD  In 1 week      Cardiologist  In 1 week      Shannon Simmons MD Infectious Disease In 2 weeks  [address]  [phone]             ADDITIONAL CARE RECOMMENDATIONS:   Follow up with PMD. Please expect a blood work when you see your PMD (CBC, BMP)  Follow up with Cardiology    From ID:  1.  Diagnosis:  Empyema     2.  Routine PICC care per protocol     3.  Antibiotic:   IV rocephin 2 gm every 24 hours     4.  Lab each Monday & Thursdays             CBC/diff/platelets             BMP             CRP (every Mondays)     5.  Fax lab to Dr. Dari Gore @ [phone].  Lawrnce Median Dr. Dari Gore @ [phone] for WBC <4, PLT <100, and/or Creat > 1.5     7.  Duration of therapy: 6/21/2021       Please call Dr. Dari Gore @ [phone] before stopping therapy.     8.  Allergies: none     9.  Case management has been instructed to Call [phone] with name of [address]      Follow up with Dr. Dari Gore in 2-3 weeks       DIET: Diabetic diet    ACTIVITY: Activity as tolerated      DISCHARGE MEDICATIONS:   See Medication Reconciliation Form      NOTIFY YOUR PHYSICIAN FOR ANY OF THE FOLLOWING:   Fever over 101 degrees for 24 hours. Chest pain, shortness of breath, fever, chills, nausea, vomiting, diarrhea, change in mentation, falling, weakness, bleeding. Severe pain or pain not relieved by medications.   Or, any other signs or symptoms that you may have questions about.     DISPOSITION:   x Home With:   OT  PT  HH  RN       SNF/Inpatient Rehab/LTAC    Independent/assisted living    Hospice    Other:       PATIENT CONDITION AT DISCHARGE:     Functional status    Poor     Deconditioned    x Independent      Cognition   x  Lucid     Forgetful     Dementia      Catheters/lines (plus indication)    Graham     PICC     PEG     None      Code status    x Full code     DNR      PHYSICAL EXAMINATION AT DISCHARGE:  Please see progress note      CHRONIC MEDICAL DIAGNOSES:  Problem List as of 6/2/2021 Date Reviewed: 6/1/2021        Codes Class Noted - Resolved    * (Principal) Empyema of right pleural space ([facility] ICD-10-CM: J86.9  ICD-9-CM: 510.9  6/1/2021 - Present        Lung abscess ([facility] ICD-10-CM: J85.2  ICD-9-CM: 513.0  5/18/2021 - Present                CDMP Checked:   Yes x     PROBLEM LIST Updated:  Yes x         Signed:   Fito Almendarez MD  6/2/2021  9:08 AM     Follow ups:  PMD  In 1 week     Cardiologist  In 1 week     Rosa Elena Chow MD Infectious Disease In 2 weeks  [address] [place] [address] [address] [address]   [phone]

## 2021-06-02 NOTE — PROGRESS NOTES
Problem: Pain  Goal: *Control of Pain  Outcome: Progressing Towards Goal     Problem: Falls - Risk of  Goal: *Absence of Falls  Description: Document Mesha Fall Risk and appropriate interventions in the flowsheet.   Outcome: Progressing Towards Goal  Note: Fall Risk Interventions:  Mobility Interventions: Patient to call before getting OOB         Medication Interventions: Teach patient to arise slowly         History of Falls Interventions: Evaluate medications/consider consulting pharmacy

## 2021-06-03 VITALS
WEIGHT: 178.2 LBS | OXYGEN SATURATION: 92 % | TEMPERATURE: 98.3 F | RESPIRATION RATE: 18 BRPM | HEART RATE: 100 BPM | DIASTOLIC BLOOD PRESSURE: 85 MMHG | BODY MASS INDEX: 28.64 KG/M2 | HEIGHT: 66 IN | SYSTOLIC BLOOD PRESSURE: 148 MMHG

## 2021-06-03 LAB
GLUCOSE BLD STRIP.AUTO-MCNC: 136 MG/DL (ref 65–117)
GLUCOSE BLD STRIP.AUTO-MCNC: 151 MG/DL (ref 65–117)
SERVICE CMNT-IMP: ABNORMAL
SERVICE CMNT-IMP: ABNORMAL

## 2021-06-03 PROCEDURE — 74011250636 HC RX REV CODE- 250/636: Performed by: INTERNAL MEDICINE

## 2021-06-03 PROCEDURE — 74011250637 HC RX REV CODE- 250/637: Performed by: HOSPITALIST

## 2021-06-03 PROCEDURE — 74011250637 HC RX REV CODE- 250/637: Performed by: THORACIC SURGERY (CARDIOTHORACIC VASCULAR SURGERY)

## 2021-06-03 PROCEDURE — 74011000258 HC RX REV CODE- 258: Performed by: INTERNAL MEDICINE

## 2021-06-03 PROCEDURE — P9047 ALBUMIN (HUMAN), 25%, 50ML: HCPCS | Performed by: INTERNAL MEDICINE

## 2021-06-03 PROCEDURE — 82962 GLUCOSE BLOOD TEST: CPT

## 2021-06-03 PROCEDURE — 74011250636 HC RX REV CODE- 250/636: Performed by: THORACIC SURGERY (CARDIOTHORACIC VASCULAR SURGERY)

## 2021-06-03 PROCEDURE — 74011250636 HC RX REV CODE- 250/636: Performed by: FAMILY MEDICINE

## 2021-06-03 PROCEDURE — C9113 INJ PANTOPRAZOLE SODIUM, VIA: HCPCS | Performed by: FAMILY MEDICINE

## 2021-06-03 PROCEDURE — 74011636637 HC RX REV CODE- 636/637: Performed by: NURSE PRACTITIONER

## 2021-06-03 PROCEDURE — 74011000250 HC RX REV CODE- 250: Performed by: FAMILY MEDICINE

## 2021-06-03 RX ORDER — OXYCODONE AND ACETAMINOPHEN 5; 325 MG/1; MG/1
2 TABLET ORAL
Qty: 20 TABLET | Refills: 0 | Status: SHIPPED | OUTPATIENT
Start: 2021-06-03 | End: 2021-06-06

## 2021-06-03 RX ADMIN — ALBUMIN (HUMAN) 25 G: 0.25 INJECTION, SOLUTION INTRAVENOUS at 04:46

## 2021-06-03 RX ADMIN — Medication 10 ML: at 04:47

## 2021-06-03 RX ADMIN — OXYCODONE HYDROCHLORIDE AND ACETAMINOPHEN 2 TABLET: 5; 325 TABLET ORAL at 09:01

## 2021-06-03 RX ADMIN — ENOXAPARIN SODIUM 40 MG: 40 INJECTION SUBCUTANEOUS at 09:01

## 2021-06-03 RX ADMIN — ASPIRIN 81 MG: 81 TABLET, CHEWABLE ORAL at 09:01

## 2021-06-03 RX ADMIN — INSULIN LISPRO 2 UNITS: 100 INJECTION, SOLUTION INTRAVENOUS; SUBCUTANEOUS at 06:57

## 2021-06-03 RX ADMIN — ATORVASTATIN CALCIUM 80 MG: 40 TABLET, FILM COATED ORAL at 09:01

## 2021-06-03 RX ADMIN — SODIUM CHLORIDE 40 MG: 9 INJECTION INTRAMUSCULAR; INTRAVENOUS; SUBCUTANEOUS at 09:02

## 2021-06-03 RX ADMIN — HYDROMORPHONE HYDROCHLORIDE 1 MG: 2 TABLET ORAL at 00:41

## 2021-06-03 RX ADMIN — CEFTRIAXONE SODIUM 2 G: 2 INJECTION, POWDER, FOR SOLUTION INTRAMUSCULAR; INTRAVENOUS at 11:58

## 2021-06-03 RX ADMIN — METOPROLOL TARTRATE 12.5 MG: 25 TABLET, FILM COATED ORAL at 09:01

## 2021-06-03 RX ADMIN — OXYCODONE HYDROCHLORIDE AND ACETAMINOPHEN 2 TABLET: 5; 325 TABLET ORAL at 04:34

## 2021-06-03 RX ADMIN — FERROUS SULFATE TAB 325 MG (65 MG ELEMENTAL FE) 325 MG: 325 (65 FE) TAB at 06:58

## 2021-06-03 RX ADMIN — ALBUMIN (HUMAN) 25 G: 0.25 INJECTION, SOLUTION INTRAVENOUS at 11:57

## 2021-06-03 NOTE — PROGRESS NOTES
ID Progress Note  6/3/2021    Subjective:   Denies any discomfort this morning. Review of Systems:            Symptom Y/N Comments   Symptom Y/N Comments   Fever/Chills  n     Chest Pain n       Poor Appetite       Edema        Cough  n     Abdominal Pain        Sputum  n     Joint Pain        SOB/WARE  n     Pruritis/Rash        Nausea/vomit  n     Tolerating PT/OT        Diarrhea  n     Tolerating Diet        Constipation  n     Other           Could NOT obtain due to:       Objective:     Vitals:   Visit Vitals  /82 (BP 1 Location: Left upper arm, BP Patient Position: At rest)   Pulse 90   Temp 98.2 °F (36.8 °C)   Resp 16   Ht 5' 6\" (1.676 m)   Wt 80.8 kg (178 lb 3.2 oz)   SpO2 92%   BMI 28.76 kg/m²        Tmax:  Temp (24hrs), Av.3 °F (36.8 °C), Min:98.1 °F (36.7 °C), Max:98.4 °F (36.9 °C)      PHYSICAL EXAM:  General: WD, WN. Alert, cooperative, no acute distress    EENT:  EOMI. Anicteric sclerae. MMM  Resp:  Clear breath sounds through out, no wheezing or rales. No accessory muscle use, previous removal chest tube site dressing dry and intact  CV:  Regular  rhythm,  No edema  GI:  Soft, Non distended, Non tender. +Bowel sounds  Neurologic:  Alert and oriented X 3, normal speech,   Psych:   Good insight. Not anxious nor agitated  Skin:  No rashes.   No jaundice    Labs:   Lab Results   Component Value Date/Time    WBC 9.6 2021 01:34 AM    HGB 7.2 (L) 2021 01:34 AM    HCT 23.9 (L) 2021 01:34 AM    PLATELET 153  01:34 AM    MCV 84.5 2021 01:34 AM     Lab Results   Component Value Date/Time    Sodium 138 2021 01:34 AM    Potassium 3.9 2021 01:34 AM    Chloride 106 2021 01:34 AM    CO2 26 2021 01:34 AM    Anion gap 6 2021 01:34 AM    Glucose 123 (H) 2021 01:34 AM    BUN 16 2021 01:34 AM    Creatinine 0.93 2021 01:34 AM    BUN/Creatinine ratio 17 2021 01:34 AM    GFR est AA >60 2021 01:34 AM    GFR est non-AA >60 06/02/2021 01:34 AM    Calcium 9.6 06/02/2021 01:34 AM    Bilirubin, total 0.4 05/23/2021 03:42 AM    Alk. phosphatase 109 05/23/2021 03:42 AM    Protein, total 6.5 05/23/2021 03:42 AM    Albumin 1.9 (L) 05/23/2021 03:42 AM    Globulin 4.6 (H) 05/23/2021 03:42 AM    A-G Ratio 0.4 (L) 05/23/2021 03:42 AM    ALT (SGPT) 39 05/23/2021 03:42 AM     CT of chest (5/18):  1. Multiloculated right empyema. 2. Right lower lobe pneumonia. 3. Right upper lobe pneumonia with possible small lung abscess. 4. Emphysema. 5. Coronary artery disease. CXR (5/29) Right lower lung airspace disease and pleural thickening/effusion are unchanged.   Left lung remains clear. Assessment and Plan   CAP (completed therapy)  right lung abscess/right empyema  S/p right video assisted thoracoscopy, full pulmonary decortication (5/21) by Dr. Judith Rincon 19; rapid & PCR negative    WBC 15 (5/25), am lab ordered, afebrile    Blood cx (5/19) no growth so far    Resp cx (5/18) Beta hemolytic group A strep    Fungal blood cx (5/19) no growth so far    Pathology from Right pleural peel (5/21) Acute and chronic pleuritis with empyema     Fluid cx (5/21) STREPTOCOCCUS CONSTELLATUS     procal 1.93 (5/18)    O2 @ 2-3L via n/c    HIV (-)   rule out TB; AFB (-) x 3   quantiferon (5/18) indeterminate, aspergillus galact Ag (+)   respiratory panel (-)   legionella, strep pneumo pending   mycoplasma IgG (+) with (-) IgM; indicates previous exposure     continue with IV rocephin, recommend to complete total 4 weeks, last dose 6/21   Discharge IV ABX order re-written on 6/3   PICC line placed 6/1     fever work up if temp >= 100.4     Poorly controlled Type II DM  - A1C 13.2, management per primary team; continue with insulin therapy     CAD  - continue with ASA and statin    ID team signing off. Please contact us with any questions.     Belen Young NP

## 2021-06-03 NOTE — PROGRESS NOTES
Discharge instructions done with patient at bedside. PICC line education given to patient and he verbalized understanding to go to [facility] for his Antibiotics. AVS signed, no further questions or concerns at this time.

## 2021-06-03 NOTE — PROGRESS NOTES
1.  Diagnosis:  Empyema     2.  Routine PICC care per protocol     3.  Antibiotic:   IV rocephin 2 gm every 24 hours     4.  Lab each Monday & Thursdays             CBC/diff/platelets             BMP             CRP (every Mondays)     5.  Fax lab to Dr. Dari Gore @ [phone].  Ryan Gore @ [phone] for WBC <4, PLT <100, and/or Creat > 1.5     7.  Duration of therapy: 6/21/2021       Please call Dr. Dari Gore @ [phone] before stopping therapy or with any questions.     8.  Allergies: none     9.  Pt will be receiving IV ABX at [facility]       Follow up with Dr. Dari Goer in 2-3 weeks

## 2021-06-03 NOTE — PROGRESS NOTES
MARCOS:  RUR: 11%    Disposition:  Home today. Cab transport at 2pm.    CM spoke with Dr. Viviana Fuller and confirmed patient discharge today. IV abx treatments at [facility] Friday at 12pm.  CM will call hospital today and confirm updates.     [facility], [address]

## 2021-06-03 NOTE — PROGRESS NOTES
[facility] Adult  Hospitalist Group                                                                                          Hospitalist Progress Note  Mekhi Noel MD  Answering service: [phone] from in house phone     2021 : Date of admission    Date of Service:  6/3/2021  NAME:  Nancy Johnson  :  1975  MRN:  [id number]      Admission Summary:   Rose Mary Santillan a 39 y. o. male with past medical history of CAD, tobacco use disorder, non-insulin-dependent type 2 diabetes, dyslipidemia, hypertension who presented to [facility] with multiple complaints. He was [facility] on 21.  Patient endorsed a 1 month history of recurrent fevers, chills, night sweats, malaise.  He was a cigarette smoker, down from 2 packs prior to his MI last year. Gwen   had no recent travel or sick contacts. Jeromy Dempsey was not up-to-date on Covid vaccination. Patient states that he has a chronic cough which really did not change much prior to presentation. He states that what changed over the past month was that he was having right sided chest pain. This was mostly on the right lateral thorax. Patient was found to have right complex loculated pleural effusion, possible lung abscess. Thoracic surgery was consulted and  took  patient for decortication of the right lung via VATS by thoracic surgery on May 21, 2021. Interval history / Subjective:   F/u Right empyema  Comfortably laying in bed  No chest pain, SOB, dizziness     Assessment & Plan:     Right empyema. CT chest 2021  1. Loculated right-sided hydropneumothorax with right chest wall emphysema. Differentiating postprocedural gas from chest tube removal into the chest wall  versus extension of pleural infection into the chest is difficult  2. Diffuse groundglass and airspace opacities most confluent at the right base  compatible with pneumonia.  While portions of the consolidation in the right  lower lobe are minimally improved overall appearance of the lungs has progressed  3. Right hilar and mediastinal adenopathy  -S/p Decortication of the right lung via VATS by thoracic surgery on May 21, 2021.   -One CT removed 5/25/21, final chest tube removed 5/27  -s/p PICC 5/30  -Will need Rocephin for 4 weeks per ID.      CAD. with stents x 2 to RCA in April, 2020.  -noncompliance to dual antiplatelet therapy, he developed instant stenosis, that was treated with PCI and furthermore,  had stent placed to the LAD on September 29, 2020. Cont home  meds  -Does not remember the name of his cardiologist    Hypoalbuminemia. contributing to tachycardia with reduced intravascular volume in the setting of decreased oncotic pressures. S/p IV albumin    Persistent tachycardia.  Improved with fluids  -CTA chest as above  -Echo EF 65-70%    DM ty 2   lantus + ISS monitor BG    Hypokalemia- repleted K    Chr anemia-   Low Iron /B12/folate ok  -s/p 1 unit PRBC 5/26  -hb stable, awaiting FOBT  -transfuse for hb<7  -Outpatient work up    Code status: full  DVT proph:Lovenox    Plan: Discharge today      Care Plan discussed with: Nurse  Anticipated Disposition: Patient wants to get discharged to home with outpatient IV antibiotics at infusion center     Hospital Problems  Date Reviewed: 6/1/2021        Codes Class Noted POA    * (Principal) Empyema of right pleural space ([facility] [address] ICD-10-CM: J86.9  ICD-9-CM: 510.9  6/1/2021 Yes        Lung abscess ([facility] [address] ICD-10-CM: J85.2  ICD-9-CM: 513.0  5/18/2021 Unknown              Current Facility-Administered Medications:     HYDROmorphone (DILAUDID) tablet 1 mg, 1 mg, Oral, Q4H PRN, Kelle Quinn MD, 1 mg at 06/03/21 0041    pantoprazole (PROTONIX) 40 mg in 0.9% sodium chloride 10 mL injection, 40 mg, IntraVENous, DAILY, Nathan Crow MD, 40 mg at 06/02/21 1034    insulin lispro (HUMALOG) injection, , SubCUTAneous, AC&HS, Thompson Camara NP, 2 Units at 06/03/21 0657    0.9% sodium chloride infusion 250 mL, 250 mL, IntraVENous, PRN, Lalo Hope MD    COVID-19 vac,Ad26-PF (CRYSTAL) injection 1 Dose, 1 Dose, IntraMUSCular, PRIOR TO DISCHARGE, Lalo Hope MD    cefTRIAXone (ROCEPHIN) 2 g in 0.9% sodium chloride (MBP/ADV) 50 mL MBP, 2 g, IntraVENous, Q24H, Ronen Gtz MD, Last Rate: 100 mL/hr at 06/02/21 1702, 2 g at 06/02/21 1702    ferrous sulfate tablet 325 mg, 1 Tablet, Oral, BID WITH MEALS, Lalo Hope MD, 325 mg at 06/03/21 0658    albumin human 25% (BUMINATE) solution 25 g, 25 g, IntraVENous, Q6H, Lm Centeno, DO, 25 g at 06/03/21 0446    aspirin chewable tablet 81 mg, 81 mg, Oral, DAILY, Lalo Hope MD, 81 mg at 06/02/21 1034    metoprolol tartrate (LOPRESSOR) tablet 12.5 mg, 12.5 mg, Oral, BID, Lalo Hope MD, 12.5 mg at 06/02/21 2057    diphenhydrAMINE (BENADRYL) capsule 25 mg, 25 mg, Oral, Q6H PRN, Marylee Amabile, MD, 25 mg at 05/21/21 0120    insulin glargine (LANTUS) injection 25 Units, 25 Units, SubCUTAneous, QHS, Marylee Amabile, MD, 25 Units at 06/02/21 2146    sodium chloride (NS) flush 5-40 mL, 5-40 mL, IntraVENous, Q8H, Marylee Amabile, MD, 10 mL at 06/03/21 0447    sodium chloride (NS) flush 5-40 mL, 5-40 mL, IntraVENous, PRN, Marylee Amabile, MD, 10 mL at 06/02/21 1114    oxyCODONE-acetaminophen (PERCOCET) 5-325 mg per tablet 2 Tablet, 2 Tablet, Oral, Q4H PRN, Marylee Amabile, MD, 2 Tablet at 06/03/21 0434    naloxone (NARCAN) injection 0.4 mg, 0.4 mg, IntraVENous, PRN, Marylee Amabile, MD    enoxaparin (LOVENOX) injection 40 mg, 40 mg, SubCUTAneous, Q24H, Marylee Amabile, MD, 40 mg at 06/01/21 1112    glucose chewable tablet 16 g, 4 Tablet, Oral, PRN, Marylee Amabile, MD    dextrose (D50W) injection syrg 12.5-25 g, 12.5-25 g, IntraVENous, PRN, Marylee Amabile, MD    glucagon [facility]) injection 1 mg, 1 mg, IntraMUSCular, PRN, Marylee Amabile, MD    atorvastatin (LIPITOR) tablet 80 mg, 80 mg, Oral, DAILY, Christofer Quinn MD, 80 mg at 06/02/21 1033    nicotine (NICODERM CQ) 21 mg/24 hr patch 1 Patch, 1 Patch, TransDERmal, DAILY, Christofer Quinn MD    polyethylene glycol (MIRALAX) packet 17 g, 17 g, Oral, DAILY PRN, Christofer Quinn MD    promethazine (PHENERGAN) tablet 12.5 mg, 12.5 mg, Oral, Q6H PRN **OR** ondansetron (ZOFRAN) injection 4 mg, 4 mg, IntraVENous, Q6H PRN, Christofer Quinn MD    melatonin tablet 3 mg, 3 mg, Oral, QHS PRN, Christofer Quinn MD, 3 mg at 05/23/21 4253    Review of Systems:   A comprehensive review of systems was negative except for that written in the HPI. Vital Signs:     Patient Vitals for the past 24 hrs:  Patient Vitals for the past 24 hrs:   BP Temp Pulse Resp SpO2   05/22/21 1536 106/63 98.8 °F (37.1 °C) (!) 112 13 92 %   05/22/21 1118 118/67 98.2 °F (36.8 °C) (!) 104 18 93 %   05/22/21 0745 102/60 97.8 °F (36.6 °C) 100 17 92 %         Intake/Output Summary (Last 24 hours) at 6/3/2021 0759  Last data filed at 6/2/2021 1702  Gross per 24 hour   Intake 360 ml   Output    Net 360 ml        Physical Examination:     Estimated body mass index is 28.76 kg/m² as calculated from the following:    Height as of this encounter: 5' 6\" (1.676 m). Weight as of this encounter: 80.8 kg (178 lb 3.2 oz). Estimated body mass index is 28.76 kg/m² as calculated from the following:    Height as of this encounter: 5' 6\" (1.676 m). Weight as of this encounter: 80.8 kg (178 lb 3.2 oz). General: In no acute distress. Well developed, well nourished. Head: Normocephalic, atraumatic. ENT:   Oral mucosa moist.  Neck: Supple. Heart: Regular rhythm. Tachycardic. Chest: Symmetrical expansion decreased basal breath sounds  Abdomen: Soft, nontender. No abnormal distention. Extremities:  No edema, no cyanosis. Neurological:   Alert, oriented X3. Skin: No jaundice.            Data Review:       Labs:     Recent Labs     06/02/21  0134 06/01/21  0034   WBC 9.6 10.8 HGB 7.2* 7.0*   HCT 23.9* 23.6*    408*     Recent Labs     06/02/21  0134 06/01/21  0034    138   K 3.9 3.8    105   CO2 26 24   BUN 16 15   CREA 0.93 0.94   * 193*   CA 9.6 9.3     No results for input(s): ALT, AP, TBIL, TBILI, TP, ALB, GLOB, GGT, AML, LPSE in the last 72 hours. No lab exists for component: SGOT, GPT, AMYP, HLPSE  CXR 5/25  IMPRESSION  Stable small right pleural effusion and right basilar opacity. No  pneumothorax following removal of one of the chest tubes.    ______________________________________________________________________  EXPECTED LENGTH OF STAY: 4d 9h  ACTUAL LENGTH OF STAY:          Corey Caceres MD

## 2021-06-03 NOTE — PROGRESS NOTES
Problem: Pain  Goal: *Control of Pain  Outcome: Resolved/Not Met     Problem: Patient Education: Go to Patient Education Activity  Goal: Patient/Family Education  Outcome: Resolved/Not Met     Problem: Risk for Spread of Infection  Goal: Prevent transmission of infectious organism to others  Description: Prevent the transmission of infectious organisms to other patients, staff members, and visitors. Outcome: Resolved/Not Met     Problem: Patient Education:  Go to Education Activity  Goal: Patient/Family Education  Outcome: Resolved/Not Met     Problem: Falls - Risk of  Goal: *Absence of Falls  Description: Document Mesha Fall Risk and appropriate interventions in the flowsheet. Outcome: Resolved/Not Met     Problem: Patient Education: Go to Patient Education Activity  Goal: Patient/Family Education  Outcome: Resolved/Not Met     Problem: Diabetes Self-Management  Goal: *Disease process and treatment process  Description: Define diabetes and identify own type of diabetes; list 3 options for treating diabetes. Outcome: Resolved/Not Met  Goal: *Incorporating nutritional management into lifestyle  Description: Describe effect of type, amount and timing of food on blood glucose; list 3 methods for planning meals. Outcome: Resolved/Not Met  Goal: *Incorporating physical activity into lifestyle  Description: State effect of exercise on blood glucose levels. Outcome: Resolved/Not Met  Goal: *Developing strategies to promote health/change behavior  Description: Define the ABC's of diabetes; identify appropriate screenings, schedule and personal plan for screenings. Outcome: Resolved/Not Met  Goal: *Using medications safely  Description: State effect of diabetes medications on diabetes; name diabetes medication taking, action and side effects.   Outcome: Resolved/Not Met  Goal: *Monitoring blood glucose, interpreting and using results  Description: Identify recommended blood glucose targets  and personal targets. Outcome: Resolved/Not Met  Goal: *Prevention, detection, treatment of acute complications  Description: List symptoms of hyper- and hypoglycemia; describe how to treat low blood sugar and actions for lowering  high blood glucose level. Outcome: Resolved/Not Met  Goal: *Prevention, detection and treatment of chronic complications  Description: Define the natural course of diabetes and describe the relationship of blood glucose levels to long term complications of diabetes.   Outcome: Resolved/Not Met  Goal: *Developing strategies to address psychosocial issues  Description: Describe feelings about living with diabetes; identify support needed and support network  Outcome: Resolved/Not Met  Goal: *Insulin pump training  Outcome: Resolved/Not Met  Goal: *Sick day guidelines  Outcome: Resolved/Not Met  Goal: *Patient Specific Goal (EDIT GOAL, INSERT TEXT)  Outcome: Resolved/Not Met     Problem: Patient Education: Go to Patient Education Activity  Goal: Patient/Family Education  Outcome: Resolved/Not Met     Problem: Breathing Pattern - Ineffective  Goal: *Absence of hypoxia  Outcome: Resolved/Not Met  Goal: *Use of effective breathing techniques  Outcome: Resolved/Not Met  Goal: *PALLIATIVE CARE:  Alleviation of Dyspnea  Outcome: Resolved/Not Met     Problem: Patient Education: Go to Patient Education Activity  Goal: Patient/Family Education  Outcome: Resolved/Not Met

## 2021-06-04 ENCOUNTER — PATIENT OUTREACH (OUTPATIENT)
Dept: CASE MANAGEMENT | Age: 46
End: 2021-06-04

## 2021-06-04 NOTE — PROGRESS NOTES
Patient contacted regarding COVID-19 exposure. Discussed COVID-19 related testing which was available at this time. Test results were negative. Patient informed of results, if available? n/a, pt had a negative COVID-19 test on May 18, 2021 during this hospitalization. Ambulatory Care Manager was not able to contact the patient by telephone to perform post discharge assessment.  Call within 2 business days of discharge: Yes     [facility] follow up appointment(s):   Future Appointments   Date Time Provider Fawad Henderson   6/15/2021  9:30 AM HALINA Luther AMB

## 2021-06-09 LAB
BACTERIA SPEC CULT: NORMAL
SERVICE CMNT-IMP: NORMAL

## 2021-06-10 ENCOUNTER — TELEPHONE (OUTPATIENT)
Dept: SURGERY | Age: 46
End: 2021-06-10

## 2021-06-10 NOTE — TELEPHONE ENCOUNTER
Tried to call pt to remind him of his upcoming appt with Asya Alexandre on Tuesday, 06/15 and needs a CXR prior to appt. His number on file is no longer his number. Tried to call his emergency contact and their number is no longer is service. Unable to reach pt to remind.

## 2021-06-14 DIAGNOSIS — J90 PLEURAL EFFUSION, RIGHT: Primary | ICD-10-CM

## 2021-06-21 LAB
BACTERIA SPEC CULT: NORMAL
SERVICE CMNT-IMP: NORMAL

## 2021-07-01 LAB
ACID FAST STN SPEC: NEGATIVE
MYCOBACTERIUM SPEC QL CULT: NEGATIVE
SPECIMEN PREPARATION: NORMAL
SPECIMEN SOURCE: NORMAL

## 2022-03-19 PROBLEM — J85.2 LUNG ABSCESS (HCC): Status: ACTIVE | Noted: 2021-05-18

## 2022-03-19 PROBLEM — J86.9 EMPYEMA OF RIGHT PLEURAL SPACE (HCC): Status: ACTIVE | Noted: 2021-06-01

## 2023-05-24 RX ORDER — METOPROLOL SUCCINATE 25 MG/1
12.5 TABLET, EXTENDED RELEASE ORAL DAILY
COMMUNITY
Start: 2021-06-02

## 2023-05-24 RX ORDER — CLOPIDOGREL BISULFATE 75 MG/1
75 TABLET ORAL DAILY
COMMUNITY

## 2023-05-24 RX ORDER — FERROUS SULFATE 325(65) MG
325 TABLET ORAL 2 TIMES DAILY WITH MEALS
COMMUNITY
Start: 2021-06-02

## 2023-05-24 RX ORDER — ATORVASTATIN CALCIUM 80 MG/1
80 TABLET, FILM COATED ORAL NIGHTLY
COMMUNITY

## 2023-05-24 RX ORDER — NITROGLYCERIN 0.4 MG/1
0.4 TABLET SUBLINGUAL
COMMUNITY

## 2023-05-24 RX ORDER — INSULIN GLARGINE 100 [IU]/ML
INJECTION, SOLUTION SUBCUTANEOUS
COMMUNITY
Start: 2021-06-02

## 2023-05-24 RX ORDER — ASPIRIN 81 MG/1
81 TABLET ORAL DAILY
COMMUNITY

## 2024-02-27 NOTE — PROGRESS NOTES
Bedside and Verbal shift change report given to Mayda Larsen (oncoming nurse) by Anastacia Cevallos (offgoing nurse). Report included the following information SBAR, Kardex, ED Summary, Procedure Summary, Intake/Output, MAR, Recent Results and Cardiac Rhythm NSR. General Patient Message: Katelynn is calling in regards to needing more information for prior authorization for [employer]. Turn around time is 35 hours, something will be faxed to physician as well as patient . Reference number is [id number], fax number is [phone]

## (undated) DEVICE — TOWEL SURG W17XL27IN STD BLU COT NONFENESTRATED PREWASHED

## (undated) DEVICE — SUTURE VCRL SZ 4-0 L27IN ABSRB UD L19MM PS-2 3/8 CIR PRIM J426H

## (undated) DEVICE — NDL PRT INJ NSAF BLNT 18GX1.5 --

## (undated) DEVICE — CONNECTOR TBNG WHT PLAS SUCT STR 5IN1 LTWT W/ M CONN

## (undated) DEVICE — DRAPE,REIN 53X77,STERILE: Brand: MEDLINE

## (undated) DEVICE — SYR 10ML LUER LOK 1/5ML GRAD --

## (undated) DEVICE — BLADE ELECTRODE: Brand: EDGE

## (undated) DEVICE — SOL IRR STRL H2O 1000ML BTL --

## (undated) DEVICE — SUTURE VCRL SZ 3-0 L27IN ABSRB UD L26MM SH 1/2 CIR J416H

## (undated) DEVICE — PAD BD MATTRESS 73X32 IN STD CONVOLUTED FOAM LTX FREE

## (undated) DEVICE — SUTURE SZ 0 27IN 5/8 CIR UR-6  TAPER PT VIOLET ABSRB VICRYL J603H

## (undated) DEVICE — PREP SKN CHLRAPRP APL 26ML STR --

## (undated) DEVICE — VISUALIZATION SYSTEM: Brand: CLEARIFY

## (undated) DEVICE — PAD,NON-ADHERENT,3X8,STERILE,LF,1/PK: Brand: MEDLINE

## (undated) DEVICE — GARMENT,MEDLINE,DVT,INT,CALF,MED, GEN2: Brand: MEDLINE

## (undated) DEVICE — DERMABOND SKIN ADH 0.7ML -- DERMABOND ADVANCED 12/BX

## (undated) DEVICE — SWAB CULT DBL W/O CHAR RAYON TIP AMIES GEL CLMN FOR COLL

## (undated) DEVICE — CONTAINER,SPECIMEN,3OZ,OR STRL: Brand: MEDLINE

## (undated) DEVICE — SPONGE TONSIL MED X RAY DETECTABLE STRL LTX FREE

## (undated) DEVICE — MAGNETIC INSTR DRAPE 20X16: Brand: MEDLINE INDUSTRIES, INC.

## (undated) DEVICE — NEEDLE HYPO 22GA L1.5IN BLK S STL HUB POLYPR SHLD REG BVL

## (undated) DEVICE — SPONGE,DRAIN,NONWVN,4"X4",6PLY,STRL,LF: Brand: MEDLINE

## (undated) DEVICE — SPONGE GZ W4XL4IN COT 12 PLY TYP VII WVN C FLD DSGN

## (undated) DEVICE — SURGICAL PROCEDURE KIT GEN LAPAROSCOPY LF

## (undated) DEVICE — HANDLE LT SNAP ON ULT DURABLE LENS FOR TRUMPF ALC DISPOSABLE

## (undated) DEVICE — NEEDLE SPNL 22GA L3.5IN BLK HUB S STL REG WALL FIT STYL W/

## (undated) DEVICE — STERILE POLYISOPRENE POWDER-FREE SURGICAL GLOVES WITH EMOLLIENT COATING: Brand: PROTEXIS

## (undated) DEVICE — SOL IRR SOD CL 0.9% 1000ML BTL --

## (undated) DEVICE — STRAP,POSITIONING,KNEE/BODY,FOAM,4X60": Brand: MEDLINE

## (undated) DEVICE — TUBING, SUCTION, 1/4" X 10', STRAIGHT: Brand: MEDLINE

## (undated) DEVICE — YANKAUER,BULB TIP,W/O VENT,RIGID,STERILE: Brand: MEDLINE

## (undated) DEVICE — 3M™ IOBAN™ 2 ANTIMICROBIAL INCISE DRAPE 6648EZ: Brand: IOBAN™ 2

## (undated) DEVICE — INFECTION CONTROL KIT SYS

## (undated) DEVICE — SUTURE PERMA-HAND 0 L18IN NONABSORBABLE BLK CT-1 L36MM 1/2 C021D

## (undated) DEVICE — BLADE ASSEMB CLP HAIR FINE --

## (undated) DEVICE — Device

## (undated) DEVICE — REM POLYHESIVE ADULT PATIENT RETURN ELECTRODE: Brand: VALLEYLAB

## (undated) DEVICE — CATHETER THORACENTESIS STR 28 FRX23 IN 6 EYELET TAPR TIP LF

## (undated) DEVICE — PROTECTOR E TIP CLR PLAS TB